# Patient Record
Sex: FEMALE | Race: WHITE | NOT HISPANIC OR LATINO
[De-identification: names, ages, dates, MRNs, and addresses within clinical notes are randomized per-mention and may not be internally consistent; named-entity substitution may affect disease eponyms.]

---

## 2017-01-04 ENCOUNTER — RX RENEWAL (OUTPATIENT)
Age: 62
End: 2017-01-04

## 2017-01-17 ENCOUNTER — OTHER (OUTPATIENT)
Age: 62
End: 2017-01-17

## 2017-01-17 ENCOUNTER — APPOINTMENT (OUTPATIENT)
Dept: GASTROENTEROLOGY | Facility: CLINIC | Age: 62
End: 2017-01-17

## 2017-01-31 ENCOUNTER — APPOINTMENT (OUTPATIENT)
Dept: GASTROENTEROLOGY | Facility: CLINIC | Age: 62
End: 2017-01-31

## 2017-02-08 ENCOUNTER — APPOINTMENT (OUTPATIENT)
Dept: GASTROENTEROLOGY | Facility: CLINIC | Age: 62
End: 2017-02-08

## 2017-02-08 VITALS
SYSTOLIC BLOOD PRESSURE: 118 MMHG | TEMPERATURE: 97.7 F | WEIGHT: 163 LBS | OXYGEN SATURATION: 99 % | DIASTOLIC BLOOD PRESSURE: 85 MMHG | BODY MASS INDEX: 30.78 KG/M2 | RESPIRATION RATE: 12 BRPM | HEIGHT: 61 IN | HEART RATE: 87 BPM

## 2017-02-08 DIAGNOSIS — K21.9 GASTRO-ESOPHAGEAL REFLUX DISEASE W/OUT ESOPHAGITIS: ICD-10-CM

## 2017-02-08 DIAGNOSIS — B35.9 DERMATOPHYTOSIS, UNSPECIFIED: ICD-10-CM

## 2017-02-08 DIAGNOSIS — K72.90 HEPATIC FAILURE, UNSPECIFIED W/OUT COMA: ICD-10-CM

## 2017-02-08 RX ORDER — CARVEDILOL 3.12 MG/1
3.12 TABLET, FILM COATED ORAL TWICE DAILY
Refills: 0 | Status: ACTIVE | COMMUNITY
Start: 2017-02-08

## 2017-02-08 RX ORDER — SENNOSIDES 8.6 MG/1
8.6 CAPSULE, GELATIN COATED ORAL
Refills: 0 | Status: ACTIVE | COMMUNITY
Start: 2017-02-08

## 2017-02-08 RX ORDER — DOCUSATE SODIUM 100 MG/1
100 CAPSULE ORAL 3 TIMES DAILY
Qty: 90 | Refills: 3 | Status: ACTIVE | COMMUNITY
Start: 2017-02-08

## 2017-02-08 RX ORDER — DULOXETINE HYDROCHLORIDE 30 MG/1
30 CAPSULE, DELAYED RELEASE PELLETS ORAL TWICE DAILY
Qty: 60 | Refills: 0 | Status: ACTIVE | COMMUNITY
Start: 2017-02-08

## 2017-02-08 RX ORDER — CLOPIDOGREL 75 MG/1
75 TABLET, FILM COATED ORAL DAILY
Refills: 0 | Status: ACTIVE | COMMUNITY
Start: 2017-02-08

## 2017-02-08 RX ORDER — LISINOPRIL 20 MG/1
20 TABLET ORAL DAILY
Qty: 30 | Refills: 2 | Status: ACTIVE | COMMUNITY
Start: 2017-02-08

## 2017-02-08 RX ORDER — ESOMEPRAZOLE MAGNESIUM 40 MG/1
40 CAPSULE, DELAYED RELEASE ORAL
Qty: 90 | Refills: 1 | Status: ACTIVE | COMMUNITY
Start: 2017-02-08 | End: 1900-01-01

## 2017-02-08 RX ORDER — PANTOPRAZOLE 40 MG/1
40 TABLET, DELAYED RELEASE ORAL DAILY
Qty: 30 | Refills: 0 | Status: DISCONTINUED | COMMUNITY
Start: 2017-02-08 | End: 2017-02-08

## 2017-02-08 RX ORDER — CLOTRIMAZOLE AND BETAMETHASONE DIPROPIONATE 10; .5 MG/G; MG/G
1-0.05 CREAM TOPICAL TWICE DAILY
Qty: 1 | Refills: 0 | Status: ACTIVE | COMMUNITY
Start: 2017-02-08 | End: 1900-01-01

## 2017-02-14 LAB
25(OH)D3 SERPL-MCNC: 34.6 NG/ML
AFPL3 RESULTS RECEIVED: NORMAL
ALBUMIN SERPL ELPH-MCNC: 4.3 G/DL
ALP BLD-CCNC: 141 U/L
ALPHA-1-FETOPROTEIN-L3: NORMAL %
ALPHA-1-FETOPROTEIN: 3 NG/ML
ALT SERPL-CCNC: 28 U/L
ANION GAP SERPL CALC-SCNC: 13 MMOL/L
AST SERPL-CCNC: 29 U/L
BASOPHILS # BLD AUTO: 0.04 K/UL
BASOPHILS NFR BLD AUTO: 0.8 %
BILIRUB SERPL-MCNC: 0.3 MG/DL
BUN SERPL-MCNC: 18 MG/DL
CALCIUM SERPL-MCNC: 9.9 MG/DL
CHLORIDE SERPL-SCNC: 101 MMOL/L
CO2 SERPL-SCNC: 23 MMOL/L
CREAT SERPL-MCNC: 0.81 MG/DL
CRP SERPL-MCNC: 0.2 MG/DL
EOSINOPHIL # BLD AUTO: 0.14 K/UL
EOSINOPHIL NFR BLD AUTO: 2.9 %
GLUCOSE SERPL-MCNC: 163 MG/DL
HCT VFR BLD CALC: 38.2 %
HGB BLD-MCNC: 12.2 G/DL
IMM GRANULOCYTES NFR BLD AUTO: 0.4 %
INR PPP: 1.14 RATIO
LYMPHOCYTES # BLD AUTO: 1.17 K/UL
LYMPHOCYTES NFR BLD AUTO: 24.1 %
MAN DIFF?: NORMAL
MCHC RBC-ENTMCNC: 28.4 PG
MCHC RBC-ENTMCNC: 31.9 GM/DL
MCV RBC AUTO: 89 FL
MONOCYTES # BLD AUTO: 0.38 K/UL
MONOCYTES NFR BLD AUTO: 7.8 %
NEUTROPHILS # BLD AUTO: 3.1 K/UL
NEUTROPHILS NFR BLD AUTO: 64 %
PLATELET # BLD AUTO: 116 K/UL
POTASSIUM SERPL-SCNC: 4.6 MMOL/L
PROT SERPL-MCNC: 8.3 G/DL
PT BLD: 12.9 SEC
RBC # BLD: 4.29 M/UL
RBC # FLD: 13.5 %
SODIUM SERPL-SCNC: 137 MMOL/L
WBC # FLD AUTO: 4.85 K/UL

## 2017-02-21 ENCOUNTER — OTHER (OUTPATIENT)
Age: 62
End: 2017-02-21

## 2017-02-21 RX ORDER — ESOMEPRAZOLE MAGNESIUM 40 MG/1
40 CAPSULE, DELAYED RELEASE ORAL DAILY
Qty: 90 | Refills: 1 | Status: ACTIVE | COMMUNITY
Start: 2017-02-21 | End: 1900-01-01

## 2017-02-21 RX ORDER — CLOTRIMAZOLE 10 MG/G
1 CREAM TOPICAL TWICE DAILY
Qty: 1 | Refills: 1 | Status: ACTIVE | COMMUNITY
Start: 2017-02-21 | End: 1900-01-01

## 2017-03-06 ENCOUNTER — APPOINTMENT (OUTPATIENT)
Dept: ORTHOPEDIC SURGERY | Facility: CLINIC | Age: 62
End: 2017-03-06

## 2017-03-06 VITALS
WEIGHT: 163 LBS | SYSTOLIC BLOOD PRESSURE: 143 MMHG | BODY MASS INDEX: 30.78 KG/M2 | HEIGHT: 61 IN | HEART RATE: 106 BPM | DIASTOLIC BLOOD PRESSURE: 88 MMHG

## 2017-03-06 DIAGNOSIS — Z86.39 PERSONAL HISTORY OF OTHER ENDOCRINE, NUTRITIONAL AND METABOLIC DISEASE: ICD-10-CM

## 2017-03-06 DIAGNOSIS — G56.21 LESION OF ULNAR NERVE, RIGHT UPPER LIMB: ICD-10-CM

## 2017-03-15 ENCOUNTER — OTHER (OUTPATIENT)
Age: 62
End: 2017-03-15

## 2017-03-15 RX ORDER — SPIRONOLACTONE 100 MG/1
100 TABLET ORAL
Qty: 30 | Refills: 1 | Status: ACTIVE | COMMUNITY
Start: 2017-03-15 | End: 1900-01-01

## 2017-03-21 ENCOUNTER — OUTPATIENT (OUTPATIENT)
Dept: OUTPATIENT SERVICES | Facility: HOSPITAL | Age: 62
LOS: 1 days | End: 2017-03-21
Payer: MEDICARE

## 2017-03-21 VITALS
TEMPERATURE: 98 F | SYSTOLIC BLOOD PRESSURE: 107 MMHG | RESPIRATION RATE: 18 BRPM | HEIGHT: 61 IN | WEIGHT: 175.93 LBS | HEART RATE: 102 BPM | DIASTOLIC BLOOD PRESSURE: 71 MMHG | OXYGEN SATURATION: 96 %

## 2017-03-21 DIAGNOSIS — K74.60 UNSPECIFIED CIRRHOSIS OF LIVER: ICD-10-CM

## 2017-03-21 DIAGNOSIS — Z01.818 ENCOUNTER FOR OTHER PREPROCEDURAL EXAMINATION: ICD-10-CM

## 2017-03-21 DIAGNOSIS — E11.9 TYPE 2 DIABETES MELLITUS WITHOUT COMPLICATIONS: ICD-10-CM

## 2017-03-21 DIAGNOSIS — I10 ESSENTIAL (PRIMARY) HYPERTENSION: ICD-10-CM

## 2017-03-21 LAB
ANION GAP SERPL CALC-SCNC: 18 MMOL/L — HIGH (ref 5–17)
BUN SERPL-MCNC: 26 MG/DL — HIGH (ref 7–23)
CALCIUM SERPL-MCNC: 10.5 MG/DL — SIGNIFICANT CHANGE UP (ref 8.4–10.5)
CHLORIDE SERPL-SCNC: 93 MMOL/L — LOW (ref 96–108)
CO2 SERPL-SCNC: 24 MMOL/L — SIGNIFICANT CHANGE UP (ref 22–31)
CREAT SERPL-MCNC: 1.26 MG/DL — SIGNIFICANT CHANGE UP (ref 0.5–1.3)
GLUCOSE SERPL-MCNC: 224 MG/DL — HIGH (ref 70–99)
HBA1C BLD-MCNC: 7.9 % — HIGH (ref 4–5.6)
HCT VFR BLD CALC: 38.1 % — SIGNIFICANT CHANGE UP (ref 34.5–45)
HGB BLD-MCNC: 12.5 G/DL — SIGNIFICANT CHANGE UP (ref 11.5–15.5)
MCHC RBC-ENTMCNC: 28.2 PG — SIGNIFICANT CHANGE UP (ref 27–34)
MCHC RBC-ENTMCNC: 32.8 GM/DL — SIGNIFICANT CHANGE UP (ref 32–36)
MCV RBC AUTO: 85.8 FL — SIGNIFICANT CHANGE UP (ref 80–100)
PLATELET # BLD AUTO: 148 K/UL — LOW (ref 150–400)
POTASSIUM SERPL-MCNC: 5.5 MMOL/L — HIGH (ref 3.5–5.3)
POTASSIUM SERPL-SCNC: 5.5 MMOL/L — HIGH (ref 3.5–5.3)
RBC # BLD: 4.44 M/UL — SIGNIFICANT CHANGE UP (ref 3.8–5.2)
RBC # FLD: 14.8 % — HIGH (ref 10.3–14.5)
SODIUM SERPL-SCNC: 134 MMOL/L — LOW (ref 135–145)
WBC # BLD: 6.44 K/UL — SIGNIFICANT CHANGE UP (ref 3.8–10.5)
WBC # FLD AUTO: 6.44 K/UL — SIGNIFICANT CHANGE UP (ref 3.8–10.5)

## 2017-03-21 PROCEDURE — 83036 HEMOGLOBIN GLYCOSYLATED A1C: CPT

## 2017-03-21 PROCEDURE — 80048 BASIC METABOLIC PNL TOTAL CA: CPT

## 2017-03-21 PROCEDURE — 85027 COMPLETE CBC AUTOMATED: CPT

## 2017-03-21 PROCEDURE — G0463: CPT

## 2017-03-21 NOTE — H&P PST ADULT - HISTORY OF PRESENT ILLNESS
58 year old female with h/o chronic constipation- c/o blood in the stool x one month- GI consult- for colonoscopy.      **** awaiting laminectomy 61 year old poor historian year old female with PMH of  HTN, COPD ( no recent exacerbations), depression, anxiety, Syncope 10/2016 with [facility] hospitalization x 1 month, liver cirrhosis planned for EGD under anesthesia ( screening for esophageal varices).       *** called to obtain latest Echo and last note from Dr. Baron's office: According to Dr. Gray Baron 's office, Spoke to Eliana patient is not cleared for procedure from cardiac standpoint due to Pending Stress test.  Unable to reach Dr. Gray Betancur. Called Dr. Perez, awaiting call back.  **** According to Dr. Perez note patient on Asprin and Plavix; However, patient denies taking Asprin and Plavix.      ****Patient with severe back pain on Oxycodone : awaiting laminectomy with Dr. Valenzuela in the future. 61 year old poor historian year old female with PMH of  HTN, COPD ( no recent exacerbations), depression, anxiety, Syncope 10/2016 with [facility] hospitalization, liver cirrhosis planned for EGD under anesthesia ( screening for esophageal varices).       *** called to obtain latest Echo and last note from Dr. Baron's office: According to Dr. Gray Baron 's office, Spoke to Eliana patient is not cleared for procedure from cardiac standpoint due to Pending Stress test.  Unable to reach Dr. Gray Betancur. Called Dr. Perez, awaiting call back.  **** According to Dr. Perez note patient on Asprin and Plavix; However, patient denies taking Asprin and Plavix.      ****Patient with severe back pain on Oxycodone : awaiting laminectomy with Dr. Valenzuela in the future. 61 year old poor historian year old female with PMH of  HTN, COPD ( no recent exacerbations), depression, anxiety, Syncope 10/2016 with [facility] hospitalization, liver cirrhosis planned for EGD under anesthesia ( screening for esophageal varices).       *** called to obtain latest Echo and last note from Dr. Baron's office: According to Dr. Gray Baron 's office, Spoke to Eliana patient is not cleared for procedure from cardiac standpoint due to Pending Stress test.  Unable to reach Dr. Gray Betancur. Called Dr. Perez, awaiting call back.  **** According to Dr. Perez note patient on Asprin and Plavix; However, patient denies taking Asprin and Plavix.  ****Patient with severe back pain on Oxycodone : awaiting laminectomy with Dr. Valenzuela in the future.   ******3/21/17 6pm received call back from Dr. Perez, Patient on ASA and Plavix for possible CVA 10/2016, EGD scheduled for tomorrow, Called Patient, Now she is saying she does take aspirin and Plavix for possible mini stroke.

## 2017-03-21 NOTE — H&P PST ADULT - PROBLEM SELECTOR PLAN 2
continue cardiac meds  recent echo, cardio note on file continue cardiac meds  recent ekg, echo, cardio note on file  will obtain cath report from [facility] 10/2016: called

## 2017-03-21 NOTE — H&P PST ADULT - PMH
Anemia    Anxiety    Asthma    Chronic sinusitis    Cirrhosis    Constipation, chronic    COPD (chronic obstructive pulmonary disease)  no recent exacerb  Diabetes mellitus  type 2  Fall at home    Fatty liver    GERD (gastroesophageal reflux disease)    Hyperlipidemia    Hypertension    Lung nodules Anemia    Anxiety    Anxiety and depression    Asthma    Chronic sinusitis    Cirrhosis    Constipation, chronic    COPD (chronic obstructive pulmonary disease)  no recent exacerb  Diabetes mellitus  type 2  Fall at home    Fatty liver    GERD (gastroesophageal reflux disease)    Hyperlipidemia    Hypertension    Lung nodules    Syncope and collapse  10/2016 with long hospital stay at [facility], with negative cardiac work up as per patient, s/p angiogram too with no blockages  as per patient. + right arm nerve damages.  Thrombocytopenia Anemia    Anxiety    Anxiety and depression    Asthma    Chronic sinusitis    Cirrhosis    Constipation, chronic    COPD (chronic obstructive pulmonary disease)  no recent exacerb  Diabetes mellitus  type 2  Fall at home    Fatty liver    GERD (gastroesophageal reflux disease)    Hyperlipidemia    Hypertension    Lung nodules    Syncope and collapse  10/2016 with long hospital stay at [facility], with negative cardiac work up as per patient, s/p angiogram too with no blockages  as per patient. + right arm nerve damages.  Thrombocytopenia    TIA (transient ischemic attack)  10/2016 on ASA and Plavix as per Dr. Perez

## 2017-03-21 NOTE — H&P PST ADULT - NSANTHOSAYNRD_GEN_A_CORE
No. IRENE screening performed.  STOP BANG Legend: 0-2 = LOW Risk; 3-4 = INTERMEDIATE Risk; 5-8 = HIGH Risk

## 2017-03-21 NOTE — H&P PST ADULT - LAST CARDIAC ANGIOGRAM/IMAGING
2012 ? 10/2016 at [facility] normal per pt 10/2016 at [facility] normal per pt 10/2016 at [facility]: non Obstructive CAD : report on file

## 2017-03-22 ENCOUNTER — INPATIENT (INPATIENT)
Facility: HOSPITAL | Age: 62
LOS: 1 days | Discharge: HOME | DRG: 392 | End: 2017-03-24
Attending: HOSPITALIST | Admitting: HOSPITALIST
Payer: MEDICARE

## 2017-03-22 ENCOUNTER — OTHER (OUTPATIENT)
Age: 62
End: 2017-03-22

## 2017-03-22 VITALS
TEMPERATURE: 98 F | OXYGEN SATURATION: 94 % | RESPIRATION RATE: 18 BRPM | SYSTOLIC BLOOD PRESSURE: 100 MMHG | HEART RATE: 96 BPM | DIASTOLIC BLOOD PRESSURE: 68 MMHG

## 2017-03-22 DIAGNOSIS — N17.9 ACUTE KIDNEY FAILURE, UNSPECIFIED: ICD-10-CM

## 2017-03-22 DIAGNOSIS — K75.81 NONALCOHOLIC STEATOHEPATITIS (NASH): ICD-10-CM

## 2017-03-22 DIAGNOSIS — E03.9 HYPOTHYROIDISM, UNSPECIFIED: ICD-10-CM

## 2017-03-22 DIAGNOSIS — I25.10 ATHEROSCLEROTIC HEART DISEASE OF NATIVE CORONARY ARTERY WITHOUT ANGINA PECTORIS: ICD-10-CM

## 2017-03-22 DIAGNOSIS — R06.02 SHORTNESS OF BREATH: ICD-10-CM

## 2017-03-22 DIAGNOSIS — K59.03 DRUG INDUCED CONSTIPATION: ICD-10-CM

## 2017-03-22 DIAGNOSIS — E11.9 TYPE 2 DIABETES MELLITUS WITHOUT COMPLICATIONS: ICD-10-CM

## 2017-03-22 DIAGNOSIS — R63.8 OTHER SYMPTOMS AND SIGNS CONCERNING FOOD AND FLUID INTAKE: ICD-10-CM

## 2017-03-22 DIAGNOSIS — Z41.8 ENCOUNTER FOR OTHER PROCEDURES FOR PURPOSES OTHER THAN REMEDYING HEALTH STATE: ICD-10-CM

## 2017-03-22 DIAGNOSIS — J44.9 CHRONIC OBSTRUCTIVE PULMONARY DISEASE, UNSPECIFIED: ICD-10-CM

## 2017-03-22 PROCEDURE — 99223 1ST HOSP IP/OBS HIGH 75: CPT | Mod: GC

## 2017-03-22 PROCEDURE — 93010 ELECTROCARDIOGRAM REPORT: CPT

## 2017-03-22 PROCEDURE — 71020: CPT | Mod: 26

## 2017-03-22 PROCEDURE — 74177 CT ABD & PELVIS W/CONTRAST: CPT | Mod: 26

## 2017-03-22 PROCEDURE — 99285 EMERGENCY DEPT VISIT HI MDM: CPT | Mod: 25

## 2017-03-22 RX ORDER — MECLIZINE HCL 12.5 MG
25 TABLET ORAL ONCE
Qty: 0 | Refills: 0 | Status: COMPLETED | OUTPATIENT
Start: 2017-03-22 | End: 2017-03-22

## 2017-03-22 RX ORDER — OXYCODONE HYDROCHLORIDE 5 MG/1
15 TABLET ORAL EVERY 6 HOURS
Qty: 0 | Refills: 0 | Status: DISCONTINUED | OUTPATIENT
Start: 2017-03-22 | End: 2017-03-22

## 2017-03-22 RX ORDER — ONDANSETRON 8 MG/1
4 TABLET, FILM COATED ORAL ONCE
Qty: 0 | Refills: 0 | Status: COMPLETED | OUTPATIENT
Start: 2017-03-22 | End: 2017-03-22

## 2017-03-22 RX ORDER — DEXTROSE 50 % IN WATER 50 %
1 SYRINGE (ML) INTRAVENOUS ONCE
Qty: 0 | Refills: 0 | Status: DISCONTINUED | OUTPATIENT
Start: 2017-03-22 | End: 2017-03-24

## 2017-03-22 RX ORDER — INSULIN LISPRO 100/ML
VIAL (ML) SUBCUTANEOUS AT BEDTIME
Qty: 0 | Refills: 0 | Status: DISCONTINUED | OUTPATIENT
Start: 2017-03-22 | End: 2017-03-24

## 2017-03-22 RX ORDER — IPRATROPIUM/ALBUTEROL SULFATE 18-103MCG
3 AEROSOL WITH ADAPTER (GRAM) INHALATION ONCE
Qty: 0 | Refills: 0 | Status: COMPLETED | OUTPATIENT
Start: 2017-03-22 | End: 2017-03-22

## 2017-03-22 RX ORDER — DOCUSATE SODIUM 100 MG
100 CAPSULE ORAL THREE TIMES A DAY
Qty: 0 | Refills: 0 | Status: DISCONTINUED | OUTPATIENT
Start: 2017-03-22 | End: 2017-03-24

## 2017-03-22 RX ORDER — CYCLOBENZAPRINE HYDROCHLORIDE 10 MG/1
10 TABLET, FILM COATED ORAL THREE TIMES A DAY
Qty: 0 | Refills: 0 | Status: DISCONTINUED | OUTPATIENT
Start: 2017-03-22 | End: 2017-03-24

## 2017-03-22 RX ORDER — TRAZODONE HCL 50 MG
300 TABLET ORAL AT BEDTIME
Qty: 0 | Refills: 0 | Status: DISCONTINUED | OUTPATIENT
Start: 2017-03-22 | End: 2017-03-24

## 2017-03-22 RX ORDER — LISINOPRIL 2.5 MG/1
20 TABLET ORAL DAILY
Qty: 0 | Refills: 0 | Status: DISCONTINUED | OUTPATIENT
Start: 2017-03-22 | End: 2017-03-24

## 2017-03-22 RX ORDER — DEXTROSE 50 % IN WATER 50 %
25 SYRINGE (ML) INTRAVENOUS ONCE
Qty: 0 | Refills: 0 | Status: DISCONTINUED | OUTPATIENT
Start: 2017-03-22 | End: 2017-03-24

## 2017-03-22 RX ORDER — METHYLNALTREXONE BROMIDE 12 MG/.6ML
12 INJECTION, SOLUTION SUBCUTANEOUS ONCE
Qty: 0 | Refills: 0 | Status: DISCONTINUED | OUTPATIENT
Start: 2017-03-22 | End: 2017-03-22

## 2017-03-22 RX ORDER — SODIUM CHLORIDE 9 MG/ML
3 INJECTION INTRAMUSCULAR; INTRAVENOUS; SUBCUTANEOUS ONCE
Qty: 0 | Refills: 0 | Status: COMPLETED | OUTPATIENT
Start: 2017-03-22 | End: 2017-03-22

## 2017-03-22 RX ORDER — GLUCAGON INJECTION, SOLUTION 0.5 MG/.1ML
1 INJECTION, SOLUTION SUBCUTANEOUS ONCE
Qty: 0 | Refills: 0 | Status: DISCONTINUED | OUTPATIENT
Start: 2017-03-22 | End: 2017-03-24

## 2017-03-22 RX ORDER — HEPARIN SODIUM 5000 [USP'U]/ML
5000 INJECTION INTRAVENOUS; SUBCUTANEOUS EVERY 8 HOURS
Qty: 0 | Refills: 0 | Status: DISCONTINUED | OUTPATIENT
Start: 2017-03-22 | End: 2017-03-24

## 2017-03-22 RX ORDER — GABAPENTIN 400 MG/1
400 CAPSULE ORAL ONCE
Qty: 0 | Refills: 0 | Status: COMPLETED | OUTPATIENT
Start: 2017-03-22 | End: 2017-03-22

## 2017-03-22 RX ORDER — CLONAZEPAM 1 MG
0.5 TABLET ORAL AT BEDTIME
Qty: 0 | Refills: 0 | Status: DISCONTINUED | OUTPATIENT
Start: 2017-03-22 | End: 2017-03-24

## 2017-03-22 RX ORDER — OXYCODONE HYDROCHLORIDE 5 MG/1
15 TABLET ORAL EVERY 6 HOURS
Qty: 0 | Refills: 0 | Status: DISCONTINUED | OUTPATIENT
Start: 2017-03-22 | End: 2017-03-24

## 2017-03-22 RX ORDER — INSULIN LISPRO 100/ML
VIAL (ML) SUBCUTANEOUS
Qty: 0 | Refills: 0 | Status: DISCONTINUED | OUTPATIENT
Start: 2017-03-22 | End: 2017-03-24

## 2017-03-22 RX ORDER — MORPHINE SULFATE 50 MG/1
6 CAPSULE, EXTENDED RELEASE ORAL ONCE
Qty: 0 | Refills: 0 | Status: DISCONTINUED | OUTPATIENT
Start: 2017-03-22 | End: 2017-03-22

## 2017-03-22 RX ORDER — DEXTROSE 50 % IN WATER 50 %
12.5 SYRINGE (ML) INTRAVENOUS ONCE
Qty: 0 | Refills: 0 | Status: DISCONTINUED | OUTPATIENT
Start: 2017-03-22 | End: 2017-03-24

## 2017-03-22 RX ORDER — LEVOTHYROXINE SODIUM 125 MCG
25 TABLET ORAL DAILY
Qty: 0 | Refills: 0 | Status: DISCONTINUED | OUTPATIENT
Start: 2017-03-22 | End: 2017-03-24

## 2017-03-22 RX ORDER — SENNA PLUS 8.6 MG/1
2 TABLET ORAL AT BEDTIME
Qty: 0 | Refills: 0 | Status: DISCONTINUED | OUTPATIENT
Start: 2017-03-22 | End: 2017-03-23

## 2017-03-22 RX ORDER — POLYETHYLENE GLYCOL 3350 17 G/17G
17 POWDER, FOR SOLUTION ORAL
Qty: 0 | Refills: 0 | Status: DISCONTINUED | OUTPATIENT
Start: 2017-03-22 | End: 2017-03-24

## 2017-03-22 RX ORDER — SUCRALFATE 1 G
1 TABLET ORAL
Qty: 0 | Refills: 0 | Status: DISCONTINUED | OUTPATIENT
Start: 2017-03-22 | End: 2017-03-24

## 2017-03-22 RX ORDER — SODIUM CHLORIDE 9 MG/ML
1000 INJECTION, SOLUTION INTRAVENOUS
Qty: 0 | Refills: 0 | Status: DISCONTINUED | OUTPATIENT
Start: 2017-03-22 | End: 2017-03-24

## 2017-03-22 RX ORDER — SODIUM CHLORIDE 9 MG/ML
500 INJECTION INTRAMUSCULAR; INTRAVENOUS; SUBCUTANEOUS ONCE
Qty: 0 | Refills: 0 | Status: COMPLETED | OUTPATIENT
Start: 2017-03-22 | End: 2017-03-22

## 2017-03-22 RX ORDER — CARVEDILOL PHOSPHATE 80 MG/1
3.12 CAPSULE, EXTENDED RELEASE ORAL EVERY 12 HOURS
Qty: 0 | Refills: 0 | Status: DISCONTINUED | OUTPATIENT
Start: 2017-03-22 | End: 2017-03-24

## 2017-03-22 RX ORDER — DULOXETINE HYDROCHLORIDE 30 MG/1
60 CAPSULE, DELAYED RELEASE ORAL DAILY
Qty: 0 | Refills: 0 | Status: DISCONTINUED | OUTPATIENT
Start: 2017-03-22 | End: 2017-03-24

## 2017-03-22 RX ORDER — GABAPENTIN 400 MG/1
300 CAPSULE ORAL THREE TIMES A DAY
Qty: 0 | Refills: 0 | Status: DISCONTINUED | OUTPATIENT
Start: 2017-03-22 | End: 2017-03-24

## 2017-03-22 RX ADMIN — HEPARIN SODIUM 5000 UNIT(S): 5000 INJECTION INTRAVENOUS; SUBCUTANEOUS at 23:43

## 2017-03-22 RX ADMIN — Medication 1 GRAM(S): at 23:52

## 2017-03-22 RX ADMIN — MORPHINE SULFATE 6 MILLIGRAM(S): 50 CAPSULE, EXTENDED RELEASE ORAL at 20:36

## 2017-03-22 RX ADMIN — GABAPENTIN 300 MILLIGRAM(S): 400 CAPSULE ORAL at 23:52

## 2017-03-22 RX ADMIN — SODIUM CHLORIDE 500 MILLILITER(S): 9 INJECTION INTRAMUSCULAR; INTRAVENOUS; SUBCUTANEOUS at 17:31

## 2017-03-22 RX ADMIN — Medication 3 MILLILITER(S): at 17:31

## 2017-03-22 RX ADMIN — ONDANSETRON 4 MILLIGRAM(S): 8 TABLET, FILM COATED ORAL at 17:30

## 2017-03-22 RX ADMIN — Medication 25 MILLIGRAM(S): at 17:30

## 2017-03-22 RX ADMIN — Medication 300 MILLIGRAM(S): at 23:42

## 2017-03-22 RX ADMIN — Medication 100 MILLIGRAM(S): at 23:43

## 2017-03-22 RX ADMIN — SODIUM CHLORIDE 3 MILLILITER(S): 9 INJECTION INTRAMUSCULAR; INTRAVENOUS; SUBCUTANEOUS at 17:31

## 2017-03-22 RX ADMIN — Medication 0.5 MILLIGRAM(S): at 23:43

## 2017-03-22 RX ADMIN — GABAPENTIN 400 MILLIGRAM(S): 400 CAPSULE ORAL at 17:29

## 2017-03-22 RX ADMIN — SENNA PLUS 2 TABLET(S): 8.6 TABLET ORAL at 23:42

## 2017-03-22 RX ADMIN — Medication: at 23:44

## 2017-03-22 RX ADMIN — Medication 60 MILLIGRAM(S): at 17:31

## 2017-03-22 RX ADMIN — OXYCODONE HYDROCHLORIDE 15 MILLIGRAM(S): 5 TABLET ORAL at 04:45

## 2017-03-22 NOTE — H&P ADULT. - PROBLEM SELECTOR PLAN 6
- Continue Coreg, lisinopril - Continue Coreg, lisinopril  - pt states she had a cath/angiogram + echo in Feb '17, will need to call Dr. eli Baron for outpt records and for cardiac clearance.

## 2017-03-22 NOTE — ED PROVIDER NOTE - ATTENDING CONTRIBUTION TO CARE
Attending MD Carranza: I personally have seen and examined this patient.  Resident note reviewed and agree on plan of care and except where noted.  See below for details.     61F with PMH DM HTN COPD chronic back pain, TIA, liver cirrhosis sent from endoscopy suite because she was unable to be cleared for routine endoscopy.  Patient reports 20-30 lb weight gain in the last month.  Reports did not obtain cardiac clearance, stress, Echo.  Reports chronically short of breath.  Denies any new physical complaints, baseline back pain, and only came in because she was going to have an endoscopy.  Denies chest pain, shortness of breath, palpitations. Denies abdominal pain, nausea, vomiting, diarrhea, blood in stools. Denies dysuria, hematuria, change in urinary habits including frequency, urgency. On exam, head NCAT, PERRL, FROM at neck, no tenderness to palpation or stepoffs along length of spine, +tenderness to palpation paraspinal bilateral lower back, lungs +scatterd end expiratory wheezing with good inspiratory effort, +S1S2, no m/r/g, abdomen soft with +BS, NT, +distended, no CVAT, moving all extremities; Plan: CXR, EKG, nebs, steroid, labs, CT A/P for reported marked increase in weight and girth, admit

## 2017-03-22 NOTE — H&P ADULT. - PROBLEM SELECTOR PLAN 1
- Pt with 20lb weight gain over past 2 months with evidence on CT of large stool burden likely 2/2 chronic opioid use  - Will give 1x dose of methylnaltrexone  - Will give miralax BID  - Will give enema  - Monitor stools count - Pt with 20lb weight gain over past 2 months with evidence on CT of large stool burden likely 2/2 chronic opioid use  - Will give miralax BID/stool softeners  - Will give suppository  - Monitor stools count

## 2017-03-22 NOTE — H&P ADULT. - PROBLEM SELECTOR PLAN 2
- Pt has history of COPD/Asthma with some sputum production today and wheezing  - Will continue Duoneb Q6h PRN  - Monitor respiratory status - Cr 1.12 from 0.8 in February outpt notes  - Likely prerenal  - Urine lytes  - Monitor BMP

## 2017-03-22 NOTE — H&P ADULT. - PROBLEM SELECTOR PLAN 4
- Cr 1.12 from 0.8 in February outpt notes  - Likely prerenal  - Urine lytes  - Monitor BMP - Pt has history of COPD/Asthma with some sputum production today and wheezing  - Will continue Duoneb Q6h PRN  - Monitor respiratory status

## 2017-03-22 NOTE — ED PROVIDER NOTE - OBJECTIVE STATEMENT
61yoF PMH of  HTN, COPD, depression, anxiety, disk herniation (oxycodone) Syncope 10/2016, TIA (non compliant on asa, plavix) liver cirrhosis (2/2 fatty liver - per pt) sent from endoscopy suite prior to routine endoscopy (screen for esophageal varices) after unable to be "cleared by anesthesia" for procedure. Pt reports increased abdominal distention and 20lb weight gani over past 1 month.  Pt also never received scheduled cardiac echo/stress (Dr. Baron's) and has not had cardiac cleared.   In Ed pt well appearing, NAd, no cp/sob/palp, no f/c, no n/v/d, +abd distention, no abd pain, no dysuria, aox3.    GI -  Dr. Perez

## 2017-03-22 NOTE — H&P ADULT. - LAB RESULTS AND INTERPRETATION
hyperkalemia, NIVIA, thrombocytopenia Personally reviewed labs. hyperkalemia, NIVIA, thrombocytopenia

## 2017-03-22 NOTE — H&P ADULT. - ASSESSMENT
61yoF PMH of  HTN, COPD, depression, anxiety, disk herniation, TIAs, liver cirrhosis 2/2 AMARO sent from endoscopy suite for concerns of recent weight gain found to have constipation 61yoF PMH of  HTN, COPD, depression, anxiety, disk herniation, TIAs, liver cirrhosis 2/2 AMARO sent from endoscopy suite for concerns of recent weight gain and SOB, found to have constipation 61yoF PMH of  HTN, COPD, depression, anxiety, disk herniation, TIAs, liver cirrhosis 2/2 AMARO sent from endoscopy suite for concerns of recent weight gain and SOB, found to have NIVIA and constipation

## 2017-03-22 NOTE — H&P ADULT. - PMH
Anemia    Anxiety    Anxiety and depression    Asthma    Chronic sinusitis    Cirrhosis    Constipation, chronic    COPD (chronic obstructive pulmonary disease)  no recent exacerb  Diabetes mellitus  type 2  Fall at home    Fatty liver    GERD (gastroesophageal reflux disease)    Hyperlipidemia    Hypertension    Lung nodules    Syncope and collapse  10/2016 with long hospital stay at [facility], with negative cardiac work up as per patient, s/p angiogram too with no blockages  as per patient. + right arm nerve damages.  Thrombocytopenia    TIA (transient ischemic attack)  10/2016 on ASA and Plavix as per Dr. Perez

## 2017-03-22 NOTE — H&P ADULT. - RADIOLOGY RESULTS AND INTERPRETATION
CT abdomen/pelvis no bowel obstruction, constipation, distended gallbladder, cirrhosis with portal hypertension and splenomegaly. Personally reviewed imaging. CT abdomen/pelvis no bowel obstruction, constipation, distended gallbladder, cirrhosis with portal hypertension and splenomegaly. No ascites

## 2017-03-22 NOTE — ED PROVIDER NOTE - PMH
Anemia    Anxiety    Anxiety and depression    Asthma    Chronic sinusitis    Cirrhosis    Constipation, chronic    COPD (chronic obstructive pulmonary disease)  no recent exacerb  Diabetes mellitus  type 2  Fall at home    Fatty liver    GERD (gastroesophageal reflux disease)    Hyperlipidemia    Hypertension    Lung nodules    Syncope and collapse  10/2016 with long hospital stay at [facility], with negative cardiac work up as per patient, s/p angiogram too with no blockages  as per patient. + right arm nerve damages.  Thrombocytopenia    TIA (transient ischemic attack)  10/2016 on ASA and Plavix as per Dr. Perez

## 2017-03-22 NOTE — H&P ADULT. - PROBLEM SELECTOR PLAN 3
- Pt presents after being scheduled for screening endoscopy which was canceled  - Will continue Lasix, hold spironolactone given elevated potassium  - No evidence of worsening ascites on CT a/p  - Per report GI would like TTE to evaluate heart before next Endoscopy  - Monitor for now, per pt will get endoscopy next week - Pt presents after being scheduled for screening endoscopy which was canceled  - Will continue Lasix, hold spironolactone given elevated potassium  - No evidence of worsening ascites on CT a/p  - Per report GI would like TTE to evaluate heart before next Endoscopy  - Monitor for now, per pt will get endoscopy next week  - pt states she had a cath/angiogram in Feb '17, will need to call Dr. eli Baron for outpt records and for cardiac clearance.

## 2017-03-22 NOTE — ED ADULT NURSE NOTE - OBJECTIVE STATEMENT
1471 61 yr old WF brought to ER via stretcher for further eval and tx of abd distention, 28 pound weight gain, and SOB x 1 month. Was scheduled to have endoscopy today. states not done due to c/o SOB. A&Ox3. color pink. skin W&D. Bibasilar crackles. denies chest pain, palp, dizziness, fever or chills. abd distended

## 2017-03-22 NOTE — H&P ADULT. - EKG AND INTERPRETATION
NSR 92, no ST changes, No peaked T waves Personally reviewed EKG. NSR 92, no ST changes, No peaked T waves

## 2017-03-22 NOTE — H&P ADULT. - HISTORY OF PRESENT ILLNESS
61yoF PMH of  HTN, COPD, depression, anxiety, disk herniation, TIAs, liver cirrhosis 2/2 AMARO sent from endoscopy suite for concerns of recent weight gain. Per the patient she has gained about 20lbs in the past month. She says that she has become very distended and that her clothes don't fit well anymore. She says that today she went to get a screening endoscopy for varices and was told by anesthesiology that she could not be cleared for anesthesia. Per the patient she was told that they got results from her cardiologist which showed heart disease and was concerned that she would do poorly during the procedure. Pt states that she has a history of disc herniation and is on chronic opioids. Pt states that she does have daily BMs but feels like they are not sufficient. Pt states that she is on Senna/colace daily. Pt states that she experienced some SOB today with wheezing. She says she took her albuterol inhaler with some improvement. Pt denies any worsening cough but did notice some yellow mucus this AM. Pt denies any further SOB at this time.     In the ED VS were stable. Labs showed some thrombocytopenia, hyperkalemia, 61yoF PMH of  HTN, COPD, depression, hypothyroid, anxiety, disk herniation, TIAs, liver cirrhosis 2/2 AMARO sent from endoscopy suite for concerns of recent weight gain. Per the patient she has gained about 20lbs in the past month. She says that she has become very distended and that her clothes don't fit well anymore. She says that today she went to get a screening endoscopy for varices and was told by anesthesiology that she could not be cleared for anesthesia. Per the patient she was told that they got results from her cardiologist which showed heart disease and was concerned that she would do poorly during the procedure. Pt states that she has a history of disc herniation and is on chronic opioids. Pt states that she does have daily BMs but feels like they are not sufficient. Pt states that she is on Senna/colace daily. Pt states that she experienced some SOB today with wheezing. She says she took her albuterol inhaler with some improvement. Pt denies any worsening cough but did notice some yellow mucus this AM. Pt denies any further SOB at this time.     In the ED VS were stable. Labs showed some thrombocytopenia, hyperkalemia. CT abdomen/pelvis no bowel obstruction, constipation, distended gallbladder, cirrhosis with portal hypertension and splenomegaly. Pt admitted to medicine for further management. 61yoF PMH of  HTN, COPD, depression, hypothyroid, anxiety, disk herniation, TIAs, liver cirrhosis 2/2 AMARO sent from endoscopy suite for concerns of recent weight gain. Per the patient she has gained about 20lbs in the past month. She says that she has become very distended and that her clothes don't fit well anymore. She says that today she went to get a screening endoscopy for varices and was told by anesthesiology that she could not be cleared for anesthesia. Per the patient she was told that they got results from her cardiologist which showed heart disease and was concerned that she would do poorly during the procedure. Pt states that she has a history of disc herniation and is on chronic opioids. Pt states that she does have daily BMs but feels like they are not sufficient. Pt states that she is on Senna/colace daily. Pt states that she experienced some SOB today with wheezing. She says she took her albuterol inhaler with some improvement. Pt denies any worsening cough but did notice some yellow mucus this AM. Pt denies any further SOB at this time. Reports she had cath and echo as outpt in Feb '17 with Dr. Baron. Last EGD in '14 showing moderate esophageal portal gastropathy.    In the ED VS were stable. Labs showed some thrombocytopenia, hyperkalemia. CT abdomen/pelvis no bowel obstruction, constipation, distended gallbladder, cirrhosis with portal hypertension and splenomegaly. Pt admitted to medicine for further management.

## 2017-03-23 ENCOUNTER — APPOINTMENT (OUTPATIENT)
Dept: NEUROLOGY | Facility: CLINIC | Age: 62
End: 2017-03-23

## 2017-03-23 PROCEDURE — 99233 SBSQ HOSP IP/OBS HIGH 50: CPT | Mod: GC

## 2017-03-23 PROCEDURE — 99223 1ST HOSP IP/OBS HIGH 75: CPT | Mod: GC

## 2017-03-23 RX ORDER — CLONAZEPAM 1 MG
1 TABLET ORAL DAILY
Qty: 0 | Refills: 0 | Status: DISCONTINUED | OUTPATIENT
Start: 2017-03-23 | End: 2017-03-24

## 2017-03-23 RX ORDER — IPRATROPIUM/ALBUTEROL SULFATE 18-103MCG
3 AEROSOL WITH ADAPTER (GRAM) INHALATION EVERY 6 HOURS
Qty: 0 | Refills: 0 | Status: DISCONTINUED | OUTPATIENT
Start: 2017-03-23 | End: 2017-03-24

## 2017-03-23 RX ORDER — CLONAZEPAM 1 MG
2 TABLET ORAL ONCE
Qty: 0 | Refills: 0 | Status: DISCONTINUED | OUTPATIENT
Start: 2017-03-23 | End: 2017-03-23

## 2017-03-23 RX ORDER — MORPHINE SULFATE 50 MG/1
2 CAPSULE, EXTENDED RELEASE ORAL ONCE
Qty: 0 | Refills: 0 | Status: DISCONTINUED | OUTPATIENT
Start: 2017-03-23 | End: 2017-03-23

## 2017-03-23 RX ORDER — MIRTAZAPINE 45 MG/1
45 TABLET, ORALLY DISINTEGRATING ORAL AT BEDTIME
Qty: 0 | Refills: 0 | Status: DISCONTINUED | OUTPATIENT
Start: 2017-03-23 | End: 2017-03-24

## 2017-03-23 RX ORDER — LACTULOSE 10 G/15ML
20 SOLUTION ORAL ONCE
Qty: 0 | Refills: 0 | Status: COMPLETED | OUTPATIENT
Start: 2017-03-23 | End: 2017-03-23

## 2017-03-23 RX ORDER — LACTULOSE 10 G/15ML
20 SOLUTION ORAL ONCE
Qty: 0 | Refills: 0 | Status: COMPLETED | OUTPATIENT
Start: 2017-03-24 | End: 2017-03-24

## 2017-03-23 RX ORDER — SENNA PLUS 8.6 MG/1
2 TABLET ORAL AT BEDTIME
Qty: 0 | Refills: 0 | Status: DISCONTINUED | OUTPATIENT
Start: 2017-03-23 | End: 2017-03-24

## 2017-03-23 RX ORDER — LACTULOSE 10 G/15ML
20 SOLUTION ORAL ONCE
Qty: 0 | Refills: 0 | Status: DISCONTINUED | OUTPATIENT
Start: 2017-03-23 | End: 2017-03-23

## 2017-03-23 RX ADMIN — OXYCODONE HYDROCHLORIDE 15 MILLIGRAM(S): 5 TABLET ORAL at 04:30

## 2017-03-23 RX ADMIN — POLYETHYLENE GLYCOL 3350 17 GRAM(S): 17 POWDER, FOR SOLUTION ORAL at 08:40

## 2017-03-23 RX ADMIN — MIRTAZAPINE 45 MILLIGRAM(S): 45 TABLET, ORALLY DISINTEGRATING ORAL at 23:01

## 2017-03-23 RX ADMIN — CARVEDILOL PHOSPHATE 3.12 MILLIGRAM(S): 80 CAPSULE, EXTENDED RELEASE ORAL at 04:39

## 2017-03-23 RX ADMIN — DULOXETINE HYDROCHLORIDE 60 MILLIGRAM(S): 30 CAPSULE, DELAYED RELEASE ORAL at 12:44

## 2017-03-23 RX ADMIN — MORPHINE SULFATE 2 MILLIGRAM(S): 50 CAPSULE, EXTENDED RELEASE ORAL at 00:50

## 2017-03-23 RX ADMIN — LACTULOSE 20 GRAM(S): 10 SOLUTION ORAL at 18:03

## 2017-03-23 RX ADMIN — Medication 1: at 18:03

## 2017-03-23 RX ADMIN — MORPHINE SULFATE 2 MILLIGRAM(S): 50 CAPSULE, EXTENDED RELEASE ORAL at 00:34

## 2017-03-23 RX ADMIN — Medication 100 MILLIGRAM(S): at 04:39

## 2017-03-23 RX ADMIN — GABAPENTIN 300 MILLIGRAM(S): 400 CAPSULE ORAL at 12:44

## 2017-03-23 RX ADMIN — Medication 0.5 MILLIGRAM(S): at 22:43

## 2017-03-23 RX ADMIN — Medication 3 MILLILITER(S): at 14:44

## 2017-03-23 RX ADMIN — Medication 100 MILLIGRAM(S): at 12:44

## 2017-03-23 RX ADMIN — Medication 25 MICROGRAM(S): at 04:40

## 2017-03-23 RX ADMIN — MIRTAZAPINE 45 MILLIGRAM(S): 45 TABLET, ORALLY DISINTEGRATING ORAL at 04:30

## 2017-03-23 RX ADMIN — Medication 3 MILLILITER(S): at 23:07

## 2017-03-23 RX ADMIN — Medication 5: at 08:35

## 2017-03-23 RX ADMIN — HEPARIN SODIUM 5000 UNIT(S): 5000 INJECTION INTRAVENOUS; SUBCUTANEOUS at 05:15

## 2017-03-23 RX ADMIN — Medication 1 ENEMA: at 22:49

## 2017-03-23 RX ADMIN — CARVEDILOL PHOSPHATE 3.12 MILLIGRAM(S): 80 CAPSULE, EXTENDED RELEASE ORAL at 18:03

## 2017-03-23 RX ADMIN — LACTULOSE 20 GRAM(S): 10 SOLUTION ORAL at 22:30

## 2017-03-23 RX ADMIN — OXYCODONE HYDROCHLORIDE 15 MILLIGRAM(S): 5 TABLET ORAL at 19:21

## 2017-03-23 RX ADMIN — HEPARIN SODIUM 5000 UNIT(S): 5000 INJECTION INTRAVENOUS; SUBCUTANEOUS at 22:35

## 2017-03-23 RX ADMIN — Medication 1 GRAM(S): at 08:35

## 2017-03-23 RX ADMIN — Medication 1 GRAM(S): at 18:03

## 2017-03-23 RX ADMIN — LISINOPRIL 20 MILLIGRAM(S): 2.5 TABLET ORAL at 04:40

## 2017-03-23 RX ADMIN — Medication 100 MILLIGRAM(S): at 22:33

## 2017-03-23 RX ADMIN — Medication 1 GRAM(S): at 12:44

## 2017-03-23 RX ADMIN — GABAPENTIN 300 MILLIGRAM(S): 400 CAPSULE ORAL at 04:39

## 2017-03-23 RX ADMIN — Medication 10 MILLIGRAM(S): at 12:44

## 2017-03-23 RX ADMIN — GABAPENTIN 300 MILLIGRAM(S): 400 CAPSULE ORAL at 22:33

## 2017-03-23 RX ADMIN — Medication 2: at 12:45

## 2017-03-23 RX ADMIN — OXYCODONE HYDROCHLORIDE 15 MILLIGRAM(S): 5 TABLET ORAL at 19:01

## 2017-03-23 RX ADMIN — MORPHINE SULFATE 6 MILLIGRAM(S): 50 CAPSULE, EXTENDED RELEASE ORAL at 22:49

## 2017-03-23 RX ADMIN — Medication 300 MILLIGRAM(S): at 22:31

## 2017-03-23 RX ADMIN — POLYETHYLENE GLYCOL 3350 17 GRAM(S): 17 POWDER, FOR SOLUTION ORAL at 18:04

## 2017-03-23 RX ADMIN — Medication 1 MILLIGRAM(S): at 23:15

## 2017-03-23 RX ADMIN — SENNA PLUS 2 TABLET(S): 8.6 TABLET ORAL at 22:32

## 2017-03-23 RX ADMIN — Medication 1 GRAM(S): at 22:34

## 2017-03-23 RX ADMIN — HEPARIN SODIUM 5000 UNIT(S): 5000 INJECTION INTRAVENOUS; SUBCUTANEOUS at 12:47

## 2017-03-23 NOTE — DIETITIAN INITIAL EVALUATION ADULT. - PROBLEM SELECTOR PLAN 3
- Pt presents after being scheduled for screening endoscopy which was canceled  - Will continue Lasix, hold spironolactone given elevated potassium  - No evidence of worsening ascites on CT a/p  - Per report GI would like TTE to evaluate heart before next Endoscopy  - Monitor for now, per pt will get endoscopy next week  - pt states she had a cath/angiogram in Feb '17, will need to call Dr. eli Baron for outpt records and for cardiac clearance.

## 2017-03-23 NOTE — DIETITIAN INITIAL EVALUATION ADULT. - NS AS NUTRI INTERV ED CONTENT3
Recommended modifications/Reinforced healthy fibers, drinking adequate fluids with fiber and limiting salt intake.  Also discussed benefits of cooking fresh foods from home and less processed.

## 2017-03-23 NOTE — DIETITIAN INITIAL EVALUATION ADULT. - SIGNS/SYMPTOMS
A1c 7.9, inconsistent meal planning, excessive CHO intake. Constipation with 20 pound weight gain, cirrhosis

## 2017-03-23 NOTE — DIETITIAN INITIAL EVALUATION ADULT. - NS AS NUTRI INTERV MEALS SNACK3
High fiber, low sodium, increased fluid intake./[place] - modified diet/Fluid - modified diet/Fiber - modified diet

## 2017-03-23 NOTE — DIETITIAN INITIAL EVALUATION ADULT. - NS AS NUTRI DX KNOWLEDGE BELIEFS
Limited adherence to nutrition - related recommendations/Food - and nutrition - related knowledge deficit/Self - monitoring deficit

## 2017-03-23 NOTE — DIETITIAN INITIAL EVALUATION ADULT. - PROBLEM SELECTOR PLAN 1
- Pt with 20lb weight gain over past 2 months with evidence on CT of large stool burden likely 2/2 chronic opioid use  - Will give miralax BID/stool softeners  - Will give suppository  - Monitor stools count

## 2017-03-23 NOTE — DIETITIAN INITIAL EVALUATION ADULT. - NS AS NUTRI INTERV ED CONTENT
Reviewed diabetic meal planning, balanced eating, need for protein at all meals, portion control and self monitoring./Purpose of the nutrition education/Recommended modifications

## 2017-03-23 NOTE — DIETITIAN INITIAL EVALUATION ADULT. - ENERGY NEEDS
HT 61 inches,  pounds,  pounds,  ponds, BMI with constipation issues is 33.7  Dxd with Cirrhosis 2/2 AMARO, Constipation with 20 pound weight gain  Skin intact

## 2017-03-23 NOTE — DIETITIAN INITIAL EVALUATION ADULT. - PROBLEM SELECTOR PLAN 6
- Continue Coreg, lisinopril  - pt states she had a cath/angiogram + echo in Feb '17, will need to call Dr. eli Baron for outpt records and for cardiac clearance.

## 2017-03-24 ENCOUNTER — TRANSCRIPTION ENCOUNTER (OUTPATIENT)
Age: 62
End: 2017-03-24

## 2017-03-24 VITALS — SYSTOLIC BLOOD PRESSURE: 103 MMHG | DIASTOLIC BLOOD PRESSURE: 69 MMHG | HEART RATE: 96 BPM

## 2017-03-24 LAB
ANION GAP SERPL CALC-SCNC: 13 MMOL/L — SIGNIFICANT CHANGE UP (ref 5–17)
BUN SERPL-MCNC: 21 MG/DL — SIGNIFICANT CHANGE UP (ref 7–23)
CALCIUM SERPL-MCNC: 9.1 MG/DL — SIGNIFICANT CHANGE UP (ref 8.4–10.5)
CHLORIDE SERPL-SCNC: 98 MMOL/L — SIGNIFICANT CHANGE UP (ref 96–108)
CO2 SERPL-SCNC: 22 MMOL/L — SIGNIFICANT CHANGE UP (ref 22–31)
CREAT SERPL-MCNC: 0.87 MG/DL — SIGNIFICANT CHANGE UP (ref 0.5–1.3)
GLUCOSE SERPL-MCNC: 216 MG/DL — HIGH (ref 70–99)
MAGNESIUM SERPL-MCNC: 1.8 MG/DL — SIGNIFICANT CHANGE UP (ref 1.6–2.6)
PHOSPHATE SERPL-MCNC: 2.8 MG/DL — SIGNIFICANT CHANGE UP (ref 2.5–4.5)
POTASSIUM SERPL-MCNC: 5 MMOL/L — SIGNIFICANT CHANGE UP (ref 3.5–5.3)
POTASSIUM SERPL-SCNC: 5 MMOL/L — SIGNIFICANT CHANGE UP (ref 3.5–5.3)
SODIUM SERPL-SCNC: 133 MMOL/L — LOW (ref 135–145)

## 2017-03-24 PROCEDURE — 94640 AIRWAY INHALATION TREATMENT: CPT

## 2017-03-24 PROCEDURE — 82553 CREATINE MB FRACTION: CPT

## 2017-03-24 PROCEDURE — 82330 ASSAY OF CALCIUM: CPT

## 2017-03-24 PROCEDURE — 82947 ASSAY GLUCOSE BLOOD QUANT: CPT

## 2017-03-24 PROCEDURE — 93010 ELECTROCARDIOGRAM REPORT: CPT

## 2017-03-24 PROCEDURE — 84300 ASSAY OF URINE SODIUM: CPT

## 2017-03-24 PROCEDURE — 71046 X-RAY EXAM CHEST 2 VIEWS: CPT

## 2017-03-24 PROCEDURE — G0378: CPT

## 2017-03-24 PROCEDURE — 96374 THER/PROPH/DIAG INJ IV PUSH: CPT | Mod: XU

## 2017-03-24 PROCEDURE — 82435 ASSAY OF BLOOD CHLORIDE: CPT

## 2017-03-24 PROCEDURE — 84484 ASSAY OF TROPONIN QUANT: CPT

## 2017-03-24 PROCEDURE — 84295 ASSAY OF SERUM SODIUM: CPT

## 2017-03-24 PROCEDURE — 83605 ASSAY OF LACTIC ACID: CPT

## 2017-03-24 PROCEDURE — 85610 PROTHROMBIN TIME: CPT

## 2017-03-24 PROCEDURE — 84100 ASSAY OF PHOSPHORUS: CPT

## 2017-03-24 PROCEDURE — 74177 CT ABD & PELVIS W/CONTRAST: CPT

## 2017-03-24 PROCEDURE — 83935 ASSAY OF URINE OSMOLALITY: CPT

## 2017-03-24 PROCEDURE — 80053 COMPREHEN METABOLIC PANEL: CPT

## 2017-03-24 PROCEDURE — 83880 ASSAY OF NATRIURETIC PEPTIDE: CPT

## 2017-03-24 PROCEDURE — 82550 ASSAY OF CK (CPK): CPT

## 2017-03-24 PROCEDURE — 85014 HEMATOCRIT: CPT

## 2017-03-24 PROCEDURE — 85027 COMPLETE CBC AUTOMATED: CPT

## 2017-03-24 PROCEDURE — 85730 THROMBOPLASTIN TIME PARTIAL: CPT

## 2017-03-24 PROCEDURE — 99285 EMERGENCY DEPT VISIT HI MDM: CPT | Mod: 25

## 2017-03-24 PROCEDURE — 84132 ASSAY OF SERUM POTASSIUM: CPT

## 2017-03-24 PROCEDURE — 83735 ASSAY OF MAGNESIUM: CPT

## 2017-03-24 PROCEDURE — 99239 HOSP IP/OBS DSCHRG MGMT >30: CPT

## 2017-03-24 PROCEDURE — 82803 BLOOD GASES ANY COMBINATION: CPT

## 2017-03-24 PROCEDURE — 93005 ELECTROCARDIOGRAM TRACING: CPT

## 2017-03-24 PROCEDURE — 83690 ASSAY OF LIPASE: CPT

## 2017-03-24 PROCEDURE — 80048 BASIC METABOLIC PNL TOTAL CA: CPT

## 2017-03-24 RX ORDER — CYCLOBENZAPRINE HYDROCHLORIDE 10 MG/1
1 TABLET, FILM COATED ORAL
Qty: 0 | Refills: 0 | COMMUNITY

## 2017-03-24 RX ORDER — METHYLNALTREXONE BROMIDE 12 MG/.6ML
12 INJECTION, SOLUTION SUBCUTANEOUS ONCE
Qty: 0 | Refills: 0 | Status: COMPLETED | OUTPATIENT
Start: 2017-03-24 | End: 2017-03-24

## 2017-03-24 RX ORDER — LEVALBUTEROL 1.25 MG/.5ML
2 SOLUTION, CONCENTRATE RESPIRATORY (INHALATION)
Qty: 1 | Refills: 0 | OUTPATIENT
Start: 2017-03-24

## 2017-03-24 RX ORDER — OXYCODONE HYDROCHLORIDE 5 MG/1
5 TABLET ORAL ONCE
Qty: 0 | Refills: 0 | Status: DISCONTINUED | OUTPATIENT
Start: 2017-03-24 | End: 2017-03-24

## 2017-03-24 RX ORDER — DOCUSATE SODIUM 100 MG
3 CAPSULE ORAL
Qty: 0 | Refills: 0 | COMMUNITY

## 2017-03-24 RX ORDER — FLUCONAZOLE 150 MG/1
200 TABLET ORAL ONCE
Qty: 0 | Refills: 0 | Status: DISCONTINUED | OUTPATIENT
Start: 2017-03-24 | End: 2017-03-24

## 2017-03-24 RX ORDER — CYCLOBENZAPRINE HYDROCHLORIDE 10 MG/1
1 TABLET, FILM COATED ORAL
Qty: 42 | Refills: 0 | OUTPATIENT
Start: 2017-03-24 | End: 2017-04-07

## 2017-03-24 RX ORDER — LACTULOSE 10 G/15ML
10 SOLUTION ORAL
Qty: 450 | Refills: 0 | OUTPATIENT
Start: 2017-03-24 | End: 2017-04-08

## 2017-03-24 RX ORDER — MIRTAZAPINE 45 MG/1
1 TABLET, ORALLY DISINTEGRATING ORAL
Qty: 0 | Refills: 0 | COMMUNITY
Start: 2017-03-24

## 2017-03-24 RX ADMIN — METHYLNALTREXONE BROMIDE 12 MILLIGRAM(S): 12 INJECTION, SOLUTION SUBCUTANEOUS at 16:22

## 2017-03-24 RX ADMIN — POLYETHYLENE GLYCOL 3350 17 GRAM(S): 17 POWDER, FOR SOLUTION ORAL at 18:37

## 2017-03-24 RX ADMIN — Medication 3 MILLILITER(S): at 06:14

## 2017-03-24 RX ADMIN — CARVEDILOL PHOSPHATE 3.12 MILLIGRAM(S): 80 CAPSULE, EXTENDED RELEASE ORAL at 18:38

## 2017-03-24 RX ADMIN — OXYCODONE HYDROCHLORIDE 5 MILLIGRAM(S): 5 TABLET ORAL at 17:00

## 2017-03-24 RX ADMIN — LISINOPRIL 20 MILLIGRAM(S): 2.5 TABLET ORAL at 06:43

## 2017-03-24 RX ADMIN — POLYETHYLENE GLYCOL 3350 17 GRAM(S): 17 POWDER, FOR SOLUTION ORAL at 06:45

## 2017-03-24 RX ADMIN — CARVEDILOL PHOSPHATE 3.12 MILLIGRAM(S): 80 CAPSULE, EXTENDED RELEASE ORAL at 06:41

## 2017-03-24 RX ADMIN — CYCLOBENZAPRINE HYDROCHLORIDE 10 MILLIGRAM(S): 10 TABLET, FILM COATED ORAL at 15:48

## 2017-03-24 RX ADMIN — Medication 4: at 18:37

## 2017-03-24 RX ADMIN — HEPARIN SODIUM 5000 UNIT(S): 5000 INJECTION INTRAVENOUS; SUBCUTANEOUS at 06:48

## 2017-03-24 RX ADMIN — GABAPENTIN 300 MILLIGRAM(S): 400 CAPSULE ORAL at 06:41

## 2017-03-24 RX ADMIN — Medication 1 GRAM(S): at 13:05

## 2017-03-24 RX ADMIN — Medication: at 13:07

## 2017-03-24 RX ADMIN — DULOXETINE HYDROCHLORIDE 60 MILLIGRAM(S): 30 CAPSULE, DELAYED RELEASE ORAL at 13:05

## 2017-03-24 RX ADMIN — Medication 100 MILLIGRAM(S): at 13:05

## 2017-03-24 RX ADMIN — Medication 3 MILLILITER(S): at 18:37

## 2017-03-24 RX ADMIN — OXYCODONE HYDROCHLORIDE 15 MILLIGRAM(S): 5 TABLET ORAL at 13:19

## 2017-03-24 RX ADMIN — OXYCODONE HYDROCHLORIDE 15 MILLIGRAM(S): 5 TABLET ORAL at 14:00

## 2017-03-24 RX ADMIN — Medication 3 MILLILITER(S): at 13:04

## 2017-03-24 RX ADMIN — Medication 100 MILLIGRAM(S): at 06:42

## 2017-03-24 RX ADMIN — Medication 25 MICROGRAM(S): at 06:41

## 2017-03-24 RX ADMIN — HEPARIN SODIUM 5000 UNIT(S): 5000 INJECTION INTRAVENOUS; SUBCUTANEOUS at 13:05

## 2017-03-24 RX ADMIN — Medication 1 GRAM(S): at 15:49

## 2017-03-24 RX ADMIN — LACTULOSE 20 GRAM(S): 10 SOLUTION ORAL at 06:47

## 2017-03-24 RX ADMIN — Medication 1 GRAM(S): at 09:45

## 2017-03-24 RX ADMIN — OXYCODONE HYDROCHLORIDE 5 MILLIGRAM(S): 5 TABLET ORAL at 16:22

## 2017-03-24 RX ADMIN — CYCLOBENZAPRINE HYDROCHLORIDE 10 MILLIGRAM(S): 10 TABLET, FILM COATED ORAL at 09:45

## 2017-03-24 RX ADMIN — GABAPENTIN 300 MILLIGRAM(S): 400 CAPSULE ORAL at 13:05

## 2017-03-24 RX ADMIN — Medication 2: at 09:45

## 2017-03-24 RX ADMIN — Medication 10 MILLIGRAM(S): at 13:05

## 2017-03-24 NOTE — DISCHARGE NOTE ADULT - HOSPITAL COURSE
61yoF PMH of  HTN, COPD, depression, hypothyroid, anxiety, disk herniation, TIAs, liver cirrhosis 2/2 AMARO sent from endoscopy suite for concerns of recent weight gain. Per the patient she has gained about 20lbs in the past month. She says that she has become very distended and that her clothes don't fit well anymore. She says that today she went to get a screening endoscopy for varices and was told by anesthesiology that she could not be cleared for anesthesia. Per the patient she was told that they got results from her cardiologist which showed heart disease and was concerned that she would do poorly during the procedure. Pt states that she has a history of disc herniation and is on chronic opioids. Pt states that she does have daily BMs but feels like they are not sufficient. Pt states that she is on Senna/colace daily. Pt states that she experienced some SOB today with wheezing. She says she took her albuterol inhaler with some improvement. Pt denies any worsening cough but did notice some yellow mucus this AM. Pt denies any further SOB at this time. Reports she had cath and echo as outpt in Feb '17 with Dr. Baron. Last EGD in '14 showing moderate esophageal portal gastropathy.    In the ED VS were stable. Labs showed some thrombocytopenia, hyperkalemia. CT abdomen/pelvis no bowel obstruction, constipation, distended gallbladder, cirrhosis with portal hypertension and splenomegaly. Pt admitted to medicine for further management of her constipation.  Patient was given multiple rounds of stool softeners, laxatives, and enemas with mild improvement in her bowel movements.  Patient eventually given a dose of methylnaltrexone and _______________.  Patient will be discharged home with plan for continued use of her bowel regimen therapy with planned follow up as an outpatient.     Of note, on F/U with patient's cardiologist, TTE showed no wall motion abnormalities but mild valvular dysfunction  Previous cardiac cath shows no flow limiting lesions. 61yoF PMH of  HTN, COPD, depression, hypothyroid, anxiety, disk herniation, TIAs, liver cirrhosis 2/2 AMARO sent from endoscopy suite for concerns of recent weight gain. Per the patient she has gained about 20lbs in the past month. She says that she has become very distended and that her clothes don't fit well anymore. She says that today she went to get a screening endoscopy for varices and was told by anesthesiology that she could not be cleared for anesthesia. Per the patient she was told that they got results from her cardiologist which showed heart disease and was concerned that she would do poorly during the procedure. Pt states that she has a history of disc herniation and is on chronic opioids. Pt states that she does have daily BMs but feels like they are not sufficient. Pt states that she is on Senna/colace daily. Pt states that she experienced some SOB today with wheezing. She says she took her albuterol inhaler with some improvement. Pt denies any worsening cough but did notice some yellow mucus this AM. Pt denies any further SOB at this time. Reports she had cath and echo as outpt in Feb '17 with Dr. Baron. Last EGD in '14 showing moderate esophageal portal gastropathy.    In the ED VS were stable. Labs showed some thrombocytopenia, hyperkalemia. CT abdomen/pelvis no bowel obstruction, constipation, distended gallbladder, cirrhosis with portal hypertension and splenomegaly. Pt admitted to medicine for further management of her constipation.  Patient was given multiple rounds of stool softeners, laxatives, and enemas with mild improvement in her bowel movements.  Patient eventually given a dose of methylnaltrexone and was discharged with a prescription for lactulose.  Patient will be discharged home with plan for continued use of her bowel regimen therapy with planned follow up as an outpatient.     Of note, on F/U with patient's cardiologist, TTE showed no wall motion abnormalities but mild valvular dysfunction  Previous cardiac cath shows no flow limiting lesions.

## 2017-03-24 NOTE — DISCHARGE NOTE ADULT - SECONDARY DIAGNOSIS.
Neuropathy Chronic obstructive pulmonary disease, unspecified COPD type Liver cirrhosis secondary to AMARO

## 2017-03-24 NOTE — DISCHARGE NOTE ADULT - MEDICATION SUMMARY - MEDICATIONS TO TAKE
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    oxyCODONE 15 mg oral tablet  -- 1 tab(s) by mouth every 6 hours, As Needed  -- Indication: For Chronic Pain    lisinopril 20 mg oral tablet  -- 1 tab(s) by mouth once a day  -- Indication: For HTN    KlonoPIN 0.5 mg oral tablet  -- 3 tab(s) by mouth once a day (at bedtime)  -- Indication: For Anxiety    Neurontin 300 mg oral capsule  -- 1 cap(s) by mouth 3 times a day  -- Indication: For Neuropathic Pain    Cymbalta 60 mg oral delayed release capsule  --  by mouth 2 times a day  -- Indication: For Neuropathic pain    trazodone 300 mg oral tablet  -- 1 tab(s) by mouth once a day (at bedtime)  -- Indication: For Depression    mirtazapine 45 mg oral tablet  -- 1 tab(s) by mouth once a day (at bedtime)  -- Indication: For Depression    metformin 1000 mg oral tablet  -- 1 tab(s) by mouth 2 times a day  -- Indication: For Diabetes    Coreg 3.125 mg oral tablet  -- 1 tab(s) by mouth 2 times a day  -- Indication: For Hypertension    Spiriva  --  inhaled once a day, As Needed  -- Indication: For Asthma    Xopenex HFA CFC free 45 mcg/inh inhalation aerosol  -- 2 puff(s) inhaled every 4 hours PRN  -- Indication: For Asthma    Senna  --  by mouth once a day (at bedtime)  -- Indication: For Need for prophylactic measure    lactulose 10 g/15 mL oral syrup  -- 10 milliliter(s) by mouth 3 times a day until stools are soft  -- Indication: For Constipation    Xifaxan 550 mg oral tablet  -- 1 tab(s) by mouth 2 times a day  -- Indication: For Cirrhosis    sucralfate 1 g oral tablet  -- 1 tab(s) by mouth 4 times a day (before meals and at bedtime)  -- Indication: For Need for prophylactic measure    cyclobenzaprine 10 mg oral tablet  -- 1 tab(s) by mouth 3 times a day, As Needed  -- Indication: For Neuropathic Pain    Synthroid 25 mcg (0.025 mg) oral tablet  -- 1 tab(s) by mouth once a day  -- Indication: For Hypothyroidism, unspecified type

## 2017-03-24 NOTE — DISCHARGE NOTE ADULT - CARE PROVIDER_API CALL
Marissa Perez), Gastroenterology; Internal Medicine  [address] [place] [address]  Phone: [phone]  Fax: [phone]    Gray Baron (MD), Cardiovascular Disease  [address] [place] [address] [address]  [place] [address]  Phone: [phone]  Fax: [phone]    Johnathon Mason), Neurology  [address] [facility] [address]  [place] [address]  Phone: [phone]  Fax: [phone]

## 2017-03-24 NOTE — DISCHARGE NOTE ADULT - PLAN OF CARE
Complete course of medication You were sent to the hospital for abdominal distension and weight gain. You were found to have severe constipation and received medications as well as enemas for this. Your constipation is likely due to your chronic oxycodone use. Please continue to take the prescribed lactulose until your stools are soft. You can also  magnesium citrate over the counter, which will also help with constipation. You reported right hand numbness. Below is contact information for [facility]. Please make an appointment with their service for your hand nerve symptoms. You were sent to the hospital for abdominal distension and weight gain. You were found to have severe constipation and received medications as well as enemas for this. Your constipation is likely due to your chronic oxycodone use. Please continue to take the prescribed lactulose until your stools are soft.

## 2017-03-24 NOTE — DISCHARGE NOTE ADULT - CARE PLAN
Principal Discharge DX:	Constipation, chronic  Goal:	Complete course of medication  Instructions for follow-up, activity and diet:	You were sent to the hospital for abdominal distension and weight gain. You were found to have severe constipation and received medications as well as enemas for this. Your constipation is likely due to your chronic oxycodone use. Please continue to take the prescribed lactulose until your stools are soft. You can also  magnesium citrate over the counter, which will also help with constipation.  Secondary Diagnosis:	Neuropathy  Instructions for follow-up, activity and diet:	You reported right hand numbness. Below is contact information for [facility]. Please make an appointment with their service for your hand nerve symptoms. Principal Discharge DX:	Constipation, chronic  Goal:	Complete course of medication  Instructions for follow-up, activity and diet:	You were sent to the hospital for abdominal distension and weight gain. You were found to have severe constipation and received medications as well as enemas for this. Your constipation is likely due to your chronic oxycodone use. Please continue to take the prescribed lactulose until your stools are soft. You can also  magnesium citrate over the counter, which will also help with constipation.  Secondary Diagnosis:	Neuropathy  Instructions for follow-up, activity and diet:	You reported right hand numbness. Below is contact information for [facility]. Please make an appointment with their service for your hand nerve symptoms. Principal Discharge DX:	Constipation, chronic  Goal:	Complete course of medication  Instructions for follow-up, activity and diet:	You were sent to the hospital for abdominal distension and weight gain. You were found to have severe constipation and received medications as well as enemas for this. Your constipation is likely due to your chronic oxycodone use. Please continue to take the prescribed lactulose until your stools are soft.  Secondary Diagnosis:	Neuropathy  Instructions for follow-up, activity and diet:	You reported right hand numbness. Below is contact information for [facility]. Please make an appointment with their service for your hand nerve symptoms.  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

## 2017-03-24 NOTE — DISCHARGE NOTE ADULT - CARE PROVIDERS DIRECT ADDRESSES
[email or web address],DirectAddress_Unknown,DirectAddress_Unknown,DirectAddress_Unknown

## 2017-03-24 NOTE — DISCHARGE NOTE ADULT - ADDITIONAL INSTRUCTIONS
Please make an appointment with your primary doctor and follow up as outpatient  Please  your lactulose prescription and continue to take until your bowel movements are soft

## 2017-03-27 ENCOUNTER — INPATIENT (INPATIENT)
Facility: HOSPITAL | Age: 62
LOS: 2 days | Discharge: HOME | DRG: 690 | End: 2017-03-30
Attending: HOSPITALIST | Admitting: HOSPITALIST
Payer: MEDICARE

## 2017-03-27 ENCOUNTER — APPOINTMENT (OUTPATIENT)
Dept: MRI IMAGING | Facility: CLINIC | Age: 62
End: 2017-03-27

## 2017-03-27 VITALS
HEART RATE: 81 BPM | OXYGEN SATURATION: 99 % | DIASTOLIC BLOOD PRESSURE: 61 MMHG | RESPIRATION RATE: 18 BRPM | TEMPERATURE: 99 F | SYSTOLIC BLOOD PRESSURE: 94 MMHG

## 2017-03-27 DIAGNOSIS — N39.0 URINARY TRACT INFECTION, SITE NOT SPECIFIED: ICD-10-CM

## 2017-03-27 LAB
ALBUMIN SERPL ELPH-MCNC: 4.1 G/DL — SIGNIFICANT CHANGE UP (ref 3.3–5)
ALP SERPL-CCNC: 138 U/L — HIGH (ref 40–120)
ALT FLD-CCNC: 38 U/L RC — SIGNIFICANT CHANGE UP (ref 10–45)
ANION GAP SERPL CALC-SCNC: 15 MMOL/L — SIGNIFICANT CHANGE UP (ref 5–17)
APPEARANCE UR: CLEAR — SIGNIFICANT CHANGE UP
APTT BLD: 29.3 SEC — SIGNIFICANT CHANGE UP (ref 27.5–37.4)
AST SERPL-CCNC: 35 U/L — SIGNIFICANT CHANGE UP (ref 10–40)
BACTERIA # UR AUTO: ABNORMAL /HPF
BASOPHILS # BLD AUTO: 0.1 K/UL — SIGNIFICANT CHANGE UP (ref 0–0.2)
BASOPHILS NFR BLD AUTO: 0.9 % — SIGNIFICANT CHANGE UP (ref 0–2)
BILIRUB SERPL-MCNC: 0.3 MG/DL — SIGNIFICANT CHANGE UP (ref 0.2–1.2)
BILIRUB UR-MCNC: NEGATIVE — SIGNIFICANT CHANGE UP
BUN SERPL-MCNC: 16 MG/DL — SIGNIFICANT CHANGE UP (ref 7–23)
CALCIUM SERPL-MCNC: 9.1 MG/DL — SIGNIFICANT CHANGE UP (ref 8.4–10.5)
CHLORIDE SERPL-SCNC: 102 MMOL/L — SIGNIFICANT CHANGE UP (ref 96–108)
CO2 SERPL-SCNC: 21 MMOL/L — LOW (ref 22–31)
COLOR SPEC: YELLOW — SIGNIFICANT CHANGE UP
COMMENT - URINE: SIGNIFICANT CHANGE UP
CREAT SERPL-MCNC: 0.98 MG/DL — SIGNIFICANT CHANGE UP (ref 0.5–1.3)
DIFF PNL FLD: NEGATIVE — SIGNIFICANT CHANGE UP
EOSINOPHIL # BLD AUTO: 0.2 K/UL — SIGNIFICANT CHANGE UP (ref 0–0.5)
EOSINOPHIL NFR BLD AUTO: 3.5 % — SIGNIFICANT CHANGE UP (ref 0–6)
EPI CELLS # UR: SIGNIFICANT CHANGE UP /HPF
GAS PNL BLDV: SIGNIFICANT CHANGE UP
GLUCOSE SERPL-MCNC: 98 MG/DL — SIGNIFICANT CHANGE UP (ref 70–99)
GLUCOSE UR QL: NEGATIVE — SIGNIFICANT CHANGE UP
HCT VFR BLD CALC: 35.3 % — SIGNIFICANT CHANGE UP (ref 34.5–45)
HGB BLD-MCNC: 11.7 G/DL — SIGNIFICANT CHANGE UP (ref 11.5–15.5)
HYALINE CASTS # UR AUTO: ABNORMAL
INR BLD: 1.09 RATIO — SIGNIFICANT CHANGE UP (ref 0.88–1.16)
KETONES UR-MCNC: NEGATIVE — SIGNIFICANT CHANGE UP
LEUKOCYTE ESTERASE UR-ACNC: ABNORMAL
LYMPHOCYTES # BLD AUTO: 1.6 K/UL — SIGNIFICANT CHANGE UP (ref 1–3.3)
LYMPHOCYTES # BLD AUTO: 25.1 % — SIGNIFICANT CHANGE UP (ref 13–44)
MCHC RBC-ENTMCNC: 28.3 PG — SIGNIFICANT CHANGE UP (ref 27–34)
MCHC RBC-ENTMCNC: 33.1 GM/DL — SIGNIFICANT CHANGE UP (ref 32–36)
MCV RBC AUTO: 85.5 FL — SIGNIFICANT CHANGE UP (ref 80–100)
MONOCYTES # BLD AUTO: 0.7 K/UL — SIGNIFICANT CHANGE UP (ref 0–0.9)
MONOCYTES NFR BLD AUTO: 10.4 % — SIGNIFICANT CHANGE UP (ref 2–14)
NEUTROPHILS # BLD AUTO: 3.8 K/UL — SIGNIFICANT CHANGE UP (ref 1.8–7.4)
NEUTROPHILS NFR BLD AUTO: 60.1 % — SIGNIFICANT CHANGE UP (ref 43–77)
NITRITE UR-MCNC: NEGATIVE — SIGNIFICANT CHANGE UP
PH UR: 5.5 — SIGNIFICANT CHANGE UP (ref 4.8–8)
PLATELET # BLD AUTO: 93 K/UL — LOW (ref 150–400)
POTASSIUM SERPL-MCNC: 4.6 MMOL/L — SIGNIFICANT CHANGE UP (ref 3.5–5.3)
POTASSIUM SERPL-SCNC: 4.6 MMOL/L — SIGNIFICANT CHANGE UP (ref 3.5–5.3)
PROT SERPL-MCNC: 7.6 G/DL — SIGNIFICANT CHANGE UP (ref 6–8.3)
PROT UR-MCNC: NEGATIVE — SIGNIFICANT CHANGE UP
PROTHROM AB SERPL-ACNC: 11.8 SEC — SIGNIFICANT CHANGE UP (ref 9.8–12.7)
RBC # BLD: 4.13 M/UL — SIGNIFICANT CHANGE UP (ref 3.8–5.2)
RBC # FLD: 14.9 % — HIGH (ref 10.3–14.5)
RBC CASTS # UR COMP ASSIST: SIGNIFICANT CHANGE UP /HPF (ref 0–2)
SODIUM SERPL-SCNC: 138 MMOL/L — SIGNIFICANT CHANGE UP (ref 135–145)
SP GR SPEC: 1.01 — LOW (ref 1.01–1.02)
UROBILINOGEN FLD QL: NEGATIVE — SIGNIFICANT CHANGE UP
WBC # BLD: 6.4 K/UL — SIGNIFICANT CHANGE UP (ref 3.8–10.5)
WBC # FLD AUTO: 6.4 K/UL — SIGNIFICANT CHANGE UP (ref 3.8–10.5)
WBC UR QL: SIGNIFICANT CHANGE UP /HPF (ref 0–5)

## 2017-03-27 PROCEDURE — 99285 EMERGENCY DEPT VISIT HI MDM: CPT | Mod: 25

## 2017-03-27 PROCEDURE — 93010 ELECTROCARDIOGRAM REPORT: CPT

## 2017-03-27 PROCEDURE — 74020: CPT | Mod: 26

## 2017-03-27 RX ORDER — MORPHINE SULFATE 50 MG/1
6 CAPSULE, EXTENDED RELEASE ORAL ONCE
Qty: 0 | Refills: 0 | Status: DISCONTINUED | OUTPATIENT
Start: 2017-03-27 | End: 2017-03-27

## 2017-03-27 RX ORDER — SODIUM CHLORIDE 9 MG/ML
500 INJECTION INTRAMUSCULAR; INTRAVENOUS; SUBCUTANEOUS ONCE
Qty: 0 | Refills: 0 | Status: COMPLETED | OUTPATIENT
Start: 2017-03-27 | End: 2017-03-27

## 2017-03-27 RX ORDER — CEFTRIAXONE 500 MG/1
1 INJECTION, POWDER, FOR SOLUTION INTRAMUSCULAR; INTRAVENOUS ONCE
Qty: 0 | Refills: 0 | Status: COMPLETED | OUTPATIENT
Start: 2017-03-27 | End: 2017-03-27

## 2017-03-27 RX ADMIN — CEFTRIAXONE 100 GRAM(S): 500 INJECTION, POWDER, FOR SOLUTION INTRAMUSCULAR; INTRAVENOUS at 23:01

## 2017-03-27 RX ADMIN — SODIUM CHLORIDE 500 MILLILITER(S): 9 INJECTION INTRAMUSCULAR; INTRAVENOUS; SUBCUTANEOUS at 20:20

## 2017-03-27 RX ADMIN — MORPHINE SULFATE 6 MILLIGRAM(S): 50 CAPSULE, EXTENDED RELEASE ORAL at 23:00

## 2017-03-27 RX ADMIN — MORPHINE SULFATE 6 MILLIGRAM(S): 50 CAPSULE, EXTENDED RELEASE ORAL at 21:17

## 2017-03-27 NOTE — ED PROVIDER NOTE - ATTENDING CONTRIBUTION TO CARE
61yoF hx cirrhosis 2/2 AMARO, DM, HTN, recently discharged from same hospital for abdominal pain, severe constipation, returns for weakness, abd distension with diarrhea from increased laxative use started on lactulose on d.c, hypotensive in the field, ivf given with improvement vss on arrival. abd soft, mildly distended, ct a few days ago with constipation on chronic opiates for back pain. labs wnl in ed slightl dehydrated to reassess after fluid bolus, check kub for constipation, ua.

## 2017-03-27 NOTE — ED PROVIDER NOTE - PHYSICAL EXAMINATION
Judy Corrales M.D.: patient awake alert seen lying on stretcher, tired appearing but NAD . LUNGS CTAB no wheeze no crackle. CARD RRR no m/r/g.  Abdomen tense, distended, mildly tender throughout abdomen no rebound no guarding no CVA tenderness. EXT WWP + pitting edema b/l LE no calf tenderness CV 2+DP/PT bilaterally. neuro A&Ox3.  skin warm and dry no rash

## 2017-03-27 NOTE — ED PROVIDER NOTE - OBJECTIVE STATEMENT
Judy Corrales M.D: 61yoF hx cirrhosis 2/2 AMARO, DM, HTN, recently discharged from same hospital for abdominal pain, severe constipation, BIBEMS for weakness and abdominal pain. pt notes she has been taking her lactulose for constipation and has been having freuqent large BMs, the last being today. pt has been drinking lots of fluids, but has been feeling weak and dehydrated. she started to have worsening abdominal distension today and abdominal pain and called the ambulance. +low grade fevers (100) denies CP, SOB, urinary symptoms. denies N/V +chronic back and R. arm pain

## 2017-03-27 NOTE — ED ADULT NURSE NOTE - PMH
Anemia    Anxiety    Anxiety and depression    Asthma    Chronic sinusitis    Cirrhosis    Constipation, chronic    COPD (chronic obstructive pulmonary disease)  no recent exacerb  Diabetes mellitus  type 2  Fall at home    Fatty liver    GERD (gastroesophageal reflux disease)    Hyperlipidemia    Hypertension    Lung nodules    Syncope and collapse  10/2016 with long hospital stay at [facility], with negative cardiac work up as per patient, s/p angiogram too with no blockages  as per patient. + right arm nerve damages.  Thrombocytopenia    TIA (transient ischemic attack)  10/2016 on ASA and Plavix as per Dr. Perez

## 2017-03-27 NOTE — ED PROVIDER NOTE - MEDICAL DECISION MAKING DETAILS
Judy Corrales M.D: 61yoF hx AMARO cirrhosis p/w weakness and dehydration. pt not encephalopathic. low concern for ascites given clear scan 5 days ago. will check electrolytes, gentle fluid resuscitation, urine, abdominal xray, reassess.

## 2017-03-27 NOTE — ED PROVIDER NOTE - PMH
Anemia    Anxiety    Anxiety and depression    Asthma    Chronic sinusitis    Cirrhosis    Constipation, chronic    COPD (chronic obstructive pulmonary disease)  no recent exacerb  Diabetes mellitus  type 2  Fall at home    Fatty liver    GERD (gastroesophageal reflux disease)    Hyperlipidemia    Hypertension    Lung nodules    Syncope and collapse  10/2016 with long hospital stay at [facility], with negative cardiac work up as per patient, s/p angiogram too with no blockages  as per patient. + right arm nerve damages.  Thrombocytopenia    TIA (transient ischemic attack)  10/2016 on ASA and Plavix as per Dr. Perez

## 2017-03-27 NOTE — ED ADULT NURSE NOTE - OBJECTIVE STATEMENT
61 y.o female aaox3 ambulatory with c/o generalized weakness and few episodes of soft bowel movement accomapnied by low back pain, h/o GERD, anxiety,masha constipation, afebrile, no nausea, vomiting or abdominal pain. IV access inserted by EMS, was given 500ml IVF NS.

## 2017-03-27 NOTE — ED ADULT NURSE REASSESSMENT NOTE - NS ED NURSE REASSESS COMMENT FT1
Patient admitted to medicine for UTI pending bed availability, no pending stat orders noted, will continue to monitor.

## 2017-03-28 ENCOUNTER — OTHER (OUTPATIENT)
Age: 62
End: 2017-03-28

## 2017-03-28 DIAGNOSIS — N39.0 URINARY TRACT INFECTION, SITE NOT SPECIFIED: ICD-10-CM

## 2017-03-28 DIAGNOSIS — R53.1 WEAKNESS: ICD-10-CM

## 2017-03-28 LAB
ANION GAP SERPL CALC-SCNC: 11 MMOL/L — SIGNIFICANT CHANGE UP (ref 5–17)
BASOPHILS # BLD AUTO: 0.02 K/UL — SIGNIFICANT CHANGE UP (ref 0–0.2)
BASOPHILS NFR BLD AUTO: 0.6 % — SIGNIFICANT CHANGE UP (ref 0–2)
BUN SERPL-MCNC: 12 MG/DL — SIGNIFICANT CHANGE UP (ref 7–23)
CALCIUM SERPL-MCNC: 8.4 MG/DL — SIGNIFICANT CHANGE UP (ref 8.4–10.5)
CHLORIDE SERPL-SCNC: 110 MMOL/L — HIGH (ref 96–108)
CO2 SERPL-SCNC: 19 MMOL/L — LOW (ref 22–31)
CREAT SERPL-MCNC: 0.78 MG/DL — SIGNIFICANT CHANGE UP (ref 0.5–1.3)
EOSINOPHIL # BLD AUTO: 0.14 K/UL — SIGNIFICANT CHANGE UP (ref 0–0.5)
EOSINOPHIL NFR BLD AUTO: 3.9 % — SIGNIFICANT CHANGE UP (ref 0–6)
GLUCOSE SERPL-MCNC: 189 MG/DL — HIGH (ref 70–99)
HCT VFR BLD CALC: 33 % — LOW (ref 34.5–45)
HGB BLD-MCNC: 10.5 G/DL — LOW (ref 11.5–15.5)
IMM GRANULOCYTES NFR BLD AUTO: 0.8 % — SIGNIFICANT CHANGE UP (ref 0–1.5)
LYMPHOCYTES # BLD AUTO: 1.21 K/UL — SIGNIFICANT CHANGE UP (ref 1–3.3)
LYMPHOCYTES # BLD AUTO: 33.9 % — SIGNIFICANT CHANGE UP (ref 13–44)
MAGNESIUM SERPL-MCNC: 1.9 MG/DL — SIGNIFICANT CHANGE UP (ref 1.6–2.6)
MCHC RBC-ENTMCNC: 27.3 PG — SIGNIFICANT CHANGE UP (ref 27–34)
MCHC RBC-ENTMCNC: 31.8 GM/DL — LOW (ref 32–36)
MCV RBC AUTO: 85.7 FL — SIGNIFICANT CHANGE UP (ref 80–100)
MONOCYTES # BLD AUTO: 0.44 K/UL — SIGNIFICANT CHANGE UP (ref 0–0.9)
MONOCYTES NFR BLD AUTO: 12.3 % — SIGNIFICANT CHANGE UP (ref 2–14)
NEUTROPHILS # BLD AUTO: 1.73 K/UL — LOW (ref 1.8–7.4)
NEUTROPHILS NFR BLD AUTO: 48.5 % — SIGNIFICANT CHANGE UP (ref 43–77)
PHOSPHATE SERPL-MCNC: 3.3 MG/DL — SIGNIFICANT CHANGE UP (ref 2.5–4.5)
PLATELET # BLD AUTO: 100 K/UL — LOW (ref 150–400)
POTASSIUM SERPL-MCNC: 4.9 MMOL/L — SIGNIFICANT CHANGE UP (ref 3.5–5.3)
POTASSIUM SERPL-SCNC: 4.9 MMOL/L — SIGNIFICANT CHANGE UP (ref 3.5–5.3)
RAPID RVP RESULT: SIGNIFICANT CHANGE UP
RBC # BLD: 3.85 M/UL — SIGNIFICANT CHANGE UP (ref 3.8–5.2)
RBC # FLD: 15.9 % — HIGH (ref 10.3–14.5)
SODIUM SERPL-SCNC: 140 MMOL/L — SIGNIFICANT CHANGE UP (ref 135–145)
WBC # BLD: 3.57 K/UL — LOW (ref 3.8–10.5)
WBC # FLD AUTO: 3.57 K/UL — LOW (ref 3.8–10.5)

## 2017-03-28 PROCEDURE — 99233 SBSQ HOSP IP/OBS HIGH 50: CPT | Mod: GC

## 2017-03-28 PROCEDURE — 99223 1ST HOSP IP/OBS HIGH 75: CPT | Mod: GC

## 2017-03-28 PROCEDURE — 71010: CPT | Mod: 26

## 2017-03-28 RX ORDER — CYCLOBENZAPRINE HYDROCHLORIDE 10 MG/1
10 TABLET, FILM COATED ORAL THREE TIMES A DAY
Qty: 0 | Refills: 0 | Status: DISCONTINUED | OUTPATIENT
Start: 2017-03-28 | End: 2017-03-30

## 2017-03-28 RX ORDER — INSULIN LISPRO 100/ML
VIAL (ML) SUBCUTANEOUS AT BEDTIME
Qty: 0 | Refills: 0 | Status: DISCONTINUED | OUTPATIENT
Start: 2017-03-28 | End: 2017-03-30

## 2017-03-28 RX ORDER — INFLUENZA VIRUS VACCINE 15; 15; 15; 15 UG/.5ML; UG/.5ML; UG/.5ML; UG/.5ML
0.5 SUSPENSION INTRAMUSCULAR ONCE
Qty: 0 | Refills: 0 | Status: DISCONTINUED | OUTPATIENT
Start: 2017-03-28 | End: 2017-03-30

## 2017-03-28 RX ORDER — DEXTROSE 50 % IN WATER 50 %
1 SYRINGE (ML) INTRAVENOUS ONCE
Qty: 0 | Refills: 0 | Status: DISCONTINUED | OUTPATIENT
Start: 2017-03-28 | End: 2017-03-30

## 2017-03-28 RX ORDER — SENNA PLUS 8.6 MG/1
2 TABLET ORAL AT BEDTIME
Qty: 0 | Refills: 0 | Status: DISCONTINUED | OUTPATIENT
Start: 2017-03-28 | End: 2017-03-30

## 2017-03-28 RX ORDER — MORPHINE SULFATE 50 MG/1
6 CAPSULE, EXTENDED RELEASE ORAL ONCE
Qty: 0 | Refills: 0 | Status: DISCONTINUED | OUTPATIENT
Start: 2017-03-28 | End: 2017-03-28

## 2017-03-28 RX ORDER — GLUCAGON INJECTION, SOLUTION 0.5 MG/.1ML
1 INJECTION, SOLUTION SUBCUTANEOUS ONCE
Qty: 0 | Refills: 0 | Status: DISCONTINUED | OUTPATIENT
Start: 2017-03-28 | End: 2017-03-30

## 2017-03-28 RX ORDER — INSULIN LISPRO 100/ML
VIAL (ML) SUBCUTANEOUS
Qty: 0 | Refills: 0 | Status: DISCONTINUED | OUTPATIENT
Start: 2017-03-28 | End: 2017-03-30

## 2017-03-28 RX ORDER — GABAPENTIN 400 MG/1
300 CAPSULE ORAL THREE TIMES A DAY
Qty: 0 | Refills: 0 | Status: DISCONTINUED | OUTPATIENT
Start: 2017-03-28 | End: 2017-03-30

## 2017-03-28 RX ORDER — SPIRONOLACTONE 25 MG/1
50 TABLET, FILM COATED ORAL DAILY
Qty: 0 | Refills: 0 | Status: DISCONTINUED | OUTPATIENT
Start: 2017-03-28 | End: 2017-03-29

## 2017-03-28 RX ORDER — DEXTROSE 50 % IN WATER 50 %
25 SYRINGE (ML) INTRAVENOUS ONCE
Qty: 0 | Refills: 0 | Status: DISCONTINUED | OUTPATIENT
Start: 2017-03-28 | End: 2017-03-30

## 2017-03-28 RX ORDER — OXYCODONE HYDROCHLORIDE 5 MG/1
5 TABLET ORAL EVERY 6 HOURS
Qty: 0 | Refills: 0 | Status: DISCONTINUED | OUTPATIENT
Start: 2017-03-28 | End: 2017-03-28

## 2017-03-28 RX ORDER — SODIUM CHLORIDE 9 MG/ML
1000 INJECTION, SOLUTION INTRAVENOUS ONCE
Qty: 0 | Refills: 0 | Status: COMPLETED | OUTPATIENT
Start: 2017-03-28 | End: 2017-03-28

## 2017-03-28 RX ORDER — TIOTROPIUM BROMIDE 18 UG/1
1 CAPSULE ORAL; RESPIRATORY (INHALATION) DAILY
Qty: 0 | Refills: 0 | Status: DISCONTINUED | OUTPATIENT
Start: 2017-03-28 | End: 2017-03-30

## 2017-03-28 RX ORDER — IPRATROPIUM/ALBUTEROL SULFATE 18-103MCG
3 AEROSOL WITH ADAPTER (GRAM) INHALATION EVERY 4 HOURS
Qty: 0 | Refills: 0 | Status: DISCONTINUED | OUTPATIENT
Start: 2017-03-28 | End: 2017-03-29

## 2017-03-28 RX ORDER — DOCUSATE SODIUM 100 MG
100 CAPSULE ORAL THREE TIMES A DAY
Qty: 0 | Refills: 0 | Status: DISCONTINUED | OUTPATIENT
Start: 2017-03-28 | End: 2017-03-30

## 2017-03-28 RX ORDER — SODIUM CHLORIDE 9 MG/ML
1000 INJECTION, SOLUTION INTRAVENOUS
Qty: 0 | Refills: 0 | Status: DISCONTINUED | OUTPATIENT
Start: 2017-03-28 | End: 2017-03-30

## 2017-03-28 RX ORDER — DEXTROSE 50 % IN WATER 50 %
12.5 SYRINGE (ML) INTRAVENOUS ONCE
Qty: 0 | Refills: 0 | Status: DISCONTINUED | OUTPATIENT
Start: 2017-03-28 | End: 2017-03-30

## 2017-03-28 RX ORDER — DULOXETINE HYDROCHLORIDE 30 MG/1
60 CAPSULE, DELAYED RELEASE ORAL DAILY
Qty: 0 | Refills: 0 | Status: DISCONTINUED | OUTPATIENT
Start: 2017-03-28 | End: 2017-03-30

## 2017-03-28 RX ORDER — OXYCODONE HYDROCHLORIDE 5 MG/1
15 TABLET ORAL EVERY 12 HOURS
Qty: 0 | Refills: 0 | Status: DISCONTINUED | OUTPATIENT
Start: 2017-03-28 | End: 2017-03-29

## 2017-03-28 RX ORDER — MIRTAZAPINE 45 MG/1
45 TABLET, ORALLY DISINTEGRATING ORAL AT BEDTIME
Qty: 0 | Refills: 0 | Status: DISCONTINUED | OUTPATIENT
Start: 2017-03-28 | End: 2017-03-30

## 2017-03-28 RX ORDER — SUCRALFATE 1 G
1 TABLET ORAL
Qty: 0 | Refills: 0 | Status: DISCONTINUED | OUTPATIENT
Start: 2017-03-28 | End: 2017-03-30

## 2017-03-28 RX ORDER — HEPARIN SODIUM 5000 [USP'U]/ML
5000 INJECTION INTRAVENOUS; SUBCUTANEOUS EVERY 8 HOURS
Qty: 0 | Refills: 0 | Status: DISCONTINUED | OUTPATIENT
Start: 2017-03-28 | End: 2017-03-29

## 2017-03-28 RX ORDER — CEFTRIAXONE 500 MG/1
1 INJECTION, POWDER, FOR SOLUTION INTRAMUSCULAR; INTRAVENOUS EVERY 24 HOURS
Qty: 0 | Refills: 0 | Status: DISCONTINUED | OUTPATIENT
Start: 2017-03-28 | End: 2017-03-30

## 2017-03-28 RX ORDER — TRAZODONE HCL 50 MG
300 TABLET ORAL AT BEDTIME
Qty: 0 | Refills: 0 | Status: DISCONTINUED | OUTPATIENT
Start: 2017-03-28 | End: 2017-03-30

## 2017-03-28 RX ORDER — LEVOTHYROXINE SODIUM 125 MCG
25 TABLET ORAL DAILY
Qty: 0 | Refills: 0 | Status: DISCONTINUED | OUTPATIENT
Start: 2017-03-28 | End: 2017-03-30

## 2017-03-28 RX ORDER — MORPHINE SULFATE 50 MG/1
2 CAPSULE, EXTENDED RELEASE ORAL EVERY 6 HOURS
Qty: 0 | Refills: 0 | Status: DISCONTINUED | OUTPATIENT
Start: 2017-03-28 | End: 2017-03-29

## 2017-03-28 RX ORDER — LISINOPRIL 2.5 MG/1
20 TABLET ORAL DAILY
Qty: 0 | Refills: 0 | Status: DISCONTINUED | OUTPATIENT
Start: 2017-03-28 | End: 2017-03-30

## 2017-03-28 RX ORDER — CLONAZEPAM 1 MG
1.5 TABLET ORAL AT BEDTIME
Qty: 0 | Refills: 0 | Status: DISCONTINUED | OUTPATIENT
Start: 2017-03-28 | End: 2017-03-30

## 2017-03-28 RX ADMIN — Medication 3: at 14:22

## 2017-03-28 RX ADMIN — SODIUM CHLORIDE 2000 MILLILITER(S): 9 INJECTION, SOLUTION INTRAVENOUS at 12:47

## 2017-03-28 RX ADMIN — TIOTROPIUM BROMIDE 1 CAPSULE(S): 18 CAPSULE ORAL; RESPIRATORY (INHALATION) at 12:19

## 2017-03-28 RX ADMIN — SPIRONOLACTONE 50 MILLIGRAM(S): 25 TABLET, FILM COATED ORAL at 12:19

## 2017-03-28 RX ADMIN — Medication 1: at 09:34

## 2017-03-28 RX ADMIN — MORPHINE SULFATE 6 MILLIGRAM(S): 50 CAPSULE, EXTENDED RELEASE ORAL at 05:11

## 2017-03-28 RX ADMIN — DULOXETINE HYDROCHLORIDE 60 MILLIGRAM(S): 30 CAPSULE, DELAYED RELEASE ORAL at 12:19

## 2017-03-28 RX ADMIN — MORPHINE SULFATE 2 MILLIGRAM(S): 50 CAPSULE, EXTENDED RELEASE ORAL at 14:09

## 2017-03-28 RX ADMIN — HEPARIN SODIUM 5000 UNIT(S): 5000 INJECTION INTRAVENOUS; SUBCUTANEOUS at 15:05

## 2017-03-28 RX ADMIN — GABAPENTIN 300 MILLIGRAM(S): 400 CAPSULE ORAL at 14:26

## 2017-03-28 RX ADMIN — GABAPENTIN 300 MILLIGRAM(S): 400 CAPSULE ORAL at 21:18

## 2017-03-28 RX ADMIN — Medication 100 MILLIGRAM(S): at 14:26

## 2017-03-28 RX ADMIN — MORPHINE SULFATE 2 MILLIGRAM(S): 50 CAPSULE, EXTENDED RELEASE ORAL at 14:40

## 2017-03-28 RX ADMIN — Medication 1 GRAM(S): at 18:21

## 2017-03-28 RX ADMIN — Medication 2: at 18:42

## 2017-03-28 RX ADMIN — GABAPENTIN 300 MILLIGRAM(S): 400 CAPSULE ORAL at 06:34

## 2017-03-28 RX ADMIN — SENNA PLUS 2 TABLET(S): 8.6 TABLET ORAL at 21:18

## 2017-03-28 RX ADMIN — HEPARIN SODIUM 5000 UNIT(S): 5000 INJECTION INTRAVENOUS; SUBCUTANEOUS at 21:18

## 2017-03-28 RX ADMIN — Medication 100 MILLIGRAM(S): at 21:18

## 2017-03-28 RX ADMIN — MORPHINE SULFATE 6 MILLIGRAM(S): 50 CAPSULE, EXTENDED RELEASE ORAL at 00:24

## 2017-03-28 RX ADMIN — Medication 25 MICROGRAM(S): at 06:34

## 2017-03-28 RX ADMIN — OXYCODONE HYDROCHLORIDE 15 MILLIGRAM(S): 5 TABLET ORAL at 18:50

## 2017-03-28 RX ADMIN — Medication 1 GRAM(S): at 12:19

## 2017-03-28 RX ADMIN — Medication 3 MILLILITER(S): at 21:18

## 2017-03-28 RX ADMIN — OXYCODONE HYDROCHLORIDE 15 MILLIGRAM(S): 5 TABLET ORAL at 18:20

## 2017-03-28 RX ADMIN — Medication 1 GRAM(S): at 21:18

## 2017-03-28 RX ADMIN — Medication 1 GRAM(S): at 06:34

## 2017-03-28 RX ADMIN — OXYCODONE HYDROCHLORIDE 15 MILLIGRAM(S): 5 TABLET ORAL at 06:34

## 2017-03-28 RX ADMIN — LISINOPRIL 20 MILLIGRAM(S): 2.5 TABLET ORAL at 12:18

## 2017-03-28 NOTE — H&P ADULT. - PMH
Anemia    Anxiety    Anxiety and depression    Asthma    Chronic sinusitis    Cirrhosis    Constipation, chronic    COPD (chronic obstructive pulmonary disease)  no recent exacerb  Diabetes mellitus  type 2  Fall at home    Fatty liver    GERD (gastroesophageal reflux disease)    Hyperlipidemia    Hypertension    Lung nodules    Syncope and collapse  10/2016 with long hospital stay at [facility], with negative cardiac work up as per patient, s/p angiogram too with no blockages  as per patient. + right arm nerve damages.  Thrombocytopenia    TIA (transient ischemic attack)  10/2016 on ASA and Plavix as per Dr. Perez

## 2017-03-28 NOTE — H&P ADULT. - OTHER
Old records reviewed:  EKG May 2014: NSR   colonoscopy Jun 2014: 3mm polyp in rectum  CXR March 22, 2017: clear  CT abdomen/pelv March 22, 2017: no bowel obstruction, distended gallbladder, cirrhosis of liver.

## 2017-03-28 NOTE — H&P ADULT. - PROBLEM SELECTOR PLAN 4
- Pt has history of COPD/Asthma with some sputum production today and wheezing  - Will continue Duoneb Q6h PRN  - Monitor respiratory status - Will continue Lasix, hold spironolactone given elevated potassium  - Pending screening EGD this week, likely postpone till next week - Will continue Lasix and spironolactone  - Pending screening EGD this week, likely postpone till next week - Patient not taking Lasix and spironolactone as per her GI  - Pending screening EGD this week, likely postpone till next week - Continue spironolactone, hold lasix will restart as need  - Screening EGD planned for this week, likely postpone given infection

## 2017-03-28 NOTE — H&P ADULT. - ASSESSMENT
61yoF PMH of  HTN, COPD, depression, hypothyroid, anxiety, disk herniation, TIAs, liver cirrhosis 2/2 AMARO brought in with generalized weakenss

## 2017-03-28 NOTE — H&P ADULT. - PROBLEM SELECTOR PLAN 6
- Continue Coreg, lisinopril  - pt states she had a cath/angiogram + echo in Feb '17, will need to call Dr. eli Baron for outpt records and for cardiac clearance. - On oral antiglycemics at home  - ISS, FS qmeals/qhs  - Monitor blood sugar

## 2017-03-28 NOTE — H&P ADULT. - PROBLEM SELECTOR PLAN 5
- On oral antiglycemics at home  - ISS, FS qmeals/qhs  - Monitor blood sugar - Pt has history of COPD/Asthma with some sputum production and cough  - Will continue Duoneb Q6h PRN  - Monitor respiratory status

## 2017-03-28 NOTE — H&P ADULT. - HISTORY OF PRESENT ILLNESS
61yoF PMH of  HTN, COPD, depression, hypothyroid, anxiety, disk herniation, TIAs, liver cirrhosis 2/2 AMARO brought in with generalized weakenss. Pt states that she has not felt right since discharge on Friday. She states that at that time has had progressively worsening generalized weakness which she says makes it difficult to ambulate. She says that on Saturday she took the lactulose she was discharged with for constipation and had multiple large BMs throughout the day. Pt says she was worried she was getting dehydrated so she drank lots of fluids, but has been feeling weak and dehydrated. she started to have worsening abdominal distension today and abdominal pain and called the ambulance. +low grade fevers (100) denies CP, SOB, urinary symptoms. denies N/V +chronic back and R. arm pain 61yoF PMH of  HTN, COPD, depression, hypothyroid, anxiety, disk herniation, TIAs, liver cirrhosis 2/2 AMARO brought in with generalized weakenss. Pt states that she has not felt right since discharge on Friday. She states that at that time has had progressively worsening generalized weakness which she says makes it difficult to ambulate. She says that on Saturday she took the lactulose she was discharged with for constipation and had multiple large BMs throughout the day. Pt says she was worried she was getting dehydrated so she drank lots of fluids, but has been feeling weak and dehydrated. Pt states she was worried about the progressively worsening weakness so called EMS and was brought in. Pt endorses low grade fevers (about 100 F) and states she had some dysuria with pain when she was previously admitted. She also notes some SOB and upper respiratory symptoms which she says has worsened since last visit. Pt says she has had increased cough with sputum production which is clear with green specs.    In the ED VS showed some hypotension but otherwise stable. Labs grossly normal accept abnormal UA. Pt admitted to medicine for further management.

## 2017-03-28 NOTE — H&P ADULT. - PROBLEM SELECTOR PLAN 3
- Will continue Lasix, hold spironolactone given elevated potassium  - Pending screening EGD this week, likely postpone till next week - Constipation resolved, resume home senna colace

## 2017-03-28 NOTE — H&P ADULT. - PROBLEM SELECTOR PLAN 2
- May be 2/2 UTI +/- URI given symptoms  - CXR  - RVP  - Follow up blood/urine cultures  - PT  - Monitor symptoms

## 2017-03-28 NOTE — H&P ADULT. - PROBLEM SELECTOR PLAN 7
- Continue synthroid - Continue Coreg, lisinopril  - pt states she had a cath/angiogram + echo in Feb '17, will need to call Dr. eli Baron for outpt records and for cardiac clearance. - Continue Coreg, lisinopril

## 2017-03-29 ENCOUNTER — TRANSCRIPTION ENCOUNTER (OUTPATIENT)
Age: 62
End: 2017-03-29

## 2017-03-29 ENCOUNTER — APPOINTMENT (OUTPATIENT)
Dept: GASTROENTEROLOGY | Facility: HOSPITAL | Age: 62
End: 2017-03-29

## 2017-03-29 LAB
ANION GAP SERPL CALC-SCNC: 12 MMOL/L — SIGNIFICANT CHANGE UP (ref 5–17)
BASOPHILS # BLD AUTO: 0.03 K/UL — SIGNIFICANT CHANGE UP (ref 0–0.2)
BASOPHILS NFR BLD AUTO: 0.9 % — SIGNIFICANT CHANGE UP (ref 0–2)
BUN SERPL-MCNC: 12 MG/DL — SIGNIFICANT CHANGE UP (ref 7–23)
CALCIUM SERPL-MCNC: 9.3 MG/DL — SIGNIFICANT CHANGE UP (ref 8.4–10.5)
CHLORIDE SERPL-SCNC: 103 MMOL/L — SIGNIFICANT CHANGE UP (ref 96–108)
CO2 SERPL-SCNC: 22 MMOL/L — SIGNIFICANT CHANGE UP (ref 22–31)
CREAT SERPL-MCNC: 0.81 MG/DL — SIGNIFICANT CHANGE UP (ref 0.5–1.3)
CULTURE RESULTS: NO GROWTH — SIGNIFICANT CHANGE UP
EOSINOPHIL # BLD AUTO: 0.03 K/UL — SIGNIFICANT CHANGE UP (ref 0–0.5)
EOSINOPHIL NFR BLD AUTO: 0.9 % — SIGNIFICANT CHANGE UP (ref 0–6)
GLUCOSE SERPL-MCNC: 221 MG/DL — HIGH (ref 70–99)
HCT VFR BLD CALC: 31.7 % — LOW (ref 34.5–45)
HGB BLD-MCNC: 10.3 G/DL — LOW (ref 11.5–15.5)
LYMPHOCYTES # BLD AUTO: 0.84 K/UL — LOW (ref 1–3.3)
LYMPHOCYTES # BLD AUTO: 28.8 % — SIGNIFICANT CHANGE UP (ref 13–44)
MAGNESIUM SERPL-MCNC: 1.9 MG/DL — SIGNIFICANT CHANGE UP (ref 1.6–2.6)
MCHC RBC-ENTMCNC: 27.5 PG — SIGNIFICANT CHANGE UP (ref 27–34)
MCHC RBC-ENTMCNC: 32.5 GM/DL — SIGNIFICANT CHANGE UP (ref 32–36)
MCV RBC AUTO: 84.8 FL — SIGNIFICANT CHANGE UP (ref 80–100)
MONOCYTES # BLD AUTO: 0.31 K/UL — SIGNIFICANT CHANGE UP (ref 0–0.9)
MONOCYTES NFR BLD AUTO: 10.8 % — SIGNIFICANT CHANGE UP (ref 2–14)
NEUTROPHILS # BLD AUTO: 1.67 K/UL — LOW (ref 1.8–7.4)
NEUTROPHILS NFR BLD AUTO: 57.7 % — SIGNIFICANT CHANGE UP (ref 43–77)
PHOSPHATE SERPL-MCNC: 3.6 MG/DL — SIGNIFICANT CHANGE UP (ref 2.5–4.5)
PLATELET # BLD AUTO: 76 K/UL — LOW (ref 150–400)
POTASSIUM SERPL-MCNC: 4.8 MMOL/L — SIGNIFICANT CHANGE UP (ref 3.5–5.3)
POTASSIUM SERPL-SCNC: 4.8 MMOL/L — SIGNIFICANT CHANGE UP (ref 3.5–5.3)
RAPID RVP RESULT: SIGNIFICANT CHANGE UP
RBC # BLD: 3.74 M/UL — LOW (ref 3.8–5.2)
RBC # FLD: 15.5 % — HIGH (ref 10.3–14.5)
SODIUM SERPL-SCNC: 137 MMOL/L — SIGNIFICANT CHANGE UP (ref 135–145)
SPECIMEN SOURCE: SIGNIFICANT CHANGE UP
WBC # BLD: 2.9 K/UL — LOW (ref 3.8–10.5)
WBC # FLD AUTO: 2.9 K/UL — LOW (ref 3.8–10.5)

## 2017-03-29 PROCEDURE — 99233 SBSQ HOSP IP/OBS HIGH 50: CPT | Mod: GC

## 2017-03-29 RX ORDER — SPIRONOLACTONE 25 MG/1
0 TABLET, FILM COATED ORAL
Qty: 0 | Refills: 0 | COMMUNITY

## 2017-03-29 RX ORDER — MORPHINE SULFATE 50 MG/1
4 CAPSULE, EXTENDED RELEASE ORAL EVERY 6 HOURS
Qty: 0 | Refills: 0 | Status: DISCONTINUED | OUTPATIENT
Start: 2017-03-29 | End: 2017-03-30

## 2017-03-29 RX ORDER — IPRATROPIUM/ALBUTEROL SULFATE 18-103MCG
3 AEROSOL WITH ADAPTER (GRAM) INHALATION EVERY 6 HOURS
Qty: 0 | Refills: 0 | Status: DISCONTINUED | OUTPATIENT
Start: 2017-03-29 | End: 2017-03-30

## 2017-03-29 RX ORDER — LACTULOSE 10 G/15ML
10 SOLUTION ORAL EVERY 8 HOURS
Qty: 0 | Refills: 0 | Status: DISCONTINUED | OUTPATIENT
Start: 2017-03-29 | End: 2017-03-30

## 2017-03-29 RX ORDER — SIMETHICONE 80 MG/1
80 TABLET, CHEWABLE ORAL EVERY 6 HOURS
Qty: 0 | Refills: 0 | Status: DISCONTINUED | OUTPATIENT
Start: 2017-03-29 | End: 2017-03-30

## 2017-03-29 RX ORDER — INSULIN LISPRO 100/ML
1 VIAL (ML) SUBCUTANEOUS
Qty: 0 | Refills: 0 | Status: DISCONTINUED | OUTPATIENT
Start: 2017-03-29 | End: 2017-03-30

## 2017-03-29 RX ORDER — MICONAZOLE NITRATE 2 %
1 CREAM (GRAM) TOPICAL
Qty: 0 | Refills: 0 | Status: DISCONTINUED | OUTPATIENT
Start: 2017-03-29 | End: 2017-03-30

## 2017-03-29 RX ORDER — INSULIN GLARGINE 100 [IU]/ML
3 INJECTION, SOLUTION SUBCUTANEOUS AT BEDTIME
Qty: 0 | Refills: 0 | Status: DISCONTINUED | OUTPATIENT
Start: 2017-03-29 | End: 2017-03-30

## 2017-03-29 RX ORDER — OXYCODONE HYDROCHLORIDE 5 MG/1
15 TABLET ORAL EVERY 6 HOURS
Qty: 0 | Refills: 0 | Status: DISCONTINUED | OUTPATIENT
Start: 2017-03-29 | End: 2017-03-30

## 2017-03-29 RX ADMIN — CYCLOBENZAPRINE HYDROCHLORIDE 10 MILLIGRAM(S): 10 TABLET, FILM COATED ORAL at 00:13

## 2017-03-29 RX ADMIN — Medication 1: at 08:50

## 2017-03-29 RX ADMIN — GABAPENTIN 300 MILLIGRAM(S): 400 CAPSULE ORAL at 15:03

## 2017-03-29 RX ADMIN — Medication 100 MILLIGRAM(S): at 22:12

## 2017-03-29 RX ADMIN — Medication 1.5 MILLIGRAM(S): at 00:02

## 2017-03-29 RX ADMIN — MORPHINE SULFATE 2 MILLIGRAM(S): 50 CAPSULE, EXTENDED RELEASE ORAL at 00:13

## 2017-03-29 RX ADMIN — OXYCODONE HYDROCHLORIDE 15 MILLIGRAM(S): 5 TABLET ORAL at 17:45

## 2017-03-29 RX ADMIN — Medication 1 GRAM(S): at 08:18

## 2017-03-29 RX ADMIN — LISINOPRIL 20 MILLIGRAM(S): 2.5 TABLET ORAL at 08:50

## 2017-03-29 RX ADMIN — MIRTAZAPINE 45 MILLIGRAM(S): 45 TABLET, ORALLY DISINTEGRATING ORAL at 00:03

## 2017-03-29 RX ADMIN — MORPHINE SULFATE 2 MILLIGRAM(S): 50 CAPSULE, EXTENDED RELEASE ORAL at 16:50

## 2017-03-29 RX ADMIN — INSULIN GLARGINE 3 UNIT(S): 100 INJECTION, SOLUTION SUBCUTANEOUS at 22:15

## 2017-03-29 RX ADMIN — Medication 1 UNIT(S): at 17:43

## 2017-03-29 RX ADMIN — DULOXETINE HYDROCHLORIDE 60 MILLIGRAM(S): 30 CAPSULE, DELAYED RELEASE ORAL at 12:12

## 2017-03-29 RX ADMIN — MORPHINE SULFATE 2 MILLIGRAM(S): 50 CAPSULE, EXTENDED RELEASE ORAL at 11:18

## 2017-03-29 RX ADMIN — Medication 1 APPLICATION(S): at 22:11

## 2017-03-29 RX ADMIN — Medication 1 GRAM(S): at 22:13

## 2017-03-29 RX ADMIN — MORPHINE SULFATE 4 MILLIGRAM(S): 50 CAPSULE, EXTENDED RELEASE ORAL at 23:43

## 2017-03-29 RX ADMIN — OXYCODONE HYDROCHLORIDE 15 MILLIGRAM(S): 5 TABLET ORAL at 12:12

## 2017-03-29 RX ADMIN — TIOTROPIUM BROMIDE 1 CAPSULE(S): 18 CAPSULE ORAL; RESPIRATORY (INHALATION) at 12:12

## 2017-03-29 RX ADMIN — MORPHINE SULFATE 2 MILLIGRAM(S): 50 CAPSULE, EXTENDED RELEASE ORAL at 10:48

## 2017-03-29 RX ADMIN — OXYCODONE HYDROCHLORIDE 15 MILLIGRAM(S): 5 TABLET ORAL at 06:19

## 2017-03-29 RX ADMIN — Medication 100 MILLIGRAM(S): at 15:03

## 2017-03-29 RX ADMIN — Medication 3 MILLILITER(S): at 12:42

## 2017-03-29 RX ADMIN — OXYCODONE HYDROCHLORIDE 15 MILLIGRAM(S): 5 TABLET ORAL at 18:45

## 2017-03-29 RX ADMIN — SENNA PLUS 2 TABLET(S): 8.6 TABLET ORAL at 22:15

## 2017-03-29 RX ADMIN — Medication 300 MILLIGRAM(S): at 00:03

## 2017-03-29 RX ADMIN — Medication 2: at 17:43

## 2017-03-29 RX ADMIN — OXYCODONE HYDROCHLORIDE 15 MILLIGRAM(S): 5 TABLET ORAL at 13:00

## 2017-03-29 RX ADMIN — Medication 1.5 MILLIGRAM(S): at 22:12

## 2017-03-29 RX ADMIN — LACTULOSE 10 GRAM(S): 10 SOLUTION ORAL at 22:55

## 2017-03-29 RX ADMIN — Medication 300 MILLIGRAM(S): at 22:55

## 2017-03-29 RX ADMIN — Medication 1 GRAM(S): at 17:47

## 2017-03-29 RX ADMIN — MORPHINE SULFATE 4 MILLIGRAM(S): 50 CAPSULE, EXTENDED RELEASE ORAL at 19:27

## 2017-03-29 RX ADMIN — CEFTRIAXONE 100 GRAM(S): 500 INJECTION, POWDER, FOR SOLUTION INTRAMUSCULAR; INTRAVENOUS at 22:15

## 2017-03-29 RX ADMIN — GABAPENTIN 300 MILLIGRAM(S): 400 CAPSULE ORAL at 22:12

## 2017-03-29 RX ADMIN — MORPHINE SULFATE 2 MILLIGRAM(S): 50 CAPSULE, EXTENDED RELEASE ORAL at 17:44

## 2017-03-29 RX ADMIN — CEFTRIAXONE 100 GRAM(S): 500 INJECTION, POWDER, FOR SOLUTION INTRAMUSCULAR; INTRAVENOUS at 01:22

## 2017-03-29 RX ADMIN — HEPARIN SODIUM 5000 UNIT(S): 5000 INJECTION INTRAVENOUS; SUBCUTANEOUS at 06:18

## 2017-03-29 RX ADMIN — MORPHINE SULFATE 2 MILLIGRAM(S): 50 CAPSULE, EXTENDED RELEASE ORAL at 00:43

## 2017-03-29 RX ADMIN — OXYCODONE HYDROCHLORIDE 15 MILLIGRAM(S): 5 TABLET ORAL at 06:27

## 2017-03-29 RX ADMIN — Medication 100 MILLIGRAM(S): at 06:18

## 2017-03-29 RX ADMIN — GABAPENTIN 300 MILLIGRAM(S): 400 CAPSULE ORAL at 06:18

## 2017-03-29 RX ADMIN — MORPHINE SULFATE 4 MILLIGRAM(S): 50 CAPSULE, EXTENDED RELEASE ORAL at 20:20

## 2017-03-29 RX ADMIN — Medication 25 MICROGRAM(S): at 06:18

## 2017-03-29 RX ADMIN — Medication: at 12:14

## 2017-03-29 RX ADMIN — SIMETHICONE 80 MILLIGRAM(S): 80 TABLET, CHEWABLE ORAL at 15:03

## 2017-03-29 RX ADMIN — MIRTAZAPINE 45 MILLIGRAM(S): 45 TABLET, ORALLY DISINTEGRATING ORAL at 22:12

## 2017-03-29 RX ADMIN — Medication 1 GRAM(S): at 12:12

## 2017-03-29 NOTE — PHYSICAL THERAPY INITIAL EVALUATION ADULT - PERTINENT HX OF CURRENT PROBLEM, REHAB EVAL
61yoF, pmhx below, p/w progressively generalized weakness. Pt reporting progressively worsening generalized weakness, dehydration making it difficult to ambulate. Reporting Saturday took the lactulose she was discharged with for constipation and had multiple large BMs throughout the day. Pt also reporting some SOB and upper respiratory symptoms which she says has worsened since last visit. CXR (-). xray abd Nonobstructive bowel gas pattern without pneumoperitoneum.

## 2017-03-29 NOTE — DISCHARGE NOTE ADULT - MEDICATION SUMMARY - MEDICATIONS TO TAKE
I will START or STAY ON the medications listed below when I get home from the hospital:    Sloan-track glaucometer and supplies  -- 1 application subcutaneous once a day  -- Indication: For Diabetes    oxyCODONE 15 mg oral tablet  -- 1 tab(s) by mouth every 6 hours, As Needed  -- Indication: For Disc herniation    lisinopril 20 mg oral tablet  -- 1 tab(s) by mouth once a day  -- Indication: For Hypertension    KlonoPIN 0.5 mg oral tablet  -- 3 tab(s) by mouth once a day (at bedtime)  -- Indication: For Anxiety and depression    Neurontin 300 mg oral capsule  -- 1 cap(s) by mouth 3 times a day  -- Indication: For Neuropathy    Cymbalta 60 mg oral delayed release capsule  --  by mouth 2 times a day  -- Indication: For Neuropathy    trazodone 300 mg oral tablet  -- 1 tab(s) by mouth once a day (at bedtime)  -- Indication: For Anxiety and depression    mirtazapine 45 mg oral tablet  -- 1 tab(s) by mouth once a day (at bedtime)  -- Indication: For Anxiety and depression    metformin 1000 mg oral tablet  -- 1 tab(s) by mouth 2 times a day  -- Indication: For Diabetes    oseltamivir 75 mg oral capsule  -- 1 cap(s) by mouth once a day  -- Check with your doctor before becoming pregnant.  Finish all this medication unless otherwise directed by prescriber.    -- Indication: For Flu PPx    Coreg 3.125 mg oral tablet  -- 1 tab(s) by mouth 2 times a day  -- Indication: For HTN    Spiriva  --  inhaled once a day, As Needed  -- Indication: For CoPD    Xopenex HFA CFC free 45 mcg/inh inhalation aerosol  -- 2 puff(s) inhaled every 4 hours PRN  -- Indication: For CoPD    lactulose 10 g/15 mL oral syrup  -- 15 milliliter(s) by mouth every 8 hours  -- Indication: For Constipation, chronic    Senna  --  by mouth once a day (at bedtime)  -- Indication: For Constipation, chronic    Xifaxan 550 mg oral tablet  -- 1 tab(s) by mouth 2 times a day  -- Indication: 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

## 2017-03-29 NOTE — DIETITIAN INITIAL EVALUATION ADULT. - ENERGY NEEDS
height 61 inches  weight 178.7 (3/29)  BMI 33.8 kg/m2   pounds +/- 10%, %%  No edema, no pressure ulcers

## 2017-03-29 NOTE — PHYSICAL THERAPY INITIAL EVALUATION ADULT - DISCHARGE DISPOSITION, PT EVAL
Home with PT for functional/safety assessment, gait/endurance training, general strengthening and fall risk prevention. pt would require assist with all ADLs and functional activities upon DC./home w/ assist/home w/ home PT

## 2017-03-29 NOTE — DIETITIAN INITIAL EVALUATION ADULT. - OTHER INFO
Nutrition consult received for A1c >7.0%. Patient presents with UTI, back pain and abdominal distention.  Patient is currently on a CSTCHOSN, DASH diet and reports she is consuming >75% of meals.  Patient complains of abdominal distention related to severe constipation- noted to be on colace. She reports that she is distended because of a backup of ~10 pounds of stool. She suspects constipation is related to pain medications for her back and high consumption of dry frosted mini wheats.  First BM was this AM, was large, dry and hard. No other c/o any nausea, diarrhea and/or vomiting.  No difficulty chewing or swallowing. Patient reports family history of food allergies to fish and shellfish.  Patient reports that her glucometer is broken and has been for 2 weeks (cites insurance problems for not getting a new one).  When glucometer was operating, patient reports to check BG levels at home at least 3x daily, before meals and 2 hours after each meal; BG levels were between 125-300mg/dl.   Patient received written and verbal nutrition education regarding consistent-carbohydrate diet.

## 2017-03-29 NOTE — DISCHARGE NOTE ADULT - PATIENT PORTAL LINK FT
“You can access the [facility] Patient Portal, offered by [facility], by registering with the following website: [email or web address]”

## 2017-03-29 NOTE — DISCHARGE NOTE ADULT - HOME CARE AGENCY
[place] [phone] for RN visit to assess for home P/T for start of care the day after discharge

## 2017-03-29 NOTE — PHYSICAL THERAPY INITIAL EVALUATION ADULT - ADDITIONAL COMMENTS
Pt lives alone in a condo, with elevator access, no steps to enter building. PTA, pt independent with all ADLs and functional activities with rollator. Pt owns cane, rollator, shower chair, grab bars, wheelchair, commode. Pt reporting friend usually comes and check on pt.

## 2017-03-29 NOTE — DISCHARGE NOTE ADULT - CARE PLAN
Principal Discharge DX:	Acute cystitis without hematuria  Goal:	Complete antibiotics  Secondary Diagnosis:	Constipation, chronic  Secondary Diagnosis:	Anxiety and depression  Secondary Diagnosis:	Cirrhosis of liver not due to alcohol Principal Discharge DX:	Acute cystitis without hematuria  Goal:	Complete antibiotics  Instructions for follow-up, activity and diet:	Please complete the remaining day of antibiotics. Take the medication as prescribed.  Secondary Diagnosis:	Constipation, chronic  Goal:	Regular bowel movement  Instructions for follow-up, activity and diet:	Take your medications as prescribed by your doctor.  Secondary Diagnosis:	Anxiety and depression  Goal:	Normal functioning  Instructions for follow-up, activity and diet:	Please take your medications as they are prescribed. Follow up with your PMD to help manage your medical problems  Secondary Diagnosis:	Cirrhosis of liver not due to alcohol  Goal:	Prevent decompensation  Instructions for follow-up, activity and diet:	Take your medications as prescribed. Please follow up with your GI doctor, Dr. Perez, to help manage your liver disease. Principal Discharge DX:	Acute cystitis without hematuria  Goal:	Complete antibiotics  Instructions for follow-up, activity and diet:	Please complete the remaining day of antibiotics. Take the medication as prescribed.  Secondary Diagnosis:	Constipation, chronic  Goal:	Regular bowel movement  Instructions for follow-up, activity and diet:	Take your medications as prescribed by your doctor.  Secondary Diagnosis:	Anxiety and depression  Goal:	Normal functioning  Instructions for follow-up, activity and diet:	Please take your medications as they are prescribed. Follow up with your PMD to help manage your medical problems  Secondary Diagnosis:	Cirrhosis of liver not due to alcohol  Goal:	Prevent decompensation  Instructions for follow-up, activity and diet:	Take your medications as prescribed. Please follow up with your GI doctor, Dr. Perez, to help manage your liver disease. His contact information is provided in the paper work

## 2017-03-29 NOTE — DIETITIAN INITIAL EVALUATION ADULT. - ORAL INTAKE PTA
fair/Patient reports she was eating 2 meals a day at home and has been depressed.  She no longer has her laid at home so there may be hours at a time where she does not eat.

## 2017-03-29 NOTE — DISCHARGE NOTE ADULT - CARE PROVIDERS DIRECT ADDRESSES
[email or web address],DirectAddress_Unknown,DirectAddress_Unknown

## 2017-03-29 NOTE — DIETITIAN INITIAL EVALUATION ADULT. - ADHERENCE
fair/Patient reports that she tries to follow a consistent-carbohydrate, low sodium diet at home.  She reports that she used to eat very well, was balancing her meals and was making good food choices.  She reports that she used to eat vegetables, chicken, beans, fruits.  recently, since the passing of her mother a few months ago, patient admits that she has not been following her therapeutic diet.  She has been eating pancakes, pizza and was snacking on frosted mini wheats cereal.  She finds that she is hungry at night and snacks on fruits.  Her A1c is 7.9% (3/21).

## 2017-03-29 NOTE — PROVIDER CONTACT NOTE (OTHER) - ASSESSMENT
Pt A&oX4. BP 99/64 all other VSS. Pt. denies SOB, chest pain, difficulty breathing. pt denies dizzines/lightheadedness.

## 2017-03-29 NOTE — DIETITIAN INITIAL EVALUATION ADULT. - PROBLEM SELECTOR PLAN 4
- Continue spironolactone, hold lasix will restart as need  - Screening EGD planned for this week, likely postpone given infection

## 2017-03-29 NOTE — DISCHARGE NOTE ADULT - HOSPITAL COURSE
61yoF PMH of  HTN, COPD, depression, hypothyroid, anxiety, disk herniation, TIAs, liver cirrhosis 2/2 AMARO brought in with generalized weakenss. Pt states that she has not felt right since discharge on Friday. She states that at that time has had progressively worsening generalized weakness which she says makes it difficult to ambulate. She says that on Saturday she took the lactulose she was discharged with for constipation and had multiple large BMs throughout the day. Pt says she was worried she was getting dehydrated so she drank lots of fluids, but has been feeling weak and dehydrated. Pt states she was worried about the progressively worsening weakness so called EMS and was brought in. Pt endorses low grade fevers (about 100 F) and states she had some dysuria with pain when she was previously admitted. She also notes some SOB and upper respiratory symptoms which she says has worsened since last visit. Pt says she has had increased cough with sputum production which is clear with green specs.    In the ED VS showed some hypotension but otherwise stable. Labs grossly normal accept abnormal UA. Pt admitted to medicine for further management 61yoF PMH of  HTN, COPD, depression, hypothyroid, anxiety, disk herniation, TIAs, liver cirrhosis 2/2 AMARO brought in with generalized weakenss. Pt states that she has not felt right since discharge on Friday. She states that at that time has had progressively worsening generalized weakness which she says makes it difficult to ambulate. She says that on Saturday she took the lactulose she was discharged with for constipation and had multiple large BMs throughout the day. Pt says she was worried she was getting dehydrated so she drank lots of fluids, but has been feeling weak and dehydrated. Pt states she was worried about the progressively worsening weakness so called EMS and was brought in. Pt endorses low grade fevers (about 100 F) and states she had some dysuria with pain when she was previously admitted. She also notes some SOB and upper respiratory symptoms which she says has worsened since last visit. Pt says she has had increased cough with sputum production which is clear with green specs.    In the ED VS showed some hypotension but otherwise stable. Labs grossly normal accept abnormal UA. Pt admitted to medicine for further management    RVP was done, which was negative. A CXR showed clear lungs with no acute lung disease. An abdominal x-ray series obtained showed a non-obstructive bowel gas pattern without pneumoperitoneum. She was treated empirically with IV antibiotics for 3-days for UTI. Urine cultures came back negative and she was switched to po Bactrim to complete 2 more days.    She was restarted on her home pain management medications in the hospital. She remained afebrile and stable. She was discharged home with home PT, and to follow up with her GI doctors for management of her liver disease. 61yoF PMH of  HTN, COPD, depression, hypothyroid, anxiety, disk herniation, TIAs, liver cirrhosis 2/2 AMARO brought in with generalized weakenss. Pt states that she has not felt right since discharge on Friday. She states that at that time has had progressively worsening generalized weakness which she says makes it difficult to ambulate. She says that on Saturday she took the lactulose she was discharged with for constipation and had multiple large BMs throughout the day. Pt says she was worried she was getting dehydrated so she drank lots of fluids, but has been feeling weak and dehydrated. Pt states she was worried about the progressively worsening weakness so called EMS and was brought in. Pt endorses low grade fevers (about 100 F) and states she had some dysuria with pain when she was previously admitted. She also notes some SOB and upper respiratory symptoms which she says has worsened since last visit. Pt says she has had increased cough with sputum production which is clear with green specs.    In the ED VS showed some hypotension but otherwise stable. Labs grossly normal accept abnormal UA. Pt admitted to medicine for further management    RVP was done, which was negative. A CXR showed clear lungs with no acute lung disease. An abdominal x-ray series obtained showed a non-obstructive bowel gas pattern without pneumoperitoneum. She was treated empirically with IV antibiotics for 3-days for UTI. Urine cultures came back negative and antibiotics were stopped after day 3. She shared a room with a patient who turned out to be influenza positive, and complained of  mild URI symptoms. She was discharged on Tamiflu for prophylaxis for five days.    She was restarted on her home pain management medications in the hospital. She remained afebrile and stable. She was discharged home with home PT, and to follow up with her GI doctor for management of her liver disease.

## 2017-03-29 NOTE — DIETITIAN INITIAL EVALUATION ADULT. - NS AS NUTRI INTERV ED CONTENT
Patient was given written and verbal education regarding consistent-carbohydrate diet including 1. counting carbohydrates 2. pairing carbohydrates with protein 3. good sources of carbohydrates 4. importance of self-monitoring/Other (specify)

## 2017-03-29 NOTE — DISCHARGE NOTE ADULT - PLAN OF CARE
Complete antibiotics Please complete the remaining day of antibiotics. Take the medication as prescribed. Regular bowel movement Take your medications as prescribed by your doctor. Normal functioning Please take your medications as they are prescribed. Follow up with your PMD to help manage your medical problems Prevent decompensation Take your medications as prescribed. Please follow up with your GI doctor, Dr. Perez, to help manage your liver disease. Take your medications as prescribed. Please follow up with your GI doctor, Dr. Perez, to help manage your liver disease. His contact information is provided in the paper work

## 2017-03-29 NOTE — DISCHARGE NOTE ADULT - CARE PROVIDER_API CALL
Marissa Perez), Gastroenterology; Internal Medicine  [address] [place] [id number]  Phone: [phone]  Fax: [phone]    Gray Baron), Cardiovascular Disease  [address] [place] [address]  Phone: [phone]  Fax: [phone]

## 2017-03-29 NOTE — DIETITIAN INITIAL EVALUATION ADULT. - SIGNS/SYMPTOMS
A1c of 7.9%, excessive CHO intake, verbalized incomplete knowledge of consistent carbohydrate diet as evidenced by severe constipation, abdominal distention

## 2017-03-30 VITALS
RESPIRATION RATE: 18 BRPM | TEMPERATURE: 99 F | OXYGEN SATURATION: 95 % | HEART RATE: 93 BPM | SYSTOLIC BLOOD PRESSURE: 106 MMHG | DIASTOLIC BLOOD PRESSURE: 64 MMHG

## 2017-03-30 LAB
ANION GAP SERPL CALC-SCNC: 16 MMOL/L — SIGNIFICANT CHANGE UP (ref 5–17)
BASOPHILS # BLD AUTO: 0.02 K/UL — SIGNIFICANT CHANGE UP (ref 0–0.2)
BASOPHILS NFR BLD AUTO: 0.5 % — SIGNIFICANT CHANGE UP (ref 0–2)
BUN SERPL-MCNC: 13 MG/DL — SIGNIFICANT CHANGE UP (ref 7–23)
CALCIUM SERPL-MCNC: 9.5 MG/DL — SIGNIFICANT CHANGE UP (ref 8.4–10.5)
CHLORIDE SERPL-SCNC: 97 MMOL/L — SIGNIFICANT CHANGE UP (ref 96–108)
CO2 SERPL-SCNC: 23 MMOL/L — SIGNIFICANT CHANGE UP (ref 22–31)
CREAT SERPL-MCNC: 0.83 MG/DL — SIGNIFICANT CHANGE UP (ref 0.5–1.3)
EOSINOPHIL # BLD AUTO: 0.14 K/UL — SIGNIFICANT CHANGE UP (ref 0–0.5)
EOSINOPHIL NFR BLD AUTO: 3.5 % — SIGNIFICANT CHANGE UP (ref 0–6)
GLUCOSE SERPL-MCNC: 301 MG/DL — HIGH (ref 70–99)
HCT VFR BLD CALC: 36.6 % — SIGNIFICANT CHANGE UP (ref 34.5–45)
HGB BLD-MCNC: 11.6 G/DL — SIGNIFICANT CHANGE UP (ref 11.5–15.5)
IMM GRANULOCYTES NFR BLD AUTO: 0.3 % — SIGNIFICANT CHANGE UP (ref 0–1.5)
LYMPHOCYTES # BLD AUTO: 1.5 K/UL — SIGNIFICANT CHANGE UP (ref 1–3.3)
LYMPHOCYTES # BLD AUTO: 37.7 % — SIGNIFICANT CHANGE UP (ref 13–44)
MAGNESIUM SERPL-MCNC: 2 MG/DL — SIGNIFICANT CHANGE UP (ref 1.6–2.6)
MCHC RBC-ENTMCNC: 27.6 PG — SIGNIFICANT CHANGE UP (ref 27–34)
MCHC RBC-ENTMCNC: 31.7 GM/DL — LOW (ref 32–36)
MCV RBC AUTO: 86.9 FL — SIGNIFICANT CHANGE UP (ref 80–100)
MONOCYTES # BLD AUTO: 0.53 K/UL — SIGNIFICANT CHANGE UP (ref 0–0.9)
MONOCYTES NFR BLD AUTO: 13.3 % — SIGNIFICANT CHANGE UP (ref 2–14)
NEUTROPHILS # BLD AUTO: 1.78 K/UL — LOW (ref 1.8–7.4)
NEUTROPHILS NFR BLD AUTO: 44.7 % — SIGNIFICANT CHANGE UP (ref 43–77)
PHOSPHATE SERPL-MCNC: 3.9 MG/DL — SIGNIFICANT CHANGE UP (ref 2.5–4.5)
PLATELET # BLD AUTO: 101 K/UL — LOW (ref 150–400)
POTASSIUM SERPL-MCNC: 5 MMOL/L — SIGNIFICANT CHANGE UP (ref 3.5–5.3)
POTASSIUM SERPL-SCNC: 5 MMOL/L — SIGNIFICANT CHANGE UP (ref 3.5–5.3)
RBC # BLD: 4.21 M/UL — SIGNIFICANT CHANGE UP (ref 3.8–5.2)
RBC # FLD: 15.4 % — HIGH (ref 10.3–14.5)
SODIUM SERPL-SCNC: 136 MMOL/L — SIGNIFICANT CHANGE UP (ref 135–145)
WBC # BLD: 3.98 K/UL — SIGNIFICANT CHANGE UP (ref 3.8–10.5)
WBC # FLD AUTO: 3.98 K/UL — SIGNIFICANT CHANGE UP (ref 3.8–10.5)

## 2017-03-30 PROCEDURE — 80053 COMPREHEN METABOLIC PANEL: CPT

## 2017-03-30 PROCEDURE — 82947 ASSAY GLUCOSE BLOOD QUANT: CPT

## 2017-03-30 PROCEDURE — 84295 ASSAY OF SERUM SODIUM: CPT

## 2017-03-30 PROCEDURE — 81001 URINALYSIS AUTO W/SCOPE: CPT

## 2017-03-30 PROCEDURE — 82435 ASSAY OF BLOOD CHLORIDE: CPT

## 2017-03-30 PROCEDURE — 85027 COMPLETE CBC AUTOMATED: CPT

## 2017-03-30 PROCEDURE — 82330 ASSAY OF CALCIUM: CPT

## 2017-03-30 PROCEDURE — 97162 PT EVAL MOD COMPLEX 30 MIN: CPT

## 2017-03-30 PROCEDURE — 82803 BLOOD GASES ANY COMBINATION: CPT

## 2017-03-30 PROCEDURE — 85610 PROTHROMBIN TIME: CPT

## 2017-03-30 PROCEDURE — 83735 ASSAY OF MAGNESIUM: CPT

## 2017-03-30 PROCEDURE — 84132 ASSAY OF SERUM POTASSIUM: CPT

## 2017-03-30 PROCEDURE — 99285 EMERGENCY DEPT VISIT HI MDM: CPT | Mod: 25

## 2017-03-30 PROCEDURE — 87633 RESP VIRUS 12-25 TARGETS: CPT

## 2017-03-30 PROCEDURE — 83605 ASSAY OF LACTIC ACID: CPT

## 2017-03-30 PROCEDURE — 74020: CPT

## 2017-03-30 PROCEDURE — 96375 TX/PRO/DX INJ NEW DRUG ADDON: CPT

## 2017-03-30 PROCEDURE — G0378: CPT

## 2017-03-30 PROCEDURE — 80048 BASIC METABOLIC PNL TOTAL CA: CPT

## 2017-03-30 PROCEDURE — 85730 THROMBOPLASTIN TIME PARTIAL: CPT

## 2017-03-30 PROCEDURE — 93005 ELECTROCARDIOGRAM TRACING: CPT

## 2017-03-30 PROCEDURE — 87086 URINE CULTURE/COLONY COUNT: CPT

## 2017-03-30 PROCEDURE — 84100 ASSAY OF PHOSPHORUS: CPT

## 2017-03-30 PROCEDURE — 71045 X-RAY EXAM CHEST 1 VIEW: CPT

## 2017-03-30 PROCEDURE — 96374 THER/PROPH/DIAG INJ IV PUSH: CPT

## 2017-03-30 PROCEDURE — 85014 HEMATOCRIT: CPT

## 2017-03-30 PROCEDURE — 94640 AIRWAY INHALATION TREATMENT: CPT

## 2017-03-30 PROCEDURE — 99232 SBSQ HOSP IP/OBS MODERATE 35: CPT | Mod: GC

## 2017-03-30 PROCEDURE — 87798 DETECT AGENT NOS DNA AMP: CPT

## 2017-03-30 PROCEDURE — 87486 CHLMYD PNEUM DNA AMP PROBE: CPT

## 2017-03-30 PROCEDURE — 87581 M.PNEUMON DNA AMP PROBE: CPT

## 2017-03-30 RX ORDER — CEFTRIAXONE 500 MG/1
1 INJECTION, POWDER, FOR SOLUTION INTRAMUSCULAR; INTRAVENOUS EVERY 24 HOURS
Qty: 0 | Refills: 0 | Status: DISCONTINUED | OUTPATIENT
Start: 2017-03-30 | End: 2017-03-30

## 2017-03-30 RX ORDER — DIPHENHYDRAMINE HCL 50 MG
25 CAPSULE ORAL ONCE
Qty: 0 | Refills: 0 | Status: COMPLETED | OUTPATIENT
Start: 2017-03-30 | End: 2017-03-30

## 2017-03-30 RX ORDER — INSULIN GLARGINE 100 [IU]/ML
6 INJECTION, SOLUTION SUBCUTANEOUS AT BEDTIME
Qty: 0 | Refills: 0 | Status: DISCONTINUED | OUTPATIENT
Start: 2017-03-30 | End: 2017-03-30

## 2017-03-30 RX ORDER — INSULIN LISPRO 100/ML
2 VIAL (ML) SUBCUTANEOUS
Qty: 0 | Refills: 0 | Status: DISCONTINUED | OUTPATIENT
Start: 2017-03-30 | End: 2017-03-30

## 2017-03-30 RX ORDER — LACTULOSE 10 G/15ML
15 SOLUTION ORAL
Qty: 315 | Refills: 0 | OUTPATIENT
Start: 2017-03-30 | End: 2017-04-06

## 2017-03-30 RX ORDER — SIMETHICONE 80 MG/1
1 TABLET, CHEWABLE ORAL
Qty: 28 | Refills: 0 | OUTPATIENT
Start: 2017-03-30 | End: 2017-04-06

## 2017-03-30 RX ADMIN — MORPHINE SULFATE 4 MILLIGRAM(S): 50 CAPSULE, EXTENDED RELEASE ORAL at 00:13

## 2017-03-30 RX ADMIN — Medication 25 MICROGRAM(S): at 06:24

## 2017-03-30 RX ADMIN — MORPHINE SULFATE 4 MILLIGRAM(S): 50 CAPSULE, EXTENDED RELEASE ORAL at 13:02

## 2017-03-30 RX ADMIN — Medication 1 UNIT(S): at 09:06

## 2017-03-30 RX ADMIN — LISINOPRIL 20 MILLIGRAM(S): 2.5 TABLET ORAL at 06:24

## 2017-03-30 RX ADMIN — GABAPENTIN 300 MILLIGRAM(S): 400 CAPSULE ORAL at 13:00

## 2017-03-30 RX ADMIN — OXYCODONE HYDROCHLORIDE 15 MILLIGRAM(S): 5 TABLET ORAL at 12:58

## 2017-03-30 RX ADMIN — Medication 2 UNIT(S): at 17:54

## 2017-03-30 RX ADMIN — SIMETHICONE 80 MILLIGRAM(S): 80 TABLET, CHEWABLE ORAL at 17:54

## 2017-03-30 RX ADMIN — Medication 100 MILLIGRAM(S): at 06:24

## 2017-03-30 RX ADMIN — DULOXETINE HYDROCHLORIDE 60 MILLIGRAM(S): 30 CAPSULE, DELAYED RELEASE ORAL at 11:50

## 2017-03-30 RX ADMIN — Medication 1 GRAM(S): at 12:58

## 2017-03-30 RX ADMIN — OXYCODONE HYDROCHLORIDE 15 MILLIGRAM(S): 5 TABLET ORAL at 07:25

## 2017-03-30 RX ADMIN — TIOTROPIUM BROMIDE 1 CAPSULE(S): 18 CAPSULE ORAL; RESPIRATORY (INHALATION) at 11:54

## 2017-03-30 RX ADMIN — Medication 1 GRAM(S): at 09:06

## 2017-03-30 RX ADMIN — Medication 1 APPLICATION(S): at 17:55

## 2017-03-30 RX ADMIN — Medication 100 MILLIGRAM(S): at 13:00

## 2017-03-30 RX ADMIN — LACTULOSE 10 GRAM(S): 10 SOLUTION ORAL at 13:00

## 2017-03-30 RX ADMIN — MORPHINE SULFATE 4 MILLIGRAM(S): 50 CAPSULE, EXTENDED RELEASE ORAL at 06:26

## 2017-03-30 RX ADMIN — MORPHINE SULFATE 4 MILLIGRAM(S): 50 CAPSULE, EXTENDED RELEASE ORAL at 11:45

## 2017-03-30 RX ADMIN — GABAPENTIN 300 MILLIGRAM(S): 400 CAPSULE ORAL at 06:24

## 2017-03-30 RX ADMIN — MORPHINE SULFATE 4 MILLIGRAM(S): 50 CAPSULE, EXTENDED RELEASE ORAL at 07:01

## 2017-03-30 RX ADMIN — OXYCODONE HYDROCHLORIDE 15 MILLIGRAM(S): 5 TABLET ORAL at 13:40

## 2017-03-30 RX ADMIN — Medication 2 UNIT(S): at 11:47

## 2017-03-30 RX ADMIN — LACTULOSE 10 GRAM(S): 10 SOLUTION ORAL at 06:24

## 2017-03-30 RX ADMIN — Medication 2: at 09:06

## 2017-03-30 RX ADMIN — Medication 4: at 11:48

## 2017-03-30 RX ADMIN — Medication 3 MILLILITER(S): at 11:51

## 2017-03-30 RX ADMIN — Medication 1 APPLICATION(S): at 06:25

## 2017-03-30 RX ADMIN — Medication 1: at 17:53

## 2017-03-30 RX ADMIN — Medication 25 MILLIGRAM(S): at 14:20

## 2017-03-30 RX ADMIN — OXYCODONE HYDROCHLORIDE 15 MILLIGRAM(S): 5 TABLET ORAL at 17:55

## 2017-03-30 RX ADMIN — CYCLOBENZAPRINE HYDROCHLORIDE 10 MILLIGRAM(S): 10 TABLET, FILM COATED ORAL at 15:54

## 2017-03-30 RX ADMIN — Medication 3 MILLILITER(S): at 06:40

## 2017-03-30 RX ADMIN — OXYCODONE HYDROCHLORIDE 15 MILLIGRAM(S): 5 TABLET ORAL at 07:00

## 2017-03-30 RX ADMIN — Medication 1 GRAM(S): at 17:54

## 2017-03-30 NOTE — PROVIDER CONTACT NOTE (MEDICATION) - ASSESSMENT
pt axox4. VSS. pt states pain controlled with morphine IV 4 mg. pt now sleeping comfortably. pt refused oxycodone 15 mg PO.

## 2017-04-03 ENCOUNTER — INPATIENT (INPATIENT)
Facility: HOSPITAL | Age: 62
LOS: 4 days | Discharge: HOME | DRG: 552 | End: 2017-04-08
Attending: HOSPITALIST | Admitting: INTERNAL MEDICINE
Payer: MEDICARE

## 2017-04-03 VITALS
HEART RATE: 80 BPM | DIASTOLIC BLOOD PRESSURE: 65 MMHG | SYSTOLIC BLOOD PRESSURE: 95 MMHG | RESPIRATION RATE: 20 BRPM | OXYGEN SATURATION: 100 % | TEMPERATURE: 99 F

## 2017-04-03 DIAGNOSIS — R55 SYNCOPE AND COLLAPSE: ICD-10-CM

## 2017-04-03 LAB
ALBUMIN SERPL ELPH-MCNC: 3.8 G/DL — SIGNIFICANT CHANGE UP (ref 3.3–5)
ALP SERPL-CCNC: 135 U/L — HIGH (ref 40–120)
ALT FLD-CCNC: 41 U/L RC — SIGNIFICANT CHANGE UP (ref 10–45)
ANION GAP SERPL CALC-SCNC: 17 MMOL/L — SIGNIFICANT CHANGE UP (ref 5–17)
APPEARANCE UR: CLEAR — SIGNIFICANT CHANGE UP
APTT BLD: 29.3 SEC — SIGNIFICANT CHANGE UP (ref 27.5–37.4)
AST SERPL-CCNC: 62 U/L — HIGH (ref 10–40)
BACTERIA # UR AUTO: ABNORMAL /HPF
BASE EXCESS BLDV CALC-SCNC: -0.8 MMOL/L — SIGNIFICANT CHANGE UP (ref -2–2)
BASOPHILS # BLD AUTO: 0 K/UL — SIGNIFICANT CHANGE UP (ref 0–0.2)
BASOPHILS NFR BLD AUTO: 0.2 % — SIGNIFICANT CHANGE UP (ref 0–2)
BILIRUB SERPL-MCNC: 0.3 MG/DL — SIGNIFICANT CHANGE UP (ref 0.2–1.2)
BILIRUB UR-MCNC: NEGATIVE — SIGNIFICANT CHANGE UP
BUN SERPL-MCNC: 15 MG/DL — SIGNIFICANT CHANGE UP (ref 7–23)
CA-I SERPL-SCNC: 1.22 MMOL/L — SIGNIFICANT CHANGE UP (ref 1.12–1.3)
CALCIUM SERPL-MCNC: 9.1 MG/DL — SIGNIFICANT CHANGE UP (ref 8.4–10.5)
CHLORIDE BLDV-SCNC: 102 MMOL/L — SIGNIFICANT CHANGE UP (ref 96–108)
CHLORIDE SERPL-SCNC: 97 MMOL/L — SIGNIFICANT CHANGE UP (ref 96–108)
CK MB CFR SERPL CALC: 2.3 NG/ML — SIGNIFICANT CHANGE UP (ref 0–3.8)
CO2 BLDV-SCNC: 26 MMOL/L — SIGNIFICANT CHANGE UP (ref 22–30)
CO2 SERPL-SCNC: 19 MMOL/L — LOW (ref 22–31)
COLOR SPEC: COLORLESS — SIGNIFICANT CHANGE UP
CREAT SERPL-MCNC: 1.06 MG/DL — SIGNIFICANT CHANGE UP (ref 0.5–1.3)
DIFF PNL FLD: ABNORMAL
EOSINOPHIL # BLD AUTO: 0.2 K/UL — SIGNIFICANT CHANGE UP (ref 0–0.5)
EOSINOPHIL NFR BLD AUTO: 3.7 % — SIGNIFICANT CHANGE UP (ref 0–6)
EPI CELLS # UR: SIGNIFICANT CHANGE UP /HPF
GAS PNL BLDV: 133 MMOL/L — LOW (ref 136–145)
GAS PNL BLDV: SIGNIFICANT CHANGE UP
GAS PNL BLDV: SIGNIFICANT CHANGE UP
GLUCOSE BLDV-MCNC: 111 MG/DL — HIGH (ref 70–99)
GLUCOSE SERPL-MCNC: 113 MG/DL — HIGH (ref 70–99)
GLUCOSE UR QL: NEGATIVE — SIGNIFICANT CHANGE UP
HCO3 BLDV-SCNC: 25 MMOL/L — SIGNIFICANT CHANGE UP (ref 21–29)
HCT VFR BLD CALC: 33.4 % — LOW (ref 34.5–45)
HCT VFR BLDA CALC: 34 % — LOW (ref 39–50)
HGB BLD CALC-MCNC: 10.9 G/DL — LOW (ref 11.5–15.5)
HGB BLD-MCNC: 11.3 G/DL — LOW (ref 11.5–15.5)
INR BLD: 1.1 RATIO — SIGNIFICANT CHANGE UP (ref 0.88–1.16)
KETONES UR-MCNC: NEGATIVE — SIGNIFICANT CHANGE UP
LACTATE BLDV-MCNC: 2.6 MMOL/L — HIGH (ref 0.7–2)
LEUKOCYTE ESTERASE UR-ACNC: NEGATIVE — SIGNIFICANT CHANGE UP
LIDOCAIN IGE QN: 58 U/L — SIGNIFICANT CHANGE UP (ref 7–60)
LYMPHOCYTES # BLD AUTO: 1.7 K/UL — SIGNIFICANT CHANGE UP (ref 1–3.3)
LYMPHOCYTES # BLD AUTO: 28.4 % — SIGNIFICANT CHANGE UP (ref 13–44)
MCHC RBC-ENTMCNC: 29.4 PG — SIGNIFICANT CHANGE UP (ref 27–34)
MCHC RBC-ENTMCNC: 33.9 GM/DL — SIGNIFICANT CHANGE UP (ref 32–36)
MCV RBC AUTO: 86.6 FL — SIGNIFICANT CHANGE UP (ref 80–100)
MONOCYTES # BLD AUTO: 0.5 K/UL — SIGNIFICANT CHANGE UP (ref 0–0.9)
MONOCYTES NFR BLD AUTO: 9.4 % — SIGNIFICANT CHANGE UP (ref 2–14)
NEUTROPHILS # BLD AUTO: 3.4 K/UL — SIGNIFICANT CHANGE UP (ref 1.8–7.4)
NEUTROPHILS NFR BLD AUTO: 58.3 % — SIGNIFICANT CHANGE UP (ref 43–77)
NITRITE UR-MCNC: NEGATIVE — SIGNIFICANT CHANGE UP
NT-PROBNP SERPL-SCNC: 77 PG/ML — SIGNIFICANT CHANGE UP (ref 0–300)
PCO2 BLDV: 49 MMHG — SIGNIFICANT CHANGE UP (ref 35–50)
PH BLDV: 7.33 — LOW (ref 7.35–7.45)
PH UR: 6 — SIGNIFICANT CHANGE UP (ref 4.8–8)
PLATELET # BLD AUTO: 100 K/UL — LOW (ref 150–400)
PO2 BLDV: 47 MMHG — HIGH (ref 25–45)
POTASSIUM BLDV-SCNC: 4.2 MMOL/L — SIGNIFICANT CHANGE UP (ref 3.5–5)
POTASSIUM SERPL-MCNC: 5.7 MMOL/L — HIGH (ref 3.5–5.3)
POTASSIUM SERPL-SCNC: 5.7 MMOL/L — HIGH (ref 3.5–5.3)
PROT SERPL-MCNC: 7.7 G/DL — SIGNIFICANT CHANGE UP (ref 6–8.3)
PROT UR-MCNC: NEGATIVE — SIGNIFICANT CHANGE UP
PROTHROM AB SERPL-ACNC: 11.9 SEC — SIGNIFICANT CHANGE UP (ref 9.8–12.7)
RAPID RVP RESULT: SIGNIFICANT CHANGE UP
RBC # BLD: 3.86 M/UL — SIGNIFICANT CHANGE UP (ref 3.8–5.2)
RBC # FLD: 15.3 % — HIGH (ref 10.3–14.5)
RBC CASTS # UR COMP ASSIST: ABNORMAL /HPF (ref 0–2)
SAO2 % BLDV: 75 % — SIGNIFICANT CHANGE UP (ref 67–88)
SODIUM SERPL-SCNC: 133 MMOL/L — LOW (ref 135–145)
SP GR SPEC: 1.01 — LOW (ref 1.01–1.02)
TROPONIN T SERPL-MCNC: <0.01 NG/ML — SIGNIFICANT CHANGE UP (ref 0–0.06)
UROBILINOGEN FLD QL: NEGATIVE — SIGNIFICANT CHANGE UP
WBC # BLD: 5.8 K/UL — SIGNIFICANT CHANGE UP (ref 3.8–10.5)
WBC # FLD AUTO: 5.8 K/UL — SIGNIFICANT CHANGE UP (ref 3.8–10.5)
WBC UR QL: SIGNIFICANT CHANGE UP /HPF (ref 0–5)

## 2017-04-03 PROCEDURE — 72170 X-RAY EXAM OF PELVIS: CPT | Mod: 26

## 2017-04-03 PROCEDURE — 70450 CT HEAD/BRAIN W/O DYE: CPT | Mod: 26

## 2017-04-03 PROCEDURE — 71010: CPT | Mod: 26

## 2017-04-03 PROCEDURE — 99285 EMERGENCY DEPT VISIT HI MDM: CPT

## 2017-04-03 PROCEDURE — 99223 1ST HOSP IP/OBS HIGH 75: CPT

## 2017-04-03 RX ORDER — FENTANYL CITRATE 50 UG/ML
25 INJECTION INTRAVENOUS ONCE
Qty: 0 | Refills: 0 | Status: DISCONTINUED | OUTPATIENT
Start: 2017-04-03 | End: 2017-04-03

## 2017-04-03 RX ORDER — DEXTROSE 50 % IN WATER 50 %
1 SYRINGE (ML) INTRAVENOUS ONCE
Qty: 0 | Refills: 0 | Status: DISCONTINUED | OUTPATIENT
Start: 2017-04-03 | End: 2017-04-08

## 2017-04-03 RX ORDER — SODIUM CHLORIDE 9 MG/ML
2000 INJECTION INTRAMUSCULAR; INTRAVENOUS; SUBCUTANEOUS ONCE
Qty: 0 | Refills: 0 | Status: COMPLETED | OUTPATIENT
Start: 2017-04-03 | End: 2017-04-03

## 2017-04-03 RX ORDER — OXYCODONE HYDROCHLORIDE 5 MG/1
15 TABLET ORAL EVERY 6 HOURS
Qty: 0 | Refills: 0 | Status: DISCONTINUED | OUTPATIENT
Start: 2017-04-03 | End: 2017-04-08

## 2017-04-03 RX ORDER — GABAPENTIN 400 MG/1
300 CAPSULE ORAL THREE TIMES A DAY
Qty: 0 | Refills: 0 | Status: DISCONTINUED | OUTPATIENT
Start: 2017-04-03 | End: 2017-04-08

## 2017-04-03 RX ORDER — DULOXETINE HYDROCHLORIDE 30 MG/1
60 CAPSULE, DELAYED RELEASE ORAL
Qty: 0 | Refills: 0 | Status: DISCONTINUED | OUTPATIENT
Start: 2017-04-03 | End: 2017-04-08

## 2017-04-03 RX ORDER — LACTULOSE 10 G/15ML
10 SOLUTION ORAL EVERY 8 HOURS
Qty: 0 | Refills: 0 | Status: DISCONTINUED | OUTPATIENT
Start: 2017-04-03 | End: 2017-04-08

## 2017-04-03 RX ORDER — MIRTAZAPINE 45 MG/1
45 TABLET, ORALLY DISINTEGRATING ORAL AT BEDTIME
Qty: 0 | Refills: 0 | Status: DISCONTINUED | OUTPATIENT
Start: 2017-04-03 | End: 2017-04-08

## 2017-04-03 RX ORDER — DEXTROSE 50 % IN WATER 50 %
25 SYRINGE (ML) INTRAVENOUS ONCE
Qty: 0 | Refills: 0 | Status: DISCONTINUED | OUTPATIENT
Start: 2017-04-03 | End: 2017-04-08

## 2017-04-03 RX ORDER — CLONAZEPAM 1 MG
1.5 TABLET ORAL AT BEDTIME
Qty: 0 | Refills: 0 | Status: DISCONTINUED | OUTPATIENT
Start: 2017-04-03 | End: 2017-04-08

## 2017-04-03 RX ORDER — SODIUM CHLORIDE 9 MG/ML
1000 INJECTION, SOLUTION INTRAVENOUS
Qty: 0 | Refills: 0 | Status: DISCONTINUED | OUTPATIENT
Start: 2017-04-03 | End: 2017-04-08

## 2017-04-03 RX ORDER — INSULIN LISPRO 100/ML
VIAL (ML) SUBCUTANEOUS
Qty: 0 | Refills: 0 | Status: DISCONTINUED | OUTPATIENT
Start: 2017-04-03 | End: 2017-04-08

## 2017-04-03 RX ORDER — TRAZODONE HCL 50 MG
300 TABLET ORAL AT BEDTIME
Qty: 0 | Refills: 0 | Status: DISCONTINUED | OUTPATIENT
Start: 2017-04-03 | End: 2017-04-08

## 2017-04-03 RX ORDER — MORPHINE SULFATE 50 MG/1
4 CAPSULE, EXTENDED RELEASE ORAL EVERY 6 HOURS
Qty: 0 | Refills: 0 | Status: DISCONTINUED | OUTPATIENT
Start: 2017-04-03 | End: 2017-04-07

## 2017-04-03 RX ORDER — SENNA PLUS 8.6 MG/1
2 TABLET ORAL AT BEDTIME
Qty: 0 | Refills: 0 | Status: DISCONTINUED | OUTPATIENT
Start: 2017-04-03 | End: 2017-04-08

## 2017-04-03 RX ORDER — DEXTROSE 50 % IN WATER 50 %
12.5 SYRINGE (ML) INTRAVENOUS ONCE
Qty: 0 | Refills: 0 | Status: DISCONTINUED | OUTPATIENT
Start: 2017-04-03 | End: 2017-04-08

## 2017-04-03 RX ORDER — INSULIN LISPRO 100/ML
VIAL (ML) SUBCUTANEOUS AT BEDTIME
Qty: 0 | Refills: 0 | Status: DISCONTINUED | OUTPATIENT
Start: 2017-04-03 | End: 2017-04-08

## 2017-04-03 RX ORDER — SIMETHICONE 80 MG/1
80 TABLET, CHEWABLE ORAL EVERY 6 HOURS
Qty: 0 | Refills: 0 | Status: DISCONTINUED | OUTPATIENT
Start: 2017-04-03 | End: 2017-04-08

## 2017-04-03 RX ORDER — ACETAMINOPHEN 500 MG
1000 TABLET ORAL ONCE
Qty: 0 | Refills: 0 | Status: COMPLETED | OUTPATIENT
Start: 2017-04-03 | End: 2017-04-03

## 2017-04-03 RX ORDER — TIOTROPIUM BROMIDE 18 UG/1
1 CAPSULE ORAL; RESPIRATORY (INHALATION) DAILY
Qty: 0 | Refills: 0 | Status: DISCONTINUED | OUTPATIENT
Start: 2017-04-03 | End: 2017-04-08

## 2017-04-03 RX ORDER — LEVOTHYROXINE SODIUM 125 MCG
25 TABLET ORAL DAILY
Qty: 0 | Refills: 0 | Status: DISCONTINUED | OUTPATIENT
Start: 2017-04-03 | End: 2017-04-08

## 2017-04-03 RX ORDER — SUCRALFATE 1 G
1 TABLET ORAL
Qty: 0 | Refills: 0 | Status: DISCONTINUED | OUTPATIENT
Start: 2017-04-03 | End: 2017-04-08

## 2017-04-03 RX ORDER — GLUCAGON INJECTION, SOLUTION 0.5 MG/.1ML
1 INJECTION, SOLUTION SUBCUTANEOUS ONCE
Qty: 0 | Refills: 0 | Status: DISCONTINUED | OUTPATIENT
Start: 2017-04-03 | End: 2017-04-08

## 2017-04-03 RX ORDER — CYCLOBENZAPRINE HYDROCHLORIDE 10 MG/1
10 TABLET, FILM COATED ORAL THREE TIMES A DAY
Qty: 0 | Refills: 0 | Status: DISCONTINUED | OUTPATIENT
Start: 2017-04-03 | End: 2017-04-08

## 2017-04-03 RX ORDER — HEPARIN SODIUM 5000 [USP'U]/ML
5000 INJECTION INTRAVENOUS; SUBCUTANEOUS EVERY 8 HOURS
Qty: 0 | Refills: 0 | Status: DISCONTINUED | OUTPATIENT
Start: 2017-04-03 | End: 2017-04-06

## 2017-04-03 RX ADMIN — SODIUM CHLORIDE 1000 MILLILITER(S): 9 INJECTION INTRAMUSCULAR; INTRAVENOUS; SUBCUTANEOUS at 18:45

## 2017-04-03 NOTE — H&P ADULT. - PMH
Anemia    Anxiety    Anxiety and depression    Asthma    Chronic sinusitis    Cirrhosis    Constipation, chronic    COPD (chronic obstructive pulmonary disease)  no recent exacerb  Diabetes mellitus  type 2  Fall at home    Fatty liver    GERD (gastroesophageal reflux disease)    Hyperlipidemia    Hypertension    Lung nodules    Syncope and collapse  10/2016 with long hospital stay at [facility], with negative cardiac work up as per patient, s/p angiogram too with no blockages  as per patient. + right arm nerve damages.  Thrombocytopenia    TIA (transient ischemic attack)  10/2016 on ASA and Plavix as per Dr. Perez

## 2017-04-03 NOTE — ED PROVIDER NOTE - PMH
Anemia    Anxiety    Anxiety and depression    Asthma    Chronic sinusitis    Cirrhosis    Constipation, chronic    COPD (chronic obstructive pulmonary disease)  no recent exacerb  Diabetes mellitus  type 2  Fall at home    Fatty liver    GERD (gastroesophageal reflux disease)    Hyperlipidemia    Hypertension    Lung nodules    Syncope and collapse  10/2016 with long hospital stay at [facility], with negative cardiac work up as per patient, s/p angiogram too with no blockages  as per patient. + right arm nerve damages.  Thrombocytopenia    TIA (transient ischemic attack)  10/2016 on ASA and Plavix as per Dr. Perez

## 2017-04-03 NOTE — H&P ADULT. - ASSESSMENT
60 yo woman with PMH of  HTN, HLD, COPD (not on home O2), liver cirrhosis 2/2 AMARO, hypothyroid, anxiety/depression (documented), Lumbar Laminectomy with disk herniation, TIAs, brought in from home in setting of near syncope.

## 2017-04-03 NOTE — H&P ADULT. - PROBLEM SELECTOR PLAN 1
Concern for cardiac etiology vs vasovagal. Patient currently not demonstrating neurological deficits at this time.  EKG currently without acute changes. CE thus far negative. No gross metabolic derangements   Trend CE x3, Check TSH  Check orthostatics  Consider TTE  Monitor on tele  Fall precautions

## 2017-04-03 NOTE — ED PROVIDER NOTE - NS ED MD SCRIBE ATTENDING SCRIBE SECTIONS
VITAL SIGNS( Pullset)/REVIEW OF SYSTEMS/DISPOSITION/PAST MEDICAL/SURGICAL/SOCIAL HISTORY/HISTORY OF PRESENT ILLNESS/PHYSICAL EXAM/HIV

## 2017-04-03 NOTE — ED PROVIDER NOTE - MEDICAL DECISION MAKING DETAILS
62 y/o female with recent hospitalization for chronic constipation as well as hypotension/generalized weakness/UTI presents s/o syncopal episodes and hypotensive in ED to 80/50. Denies CP, fevers, increased abdominal pain. Pt c/o chronic lower back pain, but no new bowel, bladder incontinence, no focal weakness. On exam neuro intact with midline lumbar tenderness, mid abdominal tenderness. R/o sepsis, Cardiac, metabolic, heme, endocrine, or structural. Plan to obtain sepsis labs, cardiac enzymes, EKG, CT brain, Cxr, pelvis x-ray, IV fluids, and admission.

## 2017-04-03 NOTE — H&P ADULT. - PROBLEM SELECTOR PLAN 3
Acute on chronic back pain. Hx of Laminectomy with worsening disc herniation and stenosis. Per patient, states that she had acute exacerbation in last hospitalization and was given Morphine IV in last admission.  Istop Ref # [id number]  Continue home regimen of Oxycodone 15 q6h PRN, Cyclobenazaprine 10 TID PRN, Neurotonin 300 mg TID  Short course of Morphine 4 mg IV prn for severe pain.  Primary day team to contact Pain management MD with regards to regimen

## 2017-04-03 NOTE — H&P ADULT. - RADIOLOGY RESULTS AND INTERPRETATION
Personally reviewed CXR: no appreciable focal consolidations Personally reviewed CXR: no appreciable focal consolidations  Prelim Pelvis Xray: no acute fractures

## 2017-04-03 NOTE — ED ADULT NURSE NOTE - PMH
Anemia    Anxiety    Anxiety and depression    Asthma    Chronic sinusitis    Cirrhosis    Constipation, chronic    COPD (chronic obstructive pulmonary disease)  no recent exacerb  Diabetes mellitus  type 2  Fall at home    Fatty liver    GERD (gastroesophageal reflux disease)    Hyperlipidemia    Hypertension    Lung nodules    Syncope and collapse  10/2016 with long hospital stay at [facility], with negative cardiac work up as per patient, s/p angiogram too with no blockages  as per patient. + right arm nerve damages.  Thrombocytopenia    TIA (transient ischemic attack)  10/2016 on ASA and Plavix as per Dr. Perez

## 2017-04-03 NOTE — H&P ADULT. - PROBLEM SELECTOR PLAN 2
Per patient does not endorse over use of pain medications nor other meds. No clear history of drug induced.  No clear infectious etiology at this time with objective data:  RVP negative  UA not suggestive of UTI  CXR not suggestive of Pneumonia  F/U Blood cultures and urine cultures  C/W Maintenance IV Fluids  Hold Lisinopril and Coreg Per patient does not endorse over use of pain medications nor other meds. No clear history of drug induced per patient but concern for polypharmacy.  No clear infectious etiology at this time with objective data:  RVP negative  UA not suggestive of UTI  CXR not suggestive of Pneumonia  F/U Blood cultures and urine cultures  C/W Maintenance IV Fluids  Hold Lisinopril and Coreg

## 2017-04-03 NOTE — ED ADULT NURSE NOTE - OBJECTIVE STATEMENT
62 y/o F to ED by EMS accompanied by friend c/o abd distension x3 weeks, worsening x3 days.  A&OX4.  +dizziness.  Pt states witnessed syncope by friend earlier today.  No head trauma.  No SOB.  Lungs clear and equal to auscultation.  Moves all extremities, pt states too weak to stand.  Strength equal bilat extremities.  Pt denies chest pain, numbness or tingling.  Abd soft, distended, tender diffuse.  Pt states she had similar pain recently with constipation, slightly resolved after hospital admission with multiple laxatives.  Pt takes opioids for chronic back pain.  Med lock 18 L AC placed, labs drawn.  Skin warm dry and intact.  NAD. Safety maintained.  Will continue to monitor.

## 2017-04-03 NOTE — H&P ADULT. - HISTORY OF PRESENT ILLNESS
60 yo woman with PMH of  HTN, HLD, COPD (not on home O2), liver cirrhosis 2/2 AMARO, hypothyroid, anxiety/depression (documented), Lumbar Laminectomy with disk herniation, TIAs, brought in from home in setting of near syncope. Patient states that she was feeling worsening chronic lower back pain radiating down to the lower legs and it worsened to the point where she felt weak and fainted onto the ground. Patient denies hitting her head or incurring any other injuries. Patient states she never lost consciousness. Endorses occasional dizziness. Denies CP, palpitations. Patient is unsure if it was a "vasovagal" episode which has happened in the past. Patient check her pressure at home noted to be 80s/40s and decided to come to the ED. Patient does not recall incurring any injury to the back. Patient has been compliant with medications Oxycodone 3-4 days, Neurontin, and Lyrica as prescribed. Patient state that she feels a pain from lower left back extending down to the toes, and lower right back extending to the knee. She has chronic neuropathy. Denies new weakness of the legs. Patient states she was recently treated for UTI and endorses dysuria. Denies fevers, chills, sweats. Cough is present with yellow phelgm. Cough would be extensive in the AM and make her feel she needs to vomit. Denies nausea. 60 yo woman with PMH of  HTN, HLD, COPD (not on home O2), liver cirrhosis 2/2 AMARO, hypothyroid, anxiety/depression (documented), Lumbar Laminectomy with disk herniation, TIAs, brought in from home in setting of near syncope. Patient states that she was feeling worsening chronic lower back pain radiating down to the lower legs and it worsened to the point where she felt weak and fainted onto the ground. Patient was helped up by her friend.  Patient denies hitting her head or incurring any other injuries. Patient states she never lost consciousness. Endorses occasional dizziness. Denies CP, palpitations. Patient is unsure if it was a "vasovagal" episode which has happened in the past. Patient check her pressure at home noted to be 80s/40s and decided to come to the ED. Patient does not recall incurring any injury to the back.  She endorses being able to complete food shopping with a friend earlier in the day: states pain was bothersome. Patient has been compliant with medications Oxycodone 3-4 times a day, Neurontin, and Lyrica as prescribed. Patient state that she feels a pain from lower left back extending down to the toes, and lower right back extending to the knee. She has chronic neuropathy. Denies new weakness of the legs. Patient states she was recently treated for UTI and endorses dysuria. Denies fevers, chills, sweats. Cough is present with yellow phelgm. Cough would be extensive in the AM and make her feel she needs to vomit. Denies nausea.

## 2017-04-03 NOTE — H&P ADULT. - RS GEN PE MLT RESP DETAILS PC
breath sounds equal/no rhonchi/no rales/good air movement/respirations non-labored/wheezes/airway patent

## 2017-04-04 DIAGNOSIS — J44.9 CHRONIC OBSTRUCTIVE PULMONARY DISEASE, UNSPECIFIED: ICD-10-CM

## 2017-04-04 DIAGNOSIS — K74.60 UNSPECIFIED CIRRHOSIS OF LIVER: ICD-10-CM

## 2017-04-04 DIAGNOSIS — M54.40 LUMBAGO WITH SCIATICA, UNSPECIFIED SIDE: ICD-10-CM

## 2017-04-04 DIAGNOSIS — R55 SYNCOPE AND COLLAPSE: ICD-10-CM

## 2017-04-04 DIAGNOSIS — E11.8 TYPE 2 DIABETES MELLITUS WITH UNSPECIFIED COMPLICATIONS: ICD-10-CM

## 2017-04-04 DIAGNOSIS — Z29.9 ENCOUNTER FOR PROPHYLACTIC MEASURES, UNSPECIFIED: ICD-10-CM

## 2017-04-04 DIAGNOSIS — I95.9 HYPOTENSION, UNSPECIFIED: ICD-10-CM

## 2017-04-04 LAB
ANION GAP SERPL CALC-SCNC: 12 MMOL/L — SIGNIFICANT CHANGE UP (ref 5–17)
BASOPHILS # BLD AUTO: 0.02 K/UL — SIGNIFICANT CHANGE UP (ref 0–0.2)
BASOPHILS NFR BLD AUTO: 0.6 % — SIGNIFICANT CHANGE UP (ref 0–2)
BUN SERPL-MCNC: 11 MG/DL — SIGNIFICANT CHANGE UP (ref 7–23)
CALCIUM SERPL-MCNC: 9 MG/DL — SIGNIFICANT CHANGE UP (ref 8.4–10.5)
CHLORIDE SERPL-SCNC: 101 MMOL/L — SIGNIFICANT CHANGE UP (ref 96–108)
CK MB BLD-MCNC: 3 % — SIGNIFICANT CHANGE UP (ref 0–3.5)
CK MB CFR SERPL CALC: 2.3 NG/ML — SIGNIFICANT CHANGE UP (ref 0–3.8)
CK SERPL-CCNC: 76 U/L — SIGNIFICANT CHANGE UP (ref 25–170)
CO2 SERPL-SCNC: 23 MMOL/L — SIGNIFICANT CHANGE UP (ref 22–31)
CREAT SERPL-MCNC: 0.95 MG/DL — SIGNIFICANT CHANGE UP (ref 0.5–1.3)
CULTURE RESULTS: SIGNIFICANT CHANGE UP
EOSINOPHIL # BLD AUTO: 0.16 K/UL — SIGNIFICANT CHANGE UP (ref 0–0.5)
EOSINOPHIL NFR BLD AUTO: 5.2 % — SIGNIFICANT CHANGE UP (ref 0–6)
GLUCOSE SERPL-MCNC: 195 MG/DL — HIGH (ref 70–99)
HCT VFR BLD CALC: 30.5 % — LOW (ref 34.5–45)
HGB BLD-MCNC: 9.7 G/DL — LOW (ref 11.5–15.5)
IMM GRANULOCYTES NFR BLD AUTO: 0.3 % — SIGNIFICANT CHANGE UP (ref 0–1.5)
LYMPHOCYTES # BLD AUTO: 1.02 K/UL — SIGNIFICANT CHANGE UP (ref 1–3.3)
LYMPHOCYTES # BLD AUTO: 33 % — SIGNIFICANT CHANGE UP (ref 13–44)
MAGNESIUM SERPL-MCNC: 1.7 MG/DL — SIGNIFICANT CHANGE UP (ref 1.6–2.6)
MCHC RBC-ENTMCNC: 27.5 PG — SIGNIFICANT CHANGE UP (ref 27–34)
MCHC RBC-ENTMCNC: 31.8 GM/DL — LOW (ref 32–36)
MCV RBC AUTO: 86.4 FL — SIGNIFICANT CHANGE UP (ref 80–100)
MONOCYTES # BLD AUTO: 0.31 K/UL — SIGNIFICANT CHANGE UP (ref 0–0.9)
MONOCYTES NFR BLD AUTO: 10 % — SIGNIFICANT CHANGE UP (ref 2–14)
NEUTROPHILS # BLD AUTO: 1.57 K/UL — LOW (ref 1.8–7.4)
NEUTROPHILS NFR BLD AUTO: 50.9 % — SIGNIFICANT CHANGE UP (ref 43–77)
PHOSPHATE SERPL-MCNC: 3.9 MG/DL — SIGNIFICANT CHANGE UP (ref 2.5–4.5)
PLATELET # BLD AUTO: 100 K/UL — LOW (ref 150–400)
POTASSIUM SERPL-MCNC: 4.1 MMOL/L — SIGNIFICANT CHANGE UP (ref 3.5–5.3)
POTASSIUM SERPL-SCNC: 4.1 MMOL/L — SIGNIFICANT CHANGE UP (ref 3.5–5.3)
RBC # BLD: 3.53 M/UL — LOW (ref 3.8–5.2)
RBC # FLD: 16.2 % — HIGH (ref 10.3–14.5)
SODIUM SERPL-SCNC: 136 MMOL/L — SIGNIFICANT CHANGE UP (ref 135–145)
SPECIMEN SOURCE: SIGNIFICANT CHANGE UP
TROPONIN T SERPL-MCNC: <0.01 NG/ML — SIGNIFICANT CHANGE UP (ref 0–0.06)
TSH SERPL-MCNC: 3.39 UIU/ML — SIGNIFICANT CHANGE UP (ref 0.27–4.2)
WBC # BLD: 3.09 K/UL — LOW (ref 3.8–10.5)
WBC # FLD AUTO: 3.09 K/UL — LOW (ref 3.8–10.5)

## 2017-04-04 RX ORDER — DEXAMETHASONE 0.5 MG/5ML
4 ELIXIR ORAL EVERY 12 HOURS
Qty: 0 | Refills: 0 | Status: DISCONTINUED | OUTPATIENT
Start: 2017-04-05 | End: 2017-04-05

## 2017-04-04 RX ORDER — DEXAMETHASONE 0.5 MG/5ML
10 ELIXIR ORAL ONCE
Qty: 0 | Refills: 0 | Status: COMPLETED | OUTPATIENT
Start: 2017-04-04 | End: 2017-04-04

## 2017-04-04 RX ORDER — DIPHENHYDRAMINE HCL 50 MG
25 CAPSULE ORAL ONCE
Qty: 0 | Refills: 0 | Status: COMPLETED | OUTPATIENT
Start: 2017-04-04 | End: 2017-04-04

## 2017-04-04 RX ORDER — SODIUM CHLORIDE 9 MG/ML
1000 INJECTION INTRAMUSCULAR; INTRAVENOUS; SUBCUTANEOUS
Qty: 0 | Refills: 0 | Status: COMPLETED | OUTPATIENT
Start: 2017-04-04 | End: 2017-04-04

## 2017-04-04 RX ORDER — LEVALBUTEROL 1.25 MG/.5ML
0.63 SOLUTION, CONCENTRATE RESPIRATORY (INHALATION) EVERY 6 HOURS
Qty: 0 | Refills: 0 | Status: DISCONTINUED | OUTPATIENT
Start: 2017-04-04 | End: 2017-04-08

## 2017-04-04 RX ORDER — ALBUTEROL 90 UG/1
2.5 AEROSOL, METERED ORAL ONCE
Qty: 0 | Refills: 0 | Status: COMPLETED | OUTPATIENT
Start: 2017-04-04 | End: 2017-04-04

## 2017-04-04 RX ADMIN — HEPARIN SODIUM 5000 UNIT(S): 5000 INJECTION INTRAVENOUS; SUBCUTANEOUS at 13:02

## 2017-04-04 RX ADMIN — Medication 25 MICROGRAM(S): at 06:28

## 2017-04-04 RX ADMIN — LACTULOSE 10 GRAM(S): 10 SOLUTION ORAL at 06:28

## 2017-04-04 RX ADMIN — LACTULOSE 10 GRAM(S): 10 SOLUTION ORAL at 13:01

## 2017-04-04 RX ADMIN — MORPHINE SULFATE 4 MILLIGRAM(S): 50 CAPSULE, EXTENDED RELEASE ORAL at 01:13

## 2017-04-04 RX ADMIN — OXYCODONE HYDROCHLORIDE 15 MILLIGRAM(S): 5 TABLET ORAL at 07:03

## 2017-04-04 RX ADMIN — Medication 25 MILLIGRAM(S): at 12:09

## 2017-04-04 RX ADMIN — MORPHINE SULFATE 4 MILLIGRAM(S): 50 CAPSULE, EXTENDED RELEASE ORAL at 01:30

## 2017-04-04 RX ADMIN — TIOTROPIUM BROMIDE 1 CAPSULE(S): 18 CAPSULE ORAL; RESPIRATORY (INHALATION) at 11:17

## 2017-04-04 RX ADMIN — Medication 1: at 22:32

## 2017-04-04 RX ADMIN — SODIUM CHLORIDE 75 MILLILITER(S): 9 INJECTION INTRAMUSCULAR; INTRAVENOUS; SUBCUTANEOUS at 04:31

## 2017-04-04 RX ADMIN — Medication 1 GRAM(S): at 22:31

## 2017-04-04 RX ADMIN — GABAPENTIN 300 MILLIGRAM(S): 400 CAPSULE ORAL at 22:31

## 2017-04-04 RX ADMIN — Medication 4: at 01:34

## 2017-04-04 RX ADMIN — OXYCODONE HYDROCHLORIDE 15 MILLIGRAM(S): 5 TABLET ORAL at 15:10

## 2017-04-04 RX ADMIN — CYCLOBENZAPRINE HYDROCHLORIDE 10 MILLIGRAM(S): 10 TABLET, FILM COATED ORAL at 06:28

## 2017-04-04 RX ADMIN — HEPARIN SODIUM 5000 UNIT(S): 5000 INJECTION INTRAVENOUS; SUBCUTANEOUS at 22:32

## 2017-04-04 RX ADMIN — Medication 1 GRAM(S): at 18:20

## 2017-04-04 RX ADMIN — MIRTAZAPINE 45 MILLIGRAM(S): 45 TABLET, ORALLY DISINTEGRATING ORAL at 22:31

## 2017-04-04 RX ADMIN — GABAPENTIN 300 MILLIGRAM(S): 400 CAPSULE ORAL at 13:02

## 2017-04-04 RX ADMIN — MORPHINE SULFATE 4 MILLIGRAM(S): 50 CAPSULE, EXTENDED RELEASE ORAL at 17:55

## 2017-04-04 RX ADMIN — Medication 300 MILLIGRAM(S): at 22:32

## 2017-04-04 RX ADMIN — Medication: at 19:09

## 2017-04-04 RX ADMIN — MORPHINE SULFATE 4 MILLIGRAM(S): 50 CAPSULE, EXTENDED RELEASE ORAL at 09:31

## 2017-04-04 RX ADMIN — SENNA PLUS 2 TABLET(S): 8.6 TABLET ORAL at 22:31

## 2017-04-04 RX ADMIN — Medication 1.5 MILLIGRAM(S): at 22:31

## 2017-04-04 RX ADMIN — MORPHINE SULFATE 4 MILLIGRAM(S): 50 CAPSULE, EXTENDED RELEASE ORAL at 09:11

## 2017-04-04 RX ADMIN — DULOXETINE HYDROCHLORIDE 60 MILLIGRAM(S): 30 CAPSULE, DELAYED RELEASE ORAL at 06:28

## 2017-04-04 RX ADMIN — MORPHINE SULFATE 4 MILLIGRAM(S): 50 CAPSULE, EXTENDED RELEASE ORAL at 23:40

## 2017-04-04 RX ADMIN — Medication 2: at 12:20

## 2017-04-04 RX ADMIN — DULOXETINE HYDROCHLORIDE 60 MILLIGRAM(S): 30 CAPSULE, DELAYED RELEASE ORAL at 18:20

## 2017-04-04 RX ADMIN — OXYCODONE HYDROCHLORIDE 15 MILLIGRAM(S): 5 TABLET ORAL at 14:40

## 2017-04-04 RX ADMIN — GABAPENTIN 300 MILLIGRAM(S): 400 CAPSULE ORAL at 06:31

## 2017-04-04 RX ADMIN — MORPHINE SULFATE 4 MILLIGRAM(S): 50 CAPSULE, EXTENDED RELEASE ORAL at 17:40

## 2017-04-04 RX ADMIN — Medication 10 MILLIGRAM(S): at 18:21

## 2017-04-04 RX ADMIN — Medication 1 GRAM(S): at 11:16

## 2017-04-04 RX ADMIN — SIMETHICONE 80 MILLIGRAM(S): 80 TABLET, CHEWABLE ORAL at 06:28

## 2017-04-04 RX ADMIN — LACTULOSE 10 GRAM(S): 10 SOLUTION ORAL at 22:32

## 2017-04-04 RX ADMIN — OXYCODONE HYDROCHLORIDE 15 MILLIGRAM(S): 5 TABLET ORAL at 06:27

## 2017-04-04 RX ADMIN — Medication 1 GRAM(S): at 06:27

## 2017-04-04 RX ADMIN — ALBUTEROL 2.5 MILLIGRAM(S): 90 AEROSOL, METERED ORAL at 16:00

## 2017-04-04 RX ADMIN — HEPARIN SODIUM 5000 UNIT(S): 5000 INJECTION INTRAVENOUS; SUBCUTANEOUS at 06:29

## 2017-04-05 LAB
ANION GAP SERPL CALC-SCNC: 9 MMOL/L — SIGNIFICANT CHANGE UP (ref 5–17)
BUN SERPL-MCNC: 16 MG/DL — SIGNIFICANT CHANGE UP (ref 7–23)
CALCIUM SERPL-MCNC: 9 MG/DL — SIGNIFICANT CHANGE UP (ref 8.4–10.5)
CHLORIDE SERPL-SCNC: 103 MMOL/L — SIGNIFICANT CHANGE UP (ref 96–108)
CO2 SERPL-SCNC: 23 MMOL/L — SIGNIFICANT CHANGE UP (ref 22–31)
CREAT SERPL-MCNC: 0.83 MG/DL — SIGNIFICANT CHANGE UP (ref 0.5–1.3)
GLUCOSE SERPL-MCNC: 341 MG/DL — HIGH (ref 70–99)
HCT VFR BLD CALC: 30.3 % — LOW (ref 34.5–45)
HGB BLD-MCNC: 10.3 G/DL — LOW (ref 11.5–15.5)
MCHC RBC-ENTMCNC: 29.5 PG — SIGNIFICANT CHANGE UP (ref 27–34)
MCHC RBC-ENTMCNC: 34 GM/DL — SIGNIFICANT CHANGE UP (ref 32–36)
MCV RBC AUTO: 86.6 FL — SIGNIFICANT CHANGE UP (ref 80–100)
PLATELET # BLD AUTO: 76 K/UL — LOW (ref 150–400)
POTASSIUM SERPL-MCNC: 5.1 MMOL/L — SIGNIFICANT CHANGE UP (ref 3.5–5.3)
POTASSIUM SERPL-SCNC: 5.1 MMOL/L — SIGNIFICANT CHANGE UP (ref 3.5–5.3)
RBC # BLD: 3.49 M/UL — LOW (ref 3.8–5.2)
RBC # FLD: 14.6 % — HIGH (ref 10.3–14.5)
SODIUM SERPL-SCNC: 135 MMOL/L — SIGNIFICANT CHANGE UP (ref 135–145)
WBC # BLD: 3.1 K/UL — LOW (ref 3.8–10.5)
WBC # FLD AUTO: 3.1 K/UL — LOW (ref 3.8–10.5)

## 2017-04-05 PROCEDURE — 93306 TTE W/DOPPLER COMPLETE: CPT | Mod: 26

## 2017-04-05 RX ORDER — INSULIN LISPRO 100/ML
2 VIAL (ML) SUBCUTANEOUS
Qty: 0 | Refills: 0 | Status: DISCONTINUED | OUTPATIENT
Start: 2017-04-05 | End: 2017-04-06

## 2017-04-05 RX ORDER — DIPHENHYDRAMINE HCL 50 MG
50 CAPSULE ORAL ONCE
Qty: 0 | Refills: 0 | Status: COMPLETED | OUTPATIENT
Start: 2017-04-05 | End: 2017-04-05

## 2017-04-05 RX ORDER — MORPHINE SULFATE 50 MG/1
4 CAPSULE, EXTENDED RELEASE ORAL ONCE
Qty: 0 | Refills: 0 | Status: DISCONTINUED | OUTPATIENT
Start: 2017-04-05 | End: 2017-04-05

## 2017-04-05 RX ORDER — INSULIN LISPRO 100/ML
6 VIAL (ML) SUBCUTANEOUS ONCE
Qty: 0 | Refills: 0 | Status: COMPLETED | OUTPATIENT
Start: 2017-04-05 | End: 2017-04-05

## 2017-04-05 RX ORDER — INSULIN GLARGINE 100 [IU]/ML
15 INJECTION, SOLUTION SUBCUTANEOUS AT BEDTIME
Qty: 0 | Refills: 0 | Status: DISCONTINUED | OUTPATIENT
Start: 2017-04-05 | End: 2017-04-08

## 2017-04-05 RX ORDER — CLONAZEPAM 1 MG
0.5 TABLET ORAL ONCE
Qty: 0 | Refills: 0 | Status: DISCONTINUED | OUTPATIENT
Start: 2017-04-05 | End: 2017-04-05

## 2017-04-05 RX ADMIN — Medication 0.5 MILLIGRAM(S): at 19:39

## 2017-04-05 RX ADMIN — Medication 4 MILLIGRAM(S): at 07:08

## 2017-04-05 RX ADMIN — LACTULOSE 10 GRAM(S): 10 SOLUTION ORAL at 21:06

## 2017-04-05 RX ADMIN — HEPARIN SODIUM 5000 UNIT(S): 5000 INJECTION INTRAVENOUS; SUBCUTANEOUS at 07:08

## 2017-04-05 RX ADMIN — Medication 5: at 13:31

## 2017-04-05 RX ADMIN — MORPHINE SULFATE 4 MILLIGRAM(S): 50 CAPSULE, EXTENDED RELEASE ORAL at 21:17

## 2017-04-05 RX ADMIN — MORPHINE SULFATE 4 MILLIGRAM(S): 50 CAPSULE, EXTENDED RELEASE ORAL at 10:56

## 2017-04-05 RX ADMIN — Medication 1 GRAM(S): at 17:34

## 2017-04-05 RX ADMIN — OXYCODONE HYDROCHLORIDE 15 MILLIGRAM(S): 5 TABLET ORAL at 01:50

## 2017-04-05 RX ADMIN — MIRTAZAPINE 45 MILLIGRAM(S): 45 TABLET, ORALLY DISINTEGRATING ORAL at 23:02

## 2017-04-05 RX ADMIN — GABAPENTIN 300 MILLIGRAM(S): 400 CAPSULE ORAL at 07:08

## 2017-04-05 RX ADMIN — GABAPENTIN 300 MILLIGRAM(S): 400 CAPSULE ORAL at 13:11

## 2017-04-05 RX ADMIN — OXYCODONE HYDROCHLORIDE 15 MILLIGRAM(S): 5 TABLET ORAL at 11:53

## 2017-04-05 RX ADMIN — LEVALBUTEROL 0.63 MILLIGRAM(S): 1.25 SOLUTION, CONCENTRATE RESPIRATORY (INHALATION) at 10:58

## 2017-04-05 RX ADMIN — MORPHINE SULFATE 4 MILLIGRAM(S): 50 CAPSULE, EXTENDED RELEASE ORAL at 13:45

## 2017-04-05 RX ADMIN — TIOTROPIUM BROMIDE 1 CAPSULE(S): 18 CAPSULE ORAL; RESPIRATORY (INHALATION) at 13:38

## 2017-04-05 RX ADMIN — DULOXETINE HYDROCHLORIDE 60 MILLIGRAM(S): 30 CAPSULE, DELAYED RELEASE ORAL at 07:08

## 2017-04-05 RX ADMIN — CYCLOBENZAPRINE HYDROCHLORIDE 10 MILLIGRAM(S): 10 TABLET, FILM COATED ORAL at 01:50

## 2017-04-05 RX ADMIN — GABAPENTIN 300 MILLIGRAM(S): 400 CAPSULE ORAL at 21:07

## 2017-04-05 RX ADMIN — MORPHINE SULFATE 4 MILLIGRAM(S): 50 CAPSULE, EXTENDED RELEASE ORAL at 21:02

## 2017-04-05 RX ADMIN — Medication 5: at 17:59

## 2017-04-05 RX ADMIN — DULOXETINE HYDROCHLORIDE 60 MILLIGRAM(S): 30 CAPSULE, DELAYED RELEASE ORAL at 17:34

## 2017-04-05 RX ADMIN — HEPARIN SODIUM 5000 UNIT(S): 5000 INJECTION INTRAVENOUS; SUBCUTANEOUS at 21:07

## 2017-04-05 RX ADMIN — Medication 1 GRAM(S): at 13:11

## 2017-04-05 RX ADMIN — Medication 50 MILLIGRAM(S): at 17:58

## 2017-04-05 RX ADMIN — Medication 6 UNIT(S): at 21:06

## 2017-04-05 RX ADMIN — LACTULOSE 10 GRAM(S): 10 SOLUTION ORAL at 14:53

## 2017-04-05 RX ADMIN — SENNA PLUS 2 TABLET(S): 8.6 TABLET ORAL at 21:06

## 2017-04-05 RX ADMIN — INSULIN GLARGINE 15 UNIT(S): 100 INJECTION, SOLUTION SUBCUTANEOUS at 23:42

## 2017-04-05 RX ADMIN — Medication 300 MILLIGRAM(S): at 23:02

## 2017-04-05 RX ADMIN — MORPHINE SULFATE 4 MILLIGRAM(S): 50 CAPSULE, EXTENDED RELEASE ORAL at 14:05

## 2017-04-05 RX ADMIN — Medication 3: at 09:36

## 2017-04-05 RX ADMIN — MORPHINE SULFATE 4 MILLIGRAM(S): 50 CAPSULE, EXTENDED RELEASE ORAL at 11:05

## 2017-04-05 RX ADMIN — Medication 1.5 MILLIGRAM(S): at 23:02

## 2017-04-05 RX ADMIN — OXYCODONE HYDROCHLORIDE 15 MILLIGRAM(S): 5 TABLET ORAL at 12:45

## 2017-04-05 RX ADMIN — HEPARIN SODIUM 5000 UNIT(S): 5000 INJECTION INTRAVENOUS; SUBCUTANEOUS at 14:55

## 2017-04-05 RX ADMIN — LACTULOSE 10 GRAM(S): 10 SOLUTION ORAL at 07:09

## 2017-04-05 RX ADMIN — OXYCODONE HYDROCHLORIDE 15 MILLIGRAM(S): 5 TABLET ORAL at 23:02

## 2017-04-05 RX ADMIN — OXYCODONE HYDROCHLORIDE 15 MILLIGRAM(S): 5 TABLET ORAL at 02:27

## 2017-04-05 RX ADMIN — Medication 1 GRAM(S): at 07:08

## 2017-04-05 RX ADMIN — Medication 1 GRAM(S): at 21:06

## 2017-04-05 RX ADMIN — Medication 25 MICROGRAM(S): at 07:09

## 2017-04-05 RX ADMIN — MORPHINE SULFATE 4 MILLIGRAM(S): 50 CAPSULE, EXTENDED RELEASE ORAL at 00:01

## 2017-04-05 RX ADMIN — Medication 4: at 21:02

## 2017-04-05 NOTE — PROVIDER CONTACT NOTE (OTHER) - BACKGROUND
DX:near syncope  PMH: TIA, syncope and collapse, anxiety and depression, thrombocytopenia, diabetes mellitus, COPD, constipation, anemia, GERD, lung nodules, cirrhosis, fatty liver

## 2017-04-05 NOTE — DIETITIAN INITIAL EVALUATION ADULT. - OTHER INFO
Patient referred for A1c 7.9.  Known to this RD from March admit.  Patient now here for syncope and collapse.  Patient admitted that she was not caring for diet as well as in the past due to passing of her mother.  Recently back on track in last week or so.  Breaklfast was cream of wheat, HB egg and coffee.  Lunch was large salad with Hard cooked egg, fruit.  Dinner included grilled chicken, sweet potato and salad.  Watching sodium intake.  Supplementing with B complex, Vit D3 and occuvite.  Allergic to fish. Highest weight noted at 178 pounds March 23rd. Weight now noted at 183 pounds?  Severe constipation resolved. Taking lactulose, senna at home. Usual low weight 154 pounds.   Currently on decadron with glucose noted at 341 this morning.

## 2017-04-05 NOTE — DIETITIAN INITIAL EVALUATION ADULT. - PROBLEM SELECTOR PLAN 2
Per patient does not endorse over use of pain medications nor other meds. No clear history of drug induced per patient but concern for polypharmacy.  No clear infectious etiology at this time with objective data:  RVP negative  UA not suggestive of UTI  CXR not suggestive of Pneumonia  F/U Blood cultures and urine cultures  C/W Maintenance IV Fluids  Hold Lisinopril and Coreg

## 2017-04-05 NOTE — DIETITIAN INITIAL EVALUATION ADULT. - NS AS NUTRI INTERV ED CONTENT
Diabetic meal planning reinforced. discussed need for protein balanced with CHO, reinforced low salt foods./Recommended modifications

## 2017-04-05 NOTE — DIETITIAN INITIAL EVALUATION ADULT. - ENERGY NEEDS
HT 61 inches,  pounds, low weight 154 pounds,  pounds, BMI 34.5, Unsure if weight gain related to fluid shifts, constipation resolved.  Dxd with syncope, collapse, cirrhosis, AMARO  Skin intact. Appetite and intake very good.

## 2017-04-05 NOTE — PROVIDER CONTACT NOTE (OTHER) - ACTION/TREATMENT ORDERED:
4 units bedtime scale and 6 units additional Humalog, 10 units of Humalog total, repeat FS in 1 hour

## 2017-04-06 LAB
ANION GAP SERPL CALC-SCNC: 11 MMOL/L — SIGNIFICANT CHANGE UP (ref 5–17)
BUN SERPL-MCNC: 14 MG/DL — SIGNIFICANT CHANGE UP (ref 7–23)
CALCIUM SERPL-MCNC: 9 MG/DL — SIGNIFICANT CHANGE UP (ref 8.4–10.5)
CHLORIDE SERPL-SCNC: 103 MMOL/L — SIGNIFICANT CHANGE UP (ref 96–108)
CK MB BLD-MCNC: 4.7 % — HIGH (ref 0–3.5)
CK MB CFR SERPL CALC: 3.6 NG/ML — SIGNIFICANT CHANGE UP (ref 0–3.8)
CK SERPL-CCNC: 76 U/L — SIGNIFICANT CHANGE UP (ref 25–170)
CO2 SERPL-SCNC: 25 MMOL/L — SIGNIFICANT CHANGE UP (ref 22–31)
CREAT SERPL-MCNC: 0.84 MG/DL — SIGNIFICANT CHANGE UP (ref 0.5–1.3)
GLUCOSE SERPL-MCNC: 391 MG/DL — HIGH (ref 70–99)
HCT VFR BLD CALC: 28.4 % — LOW (ref 34.5–45)
HGB BLD-MCNC: 9.5 G/DL — LOW (ref 11.5–15.5)
MCHC RBC-ENTMCNC: 29.2 PG — SIGNIFICANT CHANGE UP (ref 27–34)
MCHC RBC-ENTMCNC: 33.4 GM/DL — SIGNIFICANT CHANGE UP (ref 32–36)
MCV RBC AUTO: 87.4 FL — SIGNIFICANT CHANGE UP (ref 80–100)
PLATELET # BLD AUTO: 65 K/UL — LOW (ref 150–400)
POTASSIUM SERPL-MCNC: 4.4 MMOL/L — SIGNIFICANT CHANGE UP (ref 3.5–5.3)
POTASSIUM SERPL-SCNC: 4.4 MMOL/L — SIGNIFICANT CHANGE UP (ref 3.5–5.3)
RBC # BLD: 3.25 M/UL — LOW (ref 3.8–5.2)
RBC # FLD: 15.1 % — HIGH (ref 10.3–14.5)
SODIUM SERPL-SCNC: 139 MMOL/L — SIGNIFICANT CHANGE UP (ref 135–145)
TROPONIN T SERPL-MCNC: <0.01 NG/ML — SIGNIFICANT CHANGE UP (ref 0–0.06)
WBC # BLD: 3 K/UL — LOW (ref 3.8–10.5)
WBC # FLD AUTO: 3 K/UL — LOW (ref 3.8–10.5)

## 2017-04-06 PROCEDURE — 99232 SBSQ HOSP IP/OBS MODERATE 35: CPT | Mod: GC

## 2017-04-06 RX ORDER — DIPHENHYDRAMINE HCL 50 MG
25 CAPSULE ORAL ONCE
Qty: 0 | Refills: 0 | Status: COMPLETED | OUTPATIENT
Start: 2017-04-06 | End: 2017-04-06

## 2017-04-06 RX ORDER — INSULIN LISPRO 100/ML
6 VIAL (ML) SUBCUTANEOUS
Qty: 0 | Refills: 0 | Status: DISCONTINUED | OUTPATIENT
Start: 2017-04-06 | End: 2017-04-07

## 2017-04-06 RX ADMIN — MIRTAZAPINE 45 MILLIGRAM(S): 45 TABLET, ORALLY DISINTEGRATING ORAL at 21:04

## 2017-04-06 RX ADMIN — Medication 25 MICROGRAM(S): at 05:49

## 2017-04-06 RX ADMIN — Medication 600 MILLIGRAM(S): at 16:25

## 2017-04-06 RX ADMIN — Medication 6 UNIT(S): at 18:18

## 2017-04-06 RX ADMIN — MORPHINE SULFATE 4 MILLIGRAM(S): 50 CAPSULE, EXTENDED RELEASE ORAL at 10:12

## 2017-04-06 RX ADMIN — MORPHINE SULFATE 4 MILLIGRAM(S): 50 CAPSULE, EXTENDED RELEASE ORAL at 21:10

## 2017-04-06 RX ADMIN — OXYCODONE HYDROCHLORIDE 15 MILLIGRAM(S): 5 TABLET ORAL at 18:18

## 2017-04-06 RX ADMIN — Medication 300 MILLIGRAM(S): at 21:05

## 2017-04-06 RX ADMIN — TIOTROPIUM BROMIDE 1 CAPSULE(S): 18 CAPSULE ORAL; RESPIRATORY (INHALATION) at 11:11

## 2017-04-06 RX ADMIN — SENNA PLUS 2 TABLET(S): 8.6 TABLET ORAL at 21:06

## 2017-04-06 RX ADMIN — MORPHINE SULFATE 4 MILLIGRAM(S): 50 CAPSULE, EXTENDED RELEASE ORAL at 21:23

## 2017-04-06 RX ADMIN — Medication 2 UNIT(S): at 09:09

## 2017-04-06 RX ADMIN — OXYCODONE HYDROCHLORIDE 15 MILLIGRAM(S): 5 TABLET ORAL at 11:09

## 2017-04-06 RX ADMIN — MORPHINE SULFATE 4 MILLIGRAM(S): 50 CAPSULE, EXTENDED RELEASE ORAL at 03:31

## 2017-04-06 RX ADMIN — LACTULOSE 10 GRAM(S): 10 SOLUTION ORAL at 13:33

## 2017-04-06 RX ADMIN — GABAPENTIN 300 MILLIGRAM(S): 400 CAPSULE ORAL at 05:49

## 2017-04-06 RX ADMIN — OXYCODONE HYDROCHLORIDE 15 MILLIGRAM(S): 5 TABLET ORAL at 13:26

## 2017-04-06 RX ADMIN — Medication 1.5 MILLIGRAM(S): at 22:25

## 2017-04-06 RX ADMIN — Medication 3: at 21:07

## 2017-04-06 RX ADMIN — Medication 1 GRAM(S): at 21:06

## 2017-04-06 RX ADMIN — INSULIN GLARGINE 15 UNIT(S): 100 INJECTION, SOLUTION SUBCUTANEOUS at 21:22

## 2017-04-06 RX ADMIN — LACTULOSE 10 GRAM(S): 10 SOLUTION ORAL at 21:02

## 2017-04-06 RX ADMIN — Medication 3: at 09:08

## 2017-04-06 RX ADMIN — Medication 5: at 13:28

## 2017-04-06 RX ADMIN — Medication: at 18:18

## 2017-04-06 RX ADMIN — LACTULOSE 10 GRAM(S): 10 SOLUTION ORAL at 05:49

## 2017-04-06 RX ADMIN — GABAPENTIN 300 MILLIGRAM(S): 400 CAPSULE ORAL at 21:03

## 2017-04-06 RX ADMIN — OXYCODONE HYDROCHLORIDE 15 MILLIGRAM(S): 5 TABLET ORAL at 00:02

## 2017-04-06 RX ADMIN — Medication 1 GRAM(S): at 18:13

## 2017-04-06 RX ADMIN — Medication 1 GRAM(S): at 09:09

## 2017-04-06 RX ADMIN — GABAPENTIN 300 MILLIGRAM(S): 400 CAPSULE ORAL at 13:32

## 2017-04-06 RX ADMIN — OXYCODONE HYDROCHLORIDE 15 MILLIGRAM(S): 5 TABLET ORAL at 17:07

## 2017-04-06 RX ADMIN — DULOXETINE HYDROCHLORIDE 60 MILLIGRAM(S): 30 CAPSULE, DELAYED RELEASE ORAL at 18:12

## 2017-04-06 RX ADMIN — DULOXETINE HYDROCHLORIDE 60 MILLIGRAM(S): 30 CAPSULE, DELAYED RELEASE ORAL at 05:49

## 2017-04-06 RX ADMIN — Medication 1 GRAM(S): at 13:33

## 2017-04-06 RX ADMIN — Medication 2 UNIT(S): at 13:29

## 2017-04-06 RX ADMIN — HEPARIN SODIUM 5000 UNIT(S): 5000 INJECTION INTRAVENOUS; SUBCUTANEOUS at 05:49

## 2017-04-06 RX ADMIN — MORPHINE SULFATE 4 MILLIGRAM(S): 50 CAPSULE, EXTENDED RELEASE ORAL at 03:16

## 2017-04-06 RX ADMIN — MORPHINE SULFATE 4 MILLIGRAM(S): 50 CAPSULE, EXTENDED RELEASE ORAL at 09:19

## 2017-04-06 RX ADMIN — Medication 25 MILLIGRAM(S): at 18:17

## 2017-04-07 ENCOUNTER — TRANSCRIPTION ENCOUNTER (OUTPATIENT)
Age: 62
End: 2017-04-07

## 2017-04-07 LAB
HCT VFR BLD CALC: 30.2 % — LOW (ref 34.5–45)
HGB BLD-MCNC: 10.1 G/DL — LOW (ref 11.5–15.5)
MCHC RBC-ENTMCNC: 29.4 PG — SIGNIFICANT CHANGE UP (ref 27–34)
MCHC RBC-ENTMCNC: 33.6 GM/DL — SIGNIFICANT CHANGE UP (ref 32–36)
MCV RBC AUTO: 87.4 FL — SIGNIFICANT CHANGE UP (ref 80–100)
PLATELET # BLD AUTO: 70 K/UL — LOW (ref 150–400)
RBC # BLD: 3.46 M/UL — LOW (ref 3.8–5.2)
RBC # FLD: 14.8 % — HIGH (ref 10.3–14.5)
WBC # BLD: 2.5 K/UL — LOW (ref 3.8–10.5)
WBC # FLD AUTO: 2.5 K/UL — LOW (ref 3.8–10.5)

## 2017-04-07 PROCEDURE — 99232 SBSQ HOSP IP/OBS MODERATE 35: CPT | Mod: GC

## 2017-04-07 RX ORDER — MORPHINE SULFATE 50 MG/1
4 CAPSULE, EXTENDED RELEASE ORAL ONCE
Qty: 0 | Refills: 0 | Status: DISCONTINUED | OUTPATIENT
Start: 2017-04-07 | End: 2017-04-07

## 2017-04-07 RX ORDER — DIPHENHYDRAMINE HCL 50 MG
25 CAPSULE ORAL ONCE
Qty: 0 | Refills: 0 | Status: COMPLETED | OUTPATIENT
Start: 2017-04-07 | End: 2017-04-07

## 2017-04-07 RX ORDER — BUDESONIDE AND FORMOTEROL FUMARATE DIHYDRATE 160; 4.5 UG/1; UG/1
1 AEROSOL RESPIRATORY (INHALATION)
Qty: 10 | Refills: 0 | OUTPATIENT
Start: 2017-04-07 | End: 2017-05-07

## 2017-04-07 RX ORDER — INSULIN LISPRO 100/ML
8 VIAL (ML) SUBCUTANEOUS
Qty: 0 | Refills: 0 | Status: DISCONTINUED | OUTPATIENT
Start: 2017-04-07 | End: 2017-04-08

## 2017-04-07 RX ORDER — LISINOPRIL 2.5 MG/1
1 TABLET ORAL
Qty: 0 | Refills: 0 | COMMUNITY

## 2017-04-07 RX ORDER — CARVEDILOL PHOSPHATE 80 MG/1
1 CAPSULE, EXTENDED RELEASE ORAL
Qty: 0 | Refills: 0 | COMMUNITY

## 2017-04-07 RX ADMIN — MORPHINE SULFATE 4 MILLIGRAM(S): 50 CAPSULE, EXTENDED RELEASE ORAL at 19:48

## 2017-04-07 RX ADMIN — Medication 25 MILLIGRAM(S): at 21:39

## 2017-04-07 RX ADMIN — Medication 6 UNIT(S): at 14:15

## 2017-04-07 RX ADMIN — Medication 1 GRAM(S): at 17:11

## 2017-04-07 RX ADMIN — LEVALBUTEROL 0.63 MILLIGRAM(S): 1.25 SOLUTION, CONCENTRATE RESPIRATORY (INHALATION) at 05:33

## 2017-04-07 RX ADMIN — Medication 1 GRAM(S): at 21:10

## 2017-04-07 RX ADMIN — LEVALBUTEROL 0.63 MILLIGRAM(S): 1.25 SOLUTION, CONCENTRATE RESPIRATORY (INHALATION) at 21:40

## 2017-04-07 RX ADMIN — SIMETHICONE 80 MILLIGRAM(S): 80 TABLET, CHEWABLE ORAL at 12:15

## 2017-04-07 RX ADMIN — MIRTAZAPINE 45 MILLIGRAM(S): 45 TABLET, ORALLY DISINTEGRATING ORAL at 22:01

## 2017-04-07 RX ADMIN — SENNA PLUS 2 TABLET(S): 8.6 TABLET ORAL at 21:09

## 2017-04-07 RX ADMIN — Medication 25 MICROGRAM(S): at 05:31

## 2017-04-07 RX ADMIN — OXYCODONE HYDROCHLORIDE 15 MILLIGRAM(S): 5 TABLET ORAL at 20:49

## 2017-04-07 RX ADMIN — CYCLOBENZAPRINE HYDROCHLORIDE 10 MILLIGRAM(S): 10 TABLET, FILM COATED ORAL at 17:10

## 2017-04-07 RX ADMIN — OXYCODONE HYDROCHLORIDE 15 MILLIGRAM(S): 5 TABLET ORAL at 13:40

## 2017-04-07 RX ADMIN — MORPHINE SULFATE 4 MILLIGRAM(S): 50 CAPSULE, EXTENDED RELEASE ORAL at 12:15

## 2017-04-07 RX ADMIN — MORPHINE SULFATE 4 MILLIGRAM(S): 50 CAPSULE, EXTENDED RELEASE ORAL at 05:50

## 2017-04-07 RX ADMIN — Medication 6 UNIT(S): at 09:21

## 2017-04-07 RX ADMIN — Medication: at 18:22

## 2017-04-07 RX ADMIN — LACTULOSE 10 GRAM(S): 10 SOLUTION ORAL at 13:40

## 2017-04-07 RX ADMIN — OXYCODONE HYDROCHLORIDE 15 MILLIGRAM(S): 5 TABLET ORAL at 14:23

## 2017-04-07 RX ADMIN — MORPHINE SULFATE 4 MILLIGRAM(S): 50 CAPSULE, EXTENDED RELEASE ORAL at 06:56

## 2017-04-07 RX ADMIN — Medication 1: at 09:21

## 2017-04-07 RX ADMIN — Medication 600 MILLIGRAM(S): at 05:49

## 2017-04-07 RX ADMIN — MORPHINE SULFATE 4 MILLIGRAM(S): 50 CAPSULE, EXTENDED RELEASE ORAL at 19:33

## 2017-04-07 RX ADMIN — TIOTROPIUM BROMIDE 1 CAPSULE(S): 18 CAPSULE ORAL; RESPIRATORY (INHALATION) at 09:32

## 2017-04-07 RX ADMIN — Medication 1: at 22:02

## 2017-04-07 RX ADMIN — Medication 1 GRAM(S): at 12:15

## 2017-04-07 RX ADMIN — DULOXETINE HYDROCHLORIDE 60 MILLIGRAM(S): 30 CAPSULE, DELAYED RELEASE ORAL at 17:10

## 2017-04-07 RX ADMIN — DULOXETINE HYDROCHLORIDE 60 MILLIGRAM(S): 30 CAPSULE, DELAYED RELEASE ORAL at 05:31

## 2017-04-07 RX ADMIN — INSULIN GLARGINE 15 UNIT(S): 100 INJECTION, SOLUTION SUBCUTANEOUS at 22:02

## 2017-04-07 RX ADMIN — Medication 1 GRAM(S): at 09:21

## 2017-04-07 RX ADMIN — Medication 1.5 MILLIGRAM(S): at 22:01

## 2017-04-07 RX ADMIN — LACTULOSE 10 GRAM(S): 10 SOLUTION ORAL at 21:09

## 2017-04-07 RX ADMIN — Medication 8 UNIT(S): at 18:22

## 2017-04-07 RX ADMIN — Medication 600 MILLIGRAM(S): at 21:39

## 2017-04-07 RX ADMIN — Medication 300 MILLIGRAM(S): at 22:01

## 2017-04-07 RX ADMIN — MORPHINE SULFATE 4 MILLIGRAM(S): 50 CAPSULE, EXTENDED RELEASE ORAL at 13:23

## 2017-04-07 RX ADMIN — OXYCODONE HYDROCHLORIDE 15 MILLIGRAM(S): 5 TABLET ORAL at 19:49

## 2017-04-07 RX ADMIN — Medication: at 14:15

## 2017-04-07 RX ADMIN — LACTULOSE 10 GRAM(S): 10 SOLUTION ORAL at 05:31

## 2017-04-07 RX ADMIN — GABAPENTIN 300 MILLIGRAM(S): 400 CAPSULE ORAL at 21:09

## 2017-04-07 RX ADMIN — GABAPENTIN 300 MILLIGRAM(S): 400 CAPSULE ORAL at 13:40

## 2017-04-07 RX ADMIN — LEVALBUTEROL 0.63 MILLIGRAM(S): 1.25 SOLUTION, CONCENTRATE RESPIRATORY (INHALATION) at 15:42

## 2017-04-07 RX ADMIN — GABAPENTIN 300 MILLIGRAM(S): 400 CAPSULE ORAL at 05:31

## 2017-04-07 NOTE — DISCHARGE NOTE ADULT - CARE PLAN
Principal Discharge DX:	Syncope  Goal:	Hold antihypertensives  Instructions for follow-up, activity and diet:	Please hold off on taking your antihypertensives for now until you follow up with your PMD. Follow up with Dr. Baron, your PMD.  Secondary Diagnosis:	Type 2 diabetes mellitus with complication, unspecified long term insulin use status  Goal:	Good blood sugar control  Instructions for follow-up, activity and diet:	Please take your medications as prescribed. Your finger sticks sugar level were high in the hospital. You were managed with basal bolus insulin regimen. Follow up with your PMD to help manage your diabetes.  Secondary Diagnosis:	Cirrhosis  Goal:	Follow up with Dr. Perez  Instructions for follow-up, activity and diet:	Take your medications as prescribed. Follow up with your GI doctor.  Secondary Diagnosis:	Chronic bilateral low back pain with sciatica, sciatica laterality unspecified  Goal:	Pain control  Instructions for follow-up, activity and diet:	Please take your pain meds as prescribed. Follow up with Dr. Trent, your pain doctor for continued management of your pain. Principal Discharge DX:	Syncope  Goal:	Hold antihypertensives  Instructions for follow-up, activity and diet:	Please hold off on taking your antihypertensives for now until you follow up with your PMD. Follow up with Dr. Baron, your PMD. Follow up with your PMD within 2 weeks to check your BP and re-start your BP meds as needed given your syncope associated with hypotension.  Secondary Diagnosis:	Type 2 diabetes mellitus with complication, unspecified long term insulin use status  Goal:	Good blood sugar control  Instructions for follow-up, activity and diet:	Please take your medications as prescribed. Your finger sticks sugar level were high in the hospital. You were managed with basal bolus insulin regimen. Follow up with your PMD to help manage your diabetes.  Secondary Diagnosis:	Cirrhosis  Goal:	Follow up with Dr. Perez  Instructions for follow-up, activity and diet:	Take your medications as prescribed. Follow up with your GI doctor.  Secondary Diagnosis:	Chronic bilateral low back pain with sciatica, sciatica laterality unspecified  Goal:	Pain control  Instructions for follow-up, activity and diet:	Please take your pain meds as prescribed. Follow up with Dr. Trent, your pain doctor for continued management of your pain. Principal Discharge DX:	Syncope  Goal:	Hold antihypertensives  Instructions for follow-up, activity and diet:	Please hold off on taking your antihypertensives for now until you follow up with your PMD. Follow up with Dr. Baron, your PMD. Follow up with your PMD within 2 weeks to check your BP and re-start your BP meds as needed given your syncope associated with hypotension.  Secondary Diagnosis:	Type 2 diabetes mellitus with complication, unspecified long term insulin use status  Goal:	Good blood sugar control  Instructions for follow-up, activity and diet:	Please take your medications as prescribed. Your finger sticks sugar level were high in the hospital. You were managed with basal bolus insulin regimen. Follow up with your PMD, Dr. Pelayo to help manage your diabetes.  Secondary Diagnosis:	Cirrhosis  Goal:	Follow up with Dr. Perez  Instructions for follow-up, activity and diet:	Take your medications as prescribed. Follow up with your GI doctor.  Secondary Diagnosis:	Chronic bilateral low back pain with sciatica, sciatica laterality unspecified  Goal:	Pain control  Instructions for follow-up, activity and diet:	Please take your pain meds as prescribed. Follow up with Dr. Trent, your pain doctor for continued management of your pain.

## 2017-04-07 NOTE — DISCHARGE NOTE ADULT - SECONDARY DIAGNOSIS.
Type 2 diabetes mellitus with complication, unspecified long term insulin use status Cirrhosis Chronic bilateral low back pain with sciatica, sciatica laterality unspecified

## 2017-04-07 NOTE — DISCHARGE NOTE ADULT - PROVIDER TOKENS
TOKEN:'[id number]',TOKEN:'[id number]' TOKEN:'[id number]:'[id number]',FREE:[LAST:[Sabas],FIRST:[Federico],PHONE:[phone]],FAX:[(   )    -],ADDRESS:[[address] [place] [address]]]

## 2017-04-07 NOTE — DISCHARGE NOTE ADULT - MEDICATION SUMMARY - MEDICATIONS TO STOP TAKING
I will STOP taking the medications listed below when I get home from the hospital:    lisinopril 20 mg oral tablet  -- 1 tab(s) by mouth once a day    Coreg 3.125 mg oral tablet  -- 1 tab(s) by mouth 2 times a day    oseltamivir 75 mg oral capsule  -- 1 cap(s) by mouth once a day  -- Check with your doctor before becoming pregnant.  Finish all this medication unless otherwise directed by prescriber.

## 2017-04-07 NOTE — DISCHARGE NOTE ADULT - PLAN OF CARE
Hold antihypertensives Please hold off on taking your antihypertensives for now until you follow up with your PMD. Follow up with Dr. Baron, your PMD. Good blood sugar control Please take your medications as prescribed. Your finger sticks sugar level were high in the hospital. You were managed with basal bolus insulin regimen. Follow up with your PMD to help manage your diabetes. Follow up with Dr. Perez Take your medications as prescribed. Follow up with your GI doctor. Pain control Please take your pain meds as prescribed. Follow up with Dr. Trent, your pain doctor for continued management of your pain. Please hold off on taking your antihypertensives for now until you follow up with your PMD. Follow up with Dr. Baron, your PMD. Follow up with your PMD within 2 weeks to check your BP and re-start your BP meds as needed given your syncope associated with hypotension. Please take your medications as prescribed. Your finger sticks sugar level were high in the hospital. You were managed with basal bolus insulin regimen. Follow up with your PMD, Dr. Pelayo to help manage your diabetes.

## 2017-04-07 NOTE — DISCHARGE NOTE ADULT - PATIENT PORTAL LINK FT
“You can access the Follow[facility] Patient Portal, offered by [facility], by registering with the following website: [email or web address]”

## 2017-04-07 NOTE — DISCHARGE NOTE ADULT - CARE PROVIDERS DIRECT ADDRESSES
,DirectAddress_Unknown,[email or web address],DirectAddress_Unknown ,DirectAddress_Unknown,[email or web address],DirectAddress_Unknown,DirectAddress_Unknown

## 2017-04-07 NOTE — DISCHARGE NOTE ADULT - MEDICATION SUMMARY - MEDICATIONS TO TAKE
I will START or STAY ON the medications listed below when I get home from the hospital:    Sloan-track glaucometer and supplies  -- 1 application subcutaneous once a day  -- Indication: For Type 2 diabetes mellitus with complication, unspecified long term insulin use status    oxyCODONE 15 mg oral tablet  -- 1 tab(s) by mouth every 6 hours, As Needed  -- Indication: For Chronic bilateral low back pain with sciatica, sciatica laterality unspecified    KlonoPIN 0.5 mg oral tablet  -- 3 tab(s) by mouth once a day (at bedtime)  -- Indication: For Chronic bilateral low back pain with sciatica, sciatica laterality unspecified    Neurontin 300 mg oral capsule  -- 1 cap(s) by mouth 3 times a day  -- Indication: For Chronic bilateral low back pain with sciatica, sciatica laterality unspecified    Cymbalta 60 mg oral delayed release capsule  --  by mouth 2 times a day  -- Indication: For Chronic bilateral low back pain with sciatica, sciatica laterality unspecified    trazodone 300 mg oral tablet  -- 1 tab(s) by mouth once a day (at bedtime)  -- Indication: For anxiety    mirtazapine 45 mg oral tablet  -- 1 tab(s) by mouth once a day (at bedtime)  -- Indication: For depression    metformin 1000 mg oral tablet  -- 1 tab(s) by mouth 2 times a day  -- Indication: For Type 2 diabetes mellitus with complication, unspecified long term insulin use status    Xopenex HFA CFC free 45 mcg/inh inhalation aerosol  -- 2 puff(s) inhaled every 4 hours PRN  -- Indication: For COPD (chronic obstructive pulmonary disease)    Spiriva  --  inhaled once a day, As Needed  -- Indication: For COPD (chronic obstructive pulmonary disease)    guaiFENesin 600 mg oral tablet, extended release  -- 1 tab(s) by mouth every 12 hours, As needed, congestion  -- Indication: For COugh    lactulose 10 g/15 mL oral syrup  -- 15 milliliter(s) by mouth every 8 hours  -- Indication: For Cirrhosis    Senna  --  by mouth once a day (at bedtime)  -- Indication: For COnstipation    Xifaxan 550 mg oral tablet  -- 1 tab(s) by mouth 2 times a day  -- Indication: For Cirrhosis    sucralfate 1 g oral tablet  -- 1 tab(s) by mouth 4 times a day (before meals and at bedtime)  -- Indication: For COnstipation    simethicone 80 mg oral tablet, chewable  -- 1 tab(s) by mouth every 6 hours, As needed, Gas  -- Indication: For gas pain    cyclobenzaprine 10 mg oral tablet  -- 1 tab(s) by mouth 3 times a day, As Needed  -- Indication: For Chronic bilateral low back pain with sciatica, sciatica laterality unspecified    Synthroid 25 mcg (0.025 mg) oral tablet  -- 1 tab(s) by mouth once a day  -- Indication: For Hyothyroidism I will START or STAY ON the medications listed below when I get home from the hospital:    Sloan-track glaucometer and supplies  -- 1 application subcutaneous once a day  -- Indication: For Type 2 diabetes mellitus with complication, unspecified long term insulin use status    oxyCODONE 15 mg oral tablet  -- 1 tab(s) by mouth every 6 hours, As Needed  -- Indication: For Chronic bilateral low back pain with sciatica, sciatica laterality unspecified    KlonoPIN 0.5 mg oral tablet  -- 3 tab(s) by mouth once a day (at bedtime)  -- Indication: For Chronic bilateral low back pain with sciatica, sciatica laterality unspecified    Neurontin 300 mg oral capsule  -- 1 cap(s) by mouth 3 times a day  -- Indication: For Chronic bilateral low back pain with sciatica, sciatica laterality unspecified    Cymbalta 60 mg oral delayed release capsule  --  by mouth 2 times a day  -- Indication: For Chronic bilateral low back pain with sciatica, sciatica laterality unspecified    trazodone 300 mg oral tablet  -- 1 tab(s) by mouth once a day (at bedtime)  -- Indication: For anxiety    mirtazapine 45 mg oral tablet  -- 1 tab(s) by mouth once a day (at bedtime)  -- Indication: For depression    metformin 1000 mg oral tablet  -- 1 tab(s) by mouth 2 times a day  -- Indication: For Type 2 diabetes mellitus with complication, unspecified long term insulin use status    Xopenex HFA CFC free 45 mcg/inh inhalation aerosol  -- 2 puff(s) inhaled every 4 hours PRN  -- Indication: For COPD (chronic obstructive pulmonary disease)    Spiriva  --  inhaled once a day, As Needed  -- Indication: For COPD (chronic obstructive pulmonary disease)    Symbicort 160 mcg-4.5 mcg/inh inhalation aerosol  -- 1 puff(s) inhaled 2 times a day  -- Check with your doctor before becoming pregnant.  For inhalation only.  Rinse mouth thoroughly after use.    -- Indication: For COPD (chronic obstructive pulmonary disease)    guaiFENesin 600 mg oral tablet, extended release  -- 1 tab(s) by mouth every 12 hours, As needed, congestion  -- Indication: For COugh    lactulose 10 g/15 mL oral syrup  -- 15 milliliter(s) by mouth every 8 hours  -- Indication: For Cirrhosis    Senna  --  by mouth once a day (at bedtime)  -- Indication: For COnstipation    Xifaxan 550 mg oral tablet  -- 1 tab(s) by mouth 2 times a day  -- Indication: For Cirrhosis    sucralfate 1 g oral tablet  -- 1 tab(s) by mouth 4 times a day (before meals and at bedtime)  -- Indication: For COnstipation    simethicone 80 mg oral tablet, chewable  -- 1 tab(s) by mouth every 6 hours, As needed, Gas  -- Indication: For gas pain    cyclobenzaprine 10 mg oral tablet  -- 1 tab(s) by mouth 3 times a day, As Needed  -- Indication: For Chronic bilateral low back pain with sciatica, sciatica laterality unspecified    Synthroid 25 mcg (0.025 mg) oral tablet  -- 1 tab(s) by mouth once a day  -- Indication: For Hyothyroidism

## 2017-04-07 NOTE — DISCHARGE NOTE ADULT - CARE PROVIDER_API CALL
Gray Baron), Cardiovascular Disease  [address] [place] [address] [address]  [place] [address]  Phone: [phone]  Fax: [phone]    Marissa Perez), Gastroenterology; Internal Medicine  [address] [place] [address]  Phone: [phone]  Fax: [phone] Gray Baron), Cardiovascular Disease  [address] [place] [address] [address]  [place] [address]  Phone: [phone]  Fax: [phone]    Marissa Perez), Gastroenterology; Internal Medicine  [address] [place] [address]  Phone: [phone]  Fax: [phone]    Federico Obregon  [address] [place] [address] [address]   [place] [address]  Phone: [phone]  Fax: (       -

## 2017-04-08 VITALS
SYSTOLIC BLOOD PRESSURE: 120 MMHG | RESPIRATION RATE: 18 BRPM | OXYGEN SATURATION: 96 % | HEART RATE: 92 BPM | DIASTOLIC BLOOD PRESSURE: 81 MMHG | TEMPERATURE: 98 F

## 2017-04-08 LAB
CULTURE RESULTS: SIGNIFICANT CHANGE UP
CULTURE RESULTS: SIGNIFICANT CHANGE UP
SPECIMEN SOURCE: SIGNIFICANT CHANGE UP
SPECIMEN SOURCE: SIGNIFICANT CHANGE UP

## 2017-04-08 PROCEDURE — 84132 ASSAY OF SERUM POTASSIUM: CPT

## 2017-04-08 PROCEDURE — 84295 ASSAY OF SERUM SODIUM: CPT

## 2017-04-08 PROCEDURE — 85014 HEMATOCRIT: CPT

## 2017-04-08 PROCEDURE — 82803 BLOOD GASES ANY COMBINATION: CPT

## 2017-04-08 PROCEDURE — 85027 COMPLETE CBC AUTOMATED: CPT

## 2017-04-08 PROCEDURE — 82435 ASSAY OF BLOOD CHLORIDE: CPT

## 2017-04-08 PROCEDURE — 80048 BASIC METABOLIC PNL TOTAL CA: CPT

## 2017-04-08 PROCEDURE — 82330 ASSAY OF CALCIUM: CPT

## 2017-04-08 PROCEDURE — 93306 TTE W/DOPPLER COMPLETE: CPT

## 2017-04-08 PROCEDURE — 83690 ASSAY OF LIPASE: CPT

## 2017-04-08 PROCEDURE — 80053 COMPREHEN METABOLIC PANEL: CPT

## 2017-04-08 PROCEDURE — 85730 THROMBOPLASTIN TIME PARTIAL: CPT

## 2017-04-08 PROCEDURE — 85610 PROTHROMBIN TIME: CPT

## 2017-04-08 PROCEDURE — 87486 CHLMYD PNEUM DNA AMP PROBE: CPT

## 2017-04-08 PROCEDURE — 72170 X-RAY EXAM OF PELVIS: CPT

## 2017-04-08 PROCEDURE — 84484 ASSAY OF TROPONIN QUANT: CPT

## 2017-04-08 PROCEDURE — G0378: CPT

## 2017-04-08 PROCEDURE — 94640 AIRWAY INHALATION TREATMENT: CPT

## 2017-04-08 PROCEDURE — 87086 URINE CULTURE/COLONY COUNT: CPT

## 2017-04-08 PROCEDURE — 99285 EMERGENCY DEPT VISIT HI MDM: CPT | Mod: 25

## 2017-04-08 PROCEDURE — 99239 HOSP IP/OBS DSCHRG MGMT >30: CPT

## 2017-04-08 PROCEDURE — 71045 X-RAY EXAM CHEST 1 VIEW: CPT

## 2017-04-08 PROCEDURE — 82947 ASSAY GLUCOSE BLOOD QUANT: CPT

## 2017-04-08 PROCEDURE — 83735 ASSAY OF MAGNESIUM: CPT

## 2017-04-08 PROCEDURE — 82550 ASSAY OF CK (CPK): CPT

## 2017-04-08 PROCEDURE — 83880 ASSAY OF NATRIURETIC PEPTIDE: CPT

## 2017-04-08 PROCEDURE — 87581 M.PNEUMON DNA AMP PROBE: CPT

## 2017-04-08 PROCEDURE — 70450 CT HEAD/BRAIN W/O DYE: CPT

## 2017-04-08 PROCEDURE — 84100 ASSAY OF PHOSPHORUS: CPT

## 2017-04-08 PROCEDURE — 82553 CREATINE MB FRACTION: CPT

## 2017-04-08 PROCEDURE — 84443 ASSAY THYROID STIM HORMONE: CPT

## 2017-04-08 PROCEDURE — 87798 DETECT AGENT NOS DNA AMP: CPT

## 2017-04-08 PROCEDURE — 87040 BLOOD CULTURE FOR BACTERIA: CPT

## 2017-04-08 PROCEDURE — 87633 RESP VIRUS 12-25 TARGETS: CPT

## 2017-04-08 PROCEDURE — 81001 URINALYSIS AUTO W/SCOPE: CPT

## 2017-04-08 PROCEDURE — 83605 ASSAY OF LACTIC ACID: CPT

## 2017-04-08 RX ADMIN — Medication 1 GRAM(S): at 09:40

## 2017-04-08 RX ADMIN — OXYCODONE HYDROCHLORIDE 15 MILLIGRAM(S): 5 TABLET ORAL at 10:11

## 2017-04-08 RX ADMIN — Medication 25 MICROGRAM(S): at 06:03

## 2017-04-08 RX ADMIN — Medication 8 UNIT(S): at 09:43

## 2017-04-08 RX ADMIN — Medication 1 GRAM(S): at 13:08

## 2017-04-08 RX ADMIN — OXYCODONE HYDROCHLORIDE 15 MILLIGRAM(S): 5 TABLET ORAL at 02:59

## 2017-04-08 RX ADMIN — GABAPENTIN 300 MILLIGRAM(S): 400 CAPSULE ORAL at 06:03

## 2017-04-08 RX ADMIN — CYCLOBENZAPRINE HYDROCHLORIDE 10 MILLIGRAM(S): 10 TABLET, FILM COATED ORAL at 13:17

## 2017-04-08 RX ADMIN — CYCLOBENZAPRINE HYDROCHLORIDE 10 MILLIGRAM(S): 10 TABLET, FILM COATED ORAL at 06:11

## 2017-04-08 RX ADMIN — OXYCODONE HYDROCHLORIDE 15 MILLIGRAM(S): 5 TABLET ORAL at 01:59

## 2017-04-08 RX ADMIN — Medication 8 UNIT(S): at 13:18

## 2017-04-08 RX ADMIN — LACTULOSE 10 GRAM(S): 10 SOLUTION ORAL at 06:03

## 2017-04-08 RX ADMIN — Medication 3: at 09:43

## 2017-04-08 RX ADMIN — GABAPENTIN 300 MILLIGRAM(S): 400 CAPSULE ORAL at 13:16

## 2017-04-08 RX ADMIN — DULOXETINE HYDROCHLORIDE 60 MILLIGRAM(S): 30 CAPSULE, DELAYED RELEASE ORAL at 06:03

## 2017-04-08 RX ADMIN — TIOTROPIUM BROMIDE 1 CAPSULE(S): 18 CAPSULE ORAL; RESPIRATORY (INHALATION) at 13:15

## 2017-04-08 RX ADMIN — Medication 4: at 13:19

## 2017-04-08 RX ADMIN — OXYCODONE HYDROCHLORIDE 15 MILLIGRAM(S): 5 TABLET ORAL at 11:13

## 2017-04-14 ENCOUNTER — INPATIENT (INPATIENT)
Facility: HOSPITAL | Age: 62
LOS: 4 days | Discharge: HOME | DRG: 638 | End: 2017-04-19
Attending: HOSPITALIST | Admitting: HOSPITALIST
Payer: MEDICARE

## 2017-04-14 VITALS
RESPIRATION RATE: 18 BRPM | HEART RATE: 97 BPM | OXYGEN SATURATION: 93 % | DIASTOLIC BLOOD PRESSURE: 85 MMHG | SYSTOLIC BLOOD PRESSURE: 133 MMHG

## 2017-04-14 LAB — GAS PNL BLDV: SIGNIFICANT CHANGE UP

## 2017-04-14 PROCEDURE — 93010 ELECTROCARDIOGRAM REPORT: CPT

## 2017-04-14 PROCEDURE — 99285 EMERGENCY DEPT VISIT HI MDM: CPT | Mod: 25

## 2017-04-14 RX ORDER — IPRATROPIUM/ALBUTEROL SULFATE 18-103MCG
3 AEROSOL WITH ADAPTER (GRAM) INHALATION ONCE
Qty: 0 | Refills: 0 | Status: COMPLETED | OUTPATIENT
Start: 2017-04-14 | End: 2017-04-14

## 2017-04-14 RX ORDER — MORPHINE SULFATE 50 MG/1
4 CAPSULE, EXTENDED RELEASE ORAL ONCE
Qty: 0 | Refills: 0 | Status: DISCONTINUED | OUTPATIENT
Start: 2017-04-14 | End: 2017-04-14

## 2017-04-14 NOTE — ED PROVIDER NOTE - ATTENDING CONTRIBUTION TO CARE
Attending MD Carranza: I personally have seen and examined this patient.  Resident note reviewed and agree on plan of care and except where noted.  See below for details.    61F with extensive PMH including DM2, COPD not on O2, HTN, HLD, cirrhosis, AMARO, hypothyroid, TIA presents to the ED with hyperglycemia for a week. Reports FS have been 400-500s.  REports saw PMD Monday who told her she had oral thrush and vaginal yeast infection, gave meds.  Since then elevated FS.  Reoprts chronic cough, denies fevers, chills.  Denies abdominal pain, nausea, vomiting, diarrhea, blood in stools. Reports chronic back pain.  Took 20 Lantus, 25 Novolog + 15 Novolog.  On exam, head NCAT, PERRL, moist oral mucosa, no thrush noted, FROM at neck, no tenderness to palpation or stepoffs along length of spine, +paraspinal tenderness bilateral lower back, lungs +end expiratory wheezing with good inspiratory effort, +S1S2, no m/r/g, abdomen soft with +BS, NT, ND, no CVAT, moving all extremities with 5/5 strength bilateral upper and lower extremities, good and equal  strength bilaterally; Plan: Nebs, solumedrol, FS q1hr, IVFs, CXR, EKG, Labs, UA, reassess

## 2017-04-14 NOTE — ED PROVIDER NOTE - OBJECTIVE STATEMENT
60 yo woman with PMH of  HTN, HLD, COPD (not on home O2), liver cirrhosis 2/2 AAMRO, hypothyroid, anxiety/depression (documented), Lumbar Laminectomy with disk herniation, TIA bibems with hyperglycemia and lbp.  Home glc was 540, took 20 lantus, 25 novolog and called EMS, was still 476 in ambulance.  Denies confusion, fever, chills, cp, sob, worsening cough, abdominal pain, diarrhea, dysuria.  Back pain is BL LBP c/w chronic back pain.  Takes oxycodone 15mg IR 4x daily for pain.  Pt was recently admitted for syncopal episode at [facility], had normal cardiac, neuro and medical w/u's and was d/c 7d ago.      PMD: Tod  Cardio: Tod

## 2017-04-14 NOTE — ED PROVIDER NOTE - PMH
Anemia    Anxiety    Anxiety and depression    Asthma    Chronic sinusitis    Cirrhosis    Constipation, chronic    COPD (chronic obstructive pulmonary disease)  no recent exacerb  Diabetes mellitus  type 2  Fall at home    Fatty liver    GERD (gastroesophageal reflux disease)    Hyperlipidemia    Hypertension    Lung nodules    Syncope and collapse  10/2016 with long hospital stay at [facility], with negative cardiac work up as per patient, s/p angiogram too with no blockages  as per patient. + right arm nerve damages.  Thrombocytopenia    TIA (transient ischemic attack)  10/2016 on ASA and Plavix as per Dr. Perez

## 2017-04-14 NOTE — ED ADULT NURSE NOTE - OBJECTIVE STATEMENT
60 y/o female BIBEMS c/o hyperglycemia at home. Pt states for past 1 week, FS have been in 500's. FS at home today was over 500 after taking 20 Units Humalog and a5 Units Lantus. Pt called PCP and was told to take an additional 15 Units of Humalog. Pt FS while w/ . Decreased to 324 on arrival to ED. Pt additionally endorses chronic back pain, last took oxycodone at 2000. Pt currently being treated oupt. for Bronchitis. BL wheezing present. Pt denies CP, SOB at rest, no n/v/fever/chills. Pt AOx4, ambulatory w/ standby assist. 62 y/o female BIBEMS c/o hyperglycemia at home. Pt states for past 1 week, FS have been in 500's. FS at home today was over 500 after taking 25 Units Humalog and 20 Units Lantus. Pt called PCP and was told to take an additional 15 Units of Humalog. Pt FS while w/ . Decreased to 324 on arrival to ED. Pt additionally endorses chronic back pain, last took oxycodone at 2000. Pt currently being treated oupt. for Bronchitis. BL wheezing present. Pt denies CP, SOB at rest, no n/v/fever/chills. Pt AOx4, ambulatory w/ standby assist.

## 2017-04-14 NOTE — ED ADULT NURSE NOTE - PMH
Anemia    Anxiety    Anxiety and depression    Asthma    Chronic sinusitis    Cirrhosis    Constipation, chronic    COPD (chronic obstructive pulmonary disease)  no recent exacerb  Diabetes mellitus  type 2  Fall at home    Fatty liver    GERD (gastroesophageal reflux disease)    Hyperlipidemia    Hypertension    Lung nodules    Syncope and collapse  10/2016 with long hospital stay at [facility], with negative cardiac work up as per patient, s/p angiogram too with no blockages  as per patient. + right arm nerve damages.  Thrombocytopenia    TIA (transient ischemic attack)  10/2016 on ASA and Plavix as per Dr. Perez

## 2017-04-15 DIAGNOSIS — R73.9 HYPERGLYCEMIA, UNSPECIFIED: ICD-10-CM

## 2017-04-15 DIAGNOSIS — J44.9 CHRONIC OBSTRUCTIVE PULMONARY DISEASE, UNSPECIFIED: ICD-10-CM

## 2017-04-15 DIAGNOSIS — K74.60 UNSPECIFIED CIRRHOSIS OF LIVER: ICD-10-CM

## 2017-04-15 DIAGNOSIS — K59.00 CONSTIPATION, UNSPECIFIED: ICD-10-CM

## 2017-04-15 DIAGNOSIS — E03.9 HYPOTHYROIDISM, UNSPECIFIED: ICD-10-CM

## 2017-04-15 DIAGNOSIS — B49 UNSPECIFIED MYCOSIS: ICD-10-CM

## 2017-04-15 DIAGNOSIS — E11.9 TYPE 2 DIABETES MELLITUS WITHOUT COMPLICATIONS: ICD-10-CM

## 2017-04-15 DIAGNOSIS — Z41.8 ENCOUNTER FOR OTHER PROCEDURES FOR PURPOSES OTHER THAN REMEDYING HEALTH STATE: ICD-10-CM

## 2017-04-15 DIAGNOSIS — M54.5 LOW BACK PAIN: ICD-10-CM

## 2017-04-15 LAB
ALBUMIN SERPL ELPH-MCNC: 3.8 G/DL — SIGNIFICANT CHANGE UP (ref 3.3–5)
ALP SERPL-CCNC: 165 U/L — HIGH (ref 40–120)
ALT FLD-CCNC: 51 U/L RC — HIGH (ref 10–45)
ANION GAP SERPL CALC-SCNC: 16 MMOL/L — SIGNIFICANT CHANGE UP (ref 5–17)
ANION GAP SERPL CALC-SCNC: 17 MMOL/L — SIGNIFICANT CHANGE UP (ref 5–17)
APPEARANCE UR: CLEAR — SIGNIFICANT CHANGE UP
AST SERPL-CCNC: 50 U/L — HIGH (ref 10–40)
BACTERIA # UR AUTO: ABNORMAL /HPF
BASE EXCESS BLDV CALC-SCNC: -1.2 MMOL/L — SIGNIFICANT CHANGE UP (ref -2–2)
BASOPHILS # BLD AUTO: 0 K/UL — SIGNIFICANT CHANGE UP (ref 0–0.2)
BASOPHILS NFR BLD AUTO: 0.8 % — SIGNIFICANT CHANGE UP (ref 0–2)
BILIRUB SERPL-MCNC: 0.2 MG/DL — SIGNIFICANT CHANGE UP (ref 0.2–1.2)
BILIRUB UR-MCNC: NEGATIVE — SIGNIFICANT CHANGE UP
BUN SERPL-MCNC: 10 MG/DL — SIGNIFICANT CHANGE UP (ref 7–23)
BUN SERPL-MCNC: 11 MG/DL — SIGNIFICANT CHANGE UP (ref 7–23)
CA-I SERPL-SCNC: 1.21 MMOL/L — SIGNIFICANT CHANGE UP (ref 1.12–1.3)
CALCIUM SERPL-MCNC: 8.7 MG/DL — SIGNIFICANT CHANGE UP (ref 8.4–10.5)
CALCIUM SERPL-MCNC: 9.3 MG/DL — SIGNIFICANT CHANGE UP (ref 8.4–10.5)
CHLORIDE BLDV-SCNC: 102 MMOL/L — SIGNIFICANT CHANGE UP (ref 96–108)
CHLORIDE SERPL-SCNC: 98 MMOL/L — SIGNIFICANT CHANGE UP (ref 96–108)
CHLORIDE SERPL-SCNC: 99 MMOL/L — SIGNIFICANT CHANGE UP (ref 96–108)
CO2 BLDV-SCNC: 27 MMOL/L — SIGNIFICANT CHANGE UP (ref 22–30)
CO2 SERPL-SCNC: 21 MMOL/L — LOW (ref 22–31)
CO2 SERPL-SCNC: 22 MMOL/L — SIGNIFICANT CHANGE UP (ref 22–31)
COLOR SPEC: YELLOW — SIGNIFICANT CHANGE UP
CREAT SERPL-MCNC: 0.85 MG/DL — SIGNIFICANT CHANGE UP (ref 0.5–1.3)
CREAT SERPL-MCNC: 0.9 MG/DL — SIGNIFICANT CHANGE UP (ref 0.5–1.3)
DIFF PNL FLD: NEGATIVE — SIGNIFICANT CHANGE UP
EOSINOPHIL # BLD AUTO: 0.1 K/UL — SIGNIFICANT CHANGE UP (ref 0–0.5)
EOSINOPHIL NFR BLD AUTO: 3 % — SIGNIFICANT CHANGE UP (ref 0–6)
EPI CELLS # UR: SIGNIFICANT CHANGE UP /HPF
GAS PNL BLDV: 132 MMOL/L — LOW (ref 136–145)
GAS PNL BLDV: SIGNIFICANT CHANGE UP
GAS PNL BLDV: SIGNIFICANT CHANGE UP
GLUCOSE BLDV-MCNC: 305 MG/DL — HIGH (ref 70–99)
GLUCOSE SERPL-MCNC: 188 MG/DL — HIGH (ref 70–99)
GLUCOSE SERPL-MCNC: 321 MG/DL — HIGH (ref 70–99)
GLUCOSE UR QL: >1000
HCO3 BLDV-SCNC: 25 MMOL/L — SIGNIFICANT CHANGE UP (ref 21–29)
HCT VFR BLD CALC: 31.6 % — LOW (ref 34.5–45)
HCT VFR BLDA CALC: 30 % — LOW (ref 39–50)
HGB BLD CALC-MCNC: 9.7 G/DL — LOW (ref 11.5–15.5)
HGB BLD-MCNC: 10.6 G/DL — LOW (ref 11.5–15.5)
KETONES UR-MCNC: NEGATIVE — SIGNIFICANT CHANGE UP
LACTATE BLDV-MCNC: 2.3 MMOL/L — HIGH (ref 0.7–2)
LEUKOCYTE ESTERASE UR-ACNC: NEGATIVE — SIGNIFICANT CHANGE UP
LYMPHOCYTES # BLD AUTO: 1.3 K/UL — SIGNIFICANT CHANGE UP (ref 1–3.3)
LYMPHOCYTES # BLD AUTO: 31.6 % — SIGNIFICANT CHANGE UP (ref 13–44)
MCHC RBC-ENTMCNC: 28.4 PG — SIGNIFICANT CHANGE UP (ref 27–34)
MCHC RBC-ENTMCNC: 33.5 GM/DL — SIGNIFICANT CHANGE UP (ref 32–36)
MCV RBC AUTO: 85 FL — SIGNIFICANT CHANGE UP (ref 80–100)
MONOCYTES # BLD AUTO: 0.5 K/UL — SIGNIFICANT CHANGE UP (ref 0–0.9)
MONOCYTES NFR BLD AUTO: 12.3 % — SIGNIFICANT CHANGE UP (ref 2–14)
NEUTROPHILS # BLD AUTO: 2.1 K/UL — SIGNIFICANT CHANGE UP (ref 1.8–7.4)
NEUTROPHILS NFR BLD AUTO: 52.5 % — SIGNIFICANT CHANGE UP (ref 43–77)
NITRITE UR-MCNC: NEGATIVE — SIGNIFICANT CHANGE UP
PCO2 BLDV: 55 MMHG — HIGH (ref 35–50)
PH BLDV: 7.29 — LOW (ref 7.35–7.45)
PH UR: 5.5 — SIGNIFICANT CHANGE UP (ref 4.8–8)
PLATELET # BLD AUTO: 87 K/UL — LOW (ref 150–400)
PO2 BLDV: 58 MMHG — HIGH (ref 25–45)
POTASSIUM BLDV-SCNC: 4.4 MMOL/L — SIGNIFICANT CHANGE UP (ref 3.5–5)
POTASSIUM SERPL-MCNC: 4.3 MMOL/L — SIGNIFICANT CHANGE UP (ref 3.5–5.3)
POTASSIUM SERPL-MCNC: 4.8 MMOL/L — SIGNIFICANT CHANGE UP (ref 3.5–5.3)
POTASSIUM SERPL-SCNC: 4.3 MMOL/L — SIGNIFICANT CHANGE UP (ref 3.5–5.3)
POTASSIUM SERPL-SCNC: 4.8 MMOL/L — SIGNIFICANT CHANGE UP (ref 3.5–5.3)
PROT SERPL-MCNC: 7.4 G/DL — SIGNIFICANT CHANGE UP (ref 6–8.3)
PROT UR-MCNC: NEGATIVE — SIGNIFICANT CHANGE UP
RBC # BLD: 3.71 M/UL — LOW (ref 3.8–5.2)
RBC # FLD: 14.1 % — SIGNIFICANT CHANGE UP (ref 10.3–14.5)
RBC CASTS # UR COMP ASSIST: SIGNIFICANT CHANGE UP /HPF (ref 0–2)
SAO2 % BLDV: 86 % — SIGNIFICANT CHANGE UP (ref 67–88)
SODIUM SERPL-SCNC: 136 MMOL/L — SIGNIFICANT CHANGE UP (ref 135–145)
SODIUM SERPL-SCNC: 137 MMOL/L — SIGNIFICANT CHANGE UP (ref 135–145)
SP GR SPEC: 1.01 — SIGNIFICANT CHANGE UP (ref 1.01–1.02)
UROBILINOGEN FLD QL: NEGATIVE — SIGNIFICANT CHANGE UP
WBC # BLD: 4 K/UL — SIGNIFICANT CHANGE UP (ref 3.8–10.5)
WBC # FLD AUTO: 4 K/UL — SIGNIFICANT CHANGE UP (ref 3.8–10.5)
WBC UR QL: SIGNIFICANT CHANGE UP /HPF (ref 0–5)

## 2017-04-15 PROCEDURE — 71010: CPT | Mod: 26

## 2017-04-15 PROCEDURE — 99223 1ST HOSP IP/OBS HIGH 75: CPT | Mod: GC

## 2017-04-15 RX ORDER — ACETAMINOPHEN 500 MG
650 TABLET ORAL EVERY 6 HOURS
Qty: 0 | Refills: 0 | Status: DISCONTINUED | OUTPATIENT
Start: 2017-04-15 | End: 2017-04-19

## 2017-04-15 RX ORDER — DEXTROSE 50 % IN WATER 50 %
1 SYRINGE (ML) INTRAVENOUS ONCE
Qty: 0 | Refills: 0 | Status: DISCONTINUED | OUTPATIENT
Start: 2017-04-15 | End: 2017-04-19

## 2017-04-15 RX ORDER — INSULIN LISPRO 100/ML
VIAL (ML) SUBCUTANEOUS AT BEDTIME
Qty: 0 | Refills: 0 | Status: DISCONTINUED | OUTPATIENT
Start: 2017-04-15 | End: 2017-04-19

## 2017-04-15 RX ORDER — OXYCODONE HYDROCHLORIDE 5 MG/1
15 TABLET ORAL EVERY 6 HOURS
Qty: 0 | Refills: 0 | Status: DISCONTINUED | OUTPATIENT
Start: 2017-04-15 | End: 2017-04-15

## 2017-04-15 RX ORDER — LACTULOSE 10 G/15ML
10 SOLUTION ORAL EVERY 8 HOURS
Qty: 0 | Refills: 0 | Status: DISCONTINUED | OUTPATIENT
Start: 2017-04-15 | End: 2017-04-19

## 2017-04-15 RX ORDER — MORPHINE SULFATE 50 MG/1
6 CAPSULE, EXTENDED RELEASE ORAL EVERY 6 HOURS
Qty: 0 | Refills: 0 | Status: DISCONTINUED | OUTPATIENT
Start: 2017-04-15 | End: 2017-04-16

## 2017-04-15 RX ORDER — SODIUM CHLORIDE 9 MG/ML
1000 INJECTION, SOLUTION INTRAVENOUS
Qty: 0 | Refills: 0 | Status: DISCONTINUED | OUTPATIENT
Start: 2017-04-15 | End: 2017-04-19

## 2017-04-15 RX ORDER — TIOTROPIUM BROMIDE 18 UG/1
1 CAPSULE ORAL; RESPIRATORY (INHALATION) DAILY
Qty: 0 | Refills: 0 | Status: DISCONTINUED | OUTPATIENT
Start: 2017-04-15 | End: 2017-04-19

## 2017-04-15 RX ORDER — NYSTATIN 500MM UNIT
500000 POWDER (EA) MISCELLANEOUS
Qty: 0 | Refills: 0 | Status: DISCONTINUED | OUTPATIENT
Start: 2017-04-15 | End: 2017-04-19

## 2017-04-15 RX ORDER — LEVALBUTEROL 1.25 MG/.5ML
0.63 SOLUTION, CONCENTRATE RESPIRATORY (INHALATION) EVERY 6 HOURS
Qty: 0 | Refills: 0 | Status: DISCONTINUED | OUTPATIENT
Start: 2017-04-15 | End: 2017-04-19

## 2017-04-15 RX ORDER — LEVOTHYROXINE SODIUM 125 MCG
1 TABLET ORAL
Qty: 0 | Refills: 0 | COMMUNITY

## 2017-04-15 RX ORDER — DEXTROSE 50 % IN WATER 50 %
25 SYRINGE (ML) INTRAVENOUS ONCE
Qty: 0 | Refills: 0 | Status: DISCONTINUED | OUTPATIENT
Start: 2017-04-15 | End: 2017-04-19

## 2017-04-15 RX ORDER — DEXTROSE 50 % IN WATER 50 %
12.5 SYRINGE (ML) INTRAVENOUS ONCE
Qty: 0 | Refills: 0 | Status: DISCONTINUED | OUTPATIENT
Start: 2017-04-15 | End: 2017-04-19

## 2017-04-15 RX ORDER — LEVOTHYROXINE SODIUM 125 MCG
25 TABLET ORAL DAILY
Qty: 0 | Refills: 0 | Status: DISCONTINUED | OUTPATIENT
Start: 2017-04-15 | End: 2017-04-15

## 2017-04-15 RX ORDER — SUCRALFATE 1 G
1 TABLET ORAL
Qty: 0 | Refills: 0 | Status: DISCONTINUED | OUTPATIENT
Start: 2017-04-15 | End: 2017-04-19

## 2017-04-15 RX ORDER — INSULIN LISPRO 100/ML
8 VIAL (ML) SUBCUTANEOUS
Qty: 0 | Refills: 0 | Status: DISCONTINUED | OUTPATIENT
Start: 2017-04-15 | End: 2017-04-16

## 2017-04-15 RX ORDER — INSULIN LISPRO 100/ML
VIAL (ML) SUBCUTANEOUS
Qty: 0 | Refills: 0 | Status: DISCONTINUED | OUTPATIENT
Start: 2017-04-15 | End: 2017-04-19

## 2017-04-15 RX ORDER — TRAZODONE HCL 50 MG
1 TABLET ORAL
Qty: 0 | Refills: 0 | COMMUNITY

## 2017-04-15 RX ORDER — DULOXETINE HYDROCHLORIDE 30 MG/1
60 CAPSULE, DELAYED RELEASE ORAL
Qty: 0 | Refills: 0 | Status: DISCONTINUED | OUTPATIENT
Start: 2017-04-15 | End: 2017-04-19

## 2017-04-15 RX ORDER — HEPARIN SODIUM 5000 [USP'U]/ML
5000 INJECTION INTRAVENOUS; SUBCUTANEOUS EVERY 8 HOURS
Qty: 0 | Refills: 0 | Status: DISCONTINUED | OUTPATIENT
Start: 2017-04-15 | End: 2017-04-16

## 2017-04-15 RX ORDER — CYCLOBENZAPRINE HYDROCHLORIDE 10 MG/1
10 TABLET, FILM COATED ORAL THREE TIMES A DAY
Qty: 0 | Refills: 0 | Status: DISCONTINUED | OUTPATIENT
Start: 2017-04-15 | End: 2017-04-19

## 2017-04-15 RX ORDER — BUDESONIDE AND FORMOTEROL FUMARATE DIHYDRATE 160; 4.5 UG/1; UG/1
1 AEROSOL RESPIRATORY (INHALATION)
Qty: 0 | Refills: 0 | Status: DISCONTINUED | OUTPATIENT
Start: 2017-04-15 | End: 2017-04-19

## 2017-04-15 RX ORDER — TRAZODONE HCL 50 MG
300 TABLET ORAL AT BEDTIME
Qty: 0 | Refills: 0 | Status: DISCONTINUED | OUTPATIENT
Start: 2017-04-15 | End: 2017-04-19

## 2017-04-15 RX ORDER — GLUCAGON INJECTION, SOLUTION 0.5 MG/.1ML
1 INJECTION, SOLUTION SUBCUTANEOUS ONCE
Qty: 0 | Refills: 0 | Status: DISCONTINUED | OUTPATIENT
Start: 2017-04-15 | End: 2017-04-19

## 2017-04-15 RX ORDER — GABAPENTIN 400 MG/1
300 CAPSULE ORAL THREE TIMES A DAY
Qty: 0 | Refills: 0 | Status: DISCONTINUED | OUTPATIENT
Start: 2017-04-15 | End: 2017-04-19

## 2017-04-15 RX ORDER — SODIUM CHLORIDE 9 MG/ML
2000 INJECTION INTRAMUSCULAR; INTRAVENOUS; SUBCUTANEOUS ONCE
Qty: 0 | Refills: 0 | Status: COMPLETED | OUTPATIENT
Start: 2017-04-15 | End: 2017-04-15

## 2017-04-15 RX ORDER — SIMETHICONE 80 MG/1
80 TABLET, CHEWABLE ORAL EVERY 6 HOURS
Qty: 0 | Refills: 0 | Status: DISCONTINUED | OUTPATIENT
Start: 2017-04-15 | End: 2017-04-19

## 2017-04-15 RX ORDER — MIRTAZAPINE 45 MG/1
45 TABLET, ORALLY DISINTEGRATING ORAL AT BEDTIME
Qty: 0 | Refills: 0 | Status: DISCONTINUED | OUTPATIENT
Start: 2017-04-15 | End: 2017-04-19

## 2017-04-15 RX ORDER — INSULIN LISPRO 100/ML
5 VIAL (ML) SUBCUTANEOUS
Qty: 0 | Refills: 0 | Status: DISCONTINUED | OUTPATIENT
Start: 2017-04-15 | End: 2017-04-15

## 2017-04-15 RX ORDER — FLUCONAZOLE 150 MG/1
150 TABLET ORAL DAILY
Qty: 0 | Refills: 0 | Status: DISCONTINUED | OUTPATIENT
Start: 2017-04-15 | End: 2017-04-15

## 2017-04-15 RX ORDER — CLONAZEPAM 1 MG
4 TABLET ORAL
Qty: 0 | Refills: 0 | COMMUNITY

## 2017-04-15 RX ORDER — MORPHINE SULFATE 50 MG/1
2 CAPSULE, EXTENDED RELEASE ORAL ONCE
Qty: 0 | Refills: 0 | Status: DISCONTINUED | OUTPATIENT
Start: 2017-04-15 | End: 2017-04-15

## 2017-04-15 RX ORDER — CLONAZEPAM 1 MG
3 TABLET ORAL
Qty: 0 | Refills: 0 | COMMUNITY

## 2017-04-15 RX ORDER — FLUCONAZOLE 150 MG/1
150 TABLET ORAL ONCE
Qty: 0 | Refills: 0 | Status: COMPLETED | OUTPATIENT
Start: 2017-04-15 | End: 2017-04-15

## 2017-04-15 RX ORDER — INSULIN GLARGINE 100 [IU]/ML
0 INJECTION, SOLUTION SUBCUTANEOUS
Qty: 0 | Refills: 0 | COMMUNITY

## 2017-04-15 RX ORDER — INSULIN GLARGINE 100 [IU]/ML
20 INJECTION, SOLUTION SUBCUTANEOUS AT BEDTIME
Qty: 0 | Refills: 0 | Status: DISCONTINUED | OUTPATIENT
Start: 2017-04-15 | End: 2017-04-16

## 2017-04-15 RX ORDER — CLONAZEPAM 1 MG
1.5 TABLET ORAL AT BEDTIME
Qty: 0 | Refills: 0 | Status: DISCONTINUED | OUTPATIENT
Start: 2017-04-15 | End: 2017-04-19

## 2017-04-15 RX ORDER — MORPHINE SULFATE 50 MG/1
4 CAPSULE, EXTENDED RELEASE ORAL ONCE
Qty: 0 | Refills: 0 | Status: DISCONTINUED | OUTPATIENT
Start: 2017-04-15 | End: 2017-04-15

## 2017-04-15 RX ORDER — LEVOTHYROXINE SODIUM 125 MCG
50 TABLET ORAL DAILY
Qty: 0 | Refills: 0 | Status: DISCONTINUED | OUTPATIENT
Start: 2017-04-15 | End: 2017-04-19

## 2017-04-15 RX ADMIN — Medication 1.5 MILLIGRAM(S): at 22:54

## 2017-04-15 RX ADMIN — SODIUM CHLORIDE 2000 MILLILITER(S): 9 INJECTION INTRAMUSCULAR; INTRAVENOUS; SUBCUTANEOUS at 03:17

## 2017-04-15 RX ADMIN — Medication 2: at 23:00

## 2017-04-15 RX ADMIN — MORPHINE SULFATE 2 MILLIGRAM(S): 50 CAPSULE, EXTENDED RELEASE ORAL at 12:05

## 2017-04-15 RX ADMIN — Medication 3: at 18:01

## 2017-04-15 RX ADMIN — Medication 8 UNIT(S): at 18:00

## 2017-04-15 RX ADMIN — HEPARIN SODIUM 5000 UNIT(S): 5000 INJECTION INTRAVENOUS; SUBCUTANEOUS at 22:47

## 2017-04-15 RX ADMIN — Medication 650 MILLIGRAM(S): at 22:52

## 2017-04-15 RX ADMIN — MORPHINE SULFATE 2 MILLIGRAM(S): 50 CAPSULE, EXTENDED RELEASE ORAL at 12:35

## 2017-04-15 RX ADMIN — LACTULOSE 10 GRAM(S): 10 SOLUTION ORAL at 13:43

## 2017-04-15 RX ADMIN — HEPARIN SODIUM 5000 UNIT(S): 5000 INJECTION INTRAVENOUS; SUBCUTANEOUS at 13:18

## 2017-04-15 RX ADMIN — MORPHINE SULFATE 6 MILLIGRAM(S): 50 CAPSULE, EXTENDED RELEASE ORAL at 19:44

## 2017-04-15 RX ADMIN — Medication 3 MILLILITER(S): at 00:07

## 2017-04-15 RX ADMIN — Medication 650 MILLIGRAM(S): at 08:34

## 2017-04-15 RX ADMIN — TIOTROPIUM BROMIDE 1 CAPSULE(S): 18 CAPSULE ORAL; RESPIRATORY (INHALATION) at 17:25

## 2017-04-15 RX ADMIN — OXYCODONE HYDROCHLORIDE 15 MILLIGRAM(S): 5 TABLET ORAL at 10:59

## 2017-04-15 RX ADMIN — Medication 300 MILLIGRAM(S): at 22:47

## 2017-04-15 RX ADMIN — Medication 1 GRAM(S): at 13:17

## 2017-04-15 RX ADMIN — Medication 650 MILLIGRAM(S): at 09:00

## 2017-04-15 RX ADMIN — DULOXETINE HYDROCHLORIDE 60 MILLIGRAM(S): 30 CAPSULE, DELAYED RELEASE ORAL at 19:06

## 2017-04-15 RX ADMIN — LEVALBUTEROL 0.63 MILLIGRAM(S): 1.25 SOLUTION, CONCENTRATE RESPIRATORY (INHALATION) at 19:38

## 2017-04-15 RX ADMIN — GABAPENTIN 300 MILLIGRAM(S): 400 CAPSULE ORAL at 13:17

## 2017-04-15 RX ADMIN — FLUCONAZOLE 150 MILLIGRAM(S): 150 TABLET ORAL at 13:43

## 2017-04-15 RX ADMIN — OXYCODONE HYDROCHLORIDE 15 MILLIGRAM(S): 5 TABLET ORAL at 11:30

## 2017-04-15 RX ADMIN — Medication 650 MILLIGRAM(S): at 23:28

## 2017-04-15 RX ADMIN — Medication 500000 UNIT(S): at 18:45

## 2017-04-15 RX ADMIN — Medication 1 GRAM(S): at 18:06

## 2017-04-15 RX ADMIN — GABAPENTIN 300 MILLIGRAM(S): 400 CAPSULE ORAL at 22:46

## 2017-04-15 RX ADMIN — INSULIN GLARGINE 20 UNIT(S): 100 INJECTION, SOLUTION SUBCUTANEOUS at 22:48

## 2017-04-15 RX ADMIN — MORPHINE SULFATE 4 MILLIGRAM(S): 50 CAPSULE, EXTENDED RELEASE ORAL at 05:07

## 2017-04-15 RX ADMIN — MORPHINE SULFATE 6 MILLIGRAM(S): 50 CAPSULE, EXTENDED RELEASE ORAL at 20:14

## 2017-04-15 RX ADMIN — MORPHINE SULFATE 6 MILLIGRAM(S): 50 CAPSULE, EXTENDED RELEASE ORAL at 13:32

## 2017-04-15 RX ADMIN — Medication 4: at 12:51

## 2017-04-15 RX ADMIN — MORPHINE SULFATE 4 MILLIGRAM(S): 50 CAPSULE, EXTENDED RELEASE ORAL at 00:23

## 2017-04-15 RX ADMIN — Medication 1 GRAM(S): at 22:00

## 2017-04-15 RX ADMIN — MORPHINE SULFATE 6 MILLIGRAM(S): 50 CAPSULE, EXTENDED RELEASE ORAL at 14:00

## 2017-04-15 RX ADMIN — LACTULOSE 10 GRAM(S): 10 SOLUTION ORAL at 22:49

## 2017-04-15 RX ADMIN — MIRTAZAPINE 45 MILLIGRAM(S): 45 TABLET, ORALLY DISINTEGRATING ORAL at 22:47

## 2017-04-15 RX ADMIN — CYCLOBENZAPRINE HYDROCHLORIDE 10 MILLIGRAM(S): 10 TABLET, FILM COATED ORAL at 11:00

## 2017-04-15 NOTE — H&P ADULT. - PROBLEM SELECTOR PLAN 5
-not in acute exacerbation, doesn't take O2 at home   -pateint stating that she takes nebulizer ATC  - start Xopenex ATC,   -continue home Singulair

## 2017-04-15 NOTE — H&P ADULT. - RS GEN PE MLT RESP DETAILS PC
good air movement/clear to auscultation bilaterally/airway patent/breath sounds equal/respirations non-labored

## 2017-04-15 NOTE — H&P ADULT. - ATTENDING COMMENTS
60 yo woman with PMH of AMARO cirrhosis, DM2, hypothyroidism, anxiety/depression, chronic back pain due to disc herniation a/w hyperglycemia and back pain.  Pt reports her sugars have been as high as 400-500 lately.  Yesterday was advised by her PMD to increase lantus from 16 units to 20units and start taking novolog 8 units with meals.  Was on steroids for back pain but off for last week or two she says.  Reports compliance with diabetic diet.  Back pain is also worse.  Says her surgeon is willing to do surgery but her PMD says no way she will clear her.  Her pain specialist wanted to do an epidural but not arranged yet.  Pt asking for 6mg IV morphine.  Reports she was on oxycontin but family said she was too sleepy so she stopped it.  No new neurologic symptoms in legs.  Also had thrush, started on nystatin - has a hoarse voice.    On exam, vitals as above. She is an obese woman, NAD, ambulating in room.  Mouth - no thrush seen. Chest: CTA b/l, no w/r/r. CV: RRR, nl S1S2, no m/r/g. Abd: Soft, NT/ND. Back: Lower midline tenderness.  Neuro - A+Ox3, CN II-XII intact, 4/5 strength in legs b/l, sensation intact.  Labs noted.    A/P: Hyperglycemia; chronic back pain; possible esophagitis    1) Hyperglycemia  -lantus was just increased from 16 to 20 units and put on novolog yesterday so will continue with this regimen and monitor sugars  -diabetic diet    2) Chronic low back pain  -no new red flags for cord compression  -c/w home oxycodone  -very insistent on morphine 6mg IV - told her we will do it today and maybe tomorrow and then stop  -discussed my worries about her long term use of opiates - told her we will not increase or change her outpt regimen - advised her to continue following with her surgeon and pain specialist - current plan is for epidural    3) Hoarse voice  -mouth clear of thrush now - c/w nystatin swish and swallow - if no improvement in voice can get outpt endoscopy with her GI if they think it indicated

## 2017-04-15 NOTE — H&P ADULT. - PROBLEM SELECTOR PLAN 2
-chronic pain 2/2 L2 - S1 spur  - start Morphine 6mg Q 6hrs prn  - would d/c pt on home regiment to f/u with outpt pain MD,  after optimization

## 2017-04-15 NOTE — H&P ADULT. - PMH
Anemia    Anxiety    Anxiety and depression    Asthma    Chronic sinusitis    Cirrhosis    Constipation, chronic    COPD (chronic obstructive pulmonary disease)  no recent exacerb  Diabetes mellitus  type 2  Fall at home    Fatty liver    GERD (gastroesophageal reflux disease)    Hyperlipidemia    Hypertension    Lung nodules    Syncope and collapse  10/2016 with long hospital stay at [facility], with negative cardiac work up as per patient, s/p angiogram too with no blockages  as per patient. + right arm nerve damages.  Thrombocytopenia    TIA (transient ischemic attack)  10/2016 on ASA and Plavix as per Dr. Perez

## 2017-04-15 NOTE — H&P ADULT. - PROBLEM SELECTOR PLAN 1
-2/2 DM II c/b steroid use  -hyperglycemic agents were recently modified by addition on Lantus 20U , 8U Humalog  pre meal to home Metformin 100mg BID  -Start patient on Lantus 20U QHS , with premeal Humalog 8U and ISS  -monitor Finger sticks and modify insulin regiment as needed  -monitor BMP and electrolytes daily and replete as needed

## 2017-04-15 NOTE — H&P ADULT. - SENSORY
paraesthesia in R lateral wrist, and R-lower lateral Sural nerve distribution.  back pain with (+) straight leg raise bilaterally

## 2017-04-15 NOTE — H&P ADULT. - ASSESSMENT
61F PMHx HTN, HLD, Bronchitis, liver cirrhosis 2/2 AMARO, hypothyroidism, anxiety/depression, insomnia, Lumbar Laminectomy with disk herniation, TIA BIBEMS for hyperglycemia in 500s and LBP

## 2017-04-15 NOTE — ED ADULT NURSE REASSESSMENT NOTE - NS ED NURSE REASSESS COMMENT FT1
Report given to CHIKI Diego on [facility]. safety and comfort maintained while in ED. Repeat labs drawn prior to transport. No tele ordered at this time. Nonverbal indicators of pain not present at this time.

## 2017-04-15 NOTE — H&P ADULT. - LAB RESULTS AND INTERPRETATION
Labs are significant for metabolic acidosis with an elevated lactate, pH of 7.3. Anion Gap is normal

## 2017-04-15 NOTE — H&P ADULT. - PROBLEM SELECTOR PLAN 3
-opioid induced c/b sedentary lifestyle   -start bowel regiment with Miralax, senna, colace, dulcolax

## 2017-04-15 NOTE — H&P ADULT. - NEGATIVE GENERAL GENITOURINARY SYMPTOMS
normal urinary frequency/no incontinence/no flank pain L/no flank pain R/no urinary hesitancy/no dysuria

## 2017-04-15 NOTE — H&P ADULT. - HISTORY OF PRESENT ILLNESS
62 yo woman with PMH of  HTN, HLD, COPD (not on home O2), liver cirrhosis 2/2 AMARO, hypothyroid, anxiety/depression (documented), Lumbar Laminectomy with disk herniation, TIA bibems with hyperglycemia and lbp.  Home glc was 540, took 20 lantus, 25 novolog and called EMS, was still 476 in ambulance.  Denies confusion, fever, chills, cp, sob, worsening cough, abdominal pain, diarrhea, dysuria.  Back pain is BL LBP c/w chronic back pain.  Takes oxycodone 15mg IR 4x daily for pain.  Pt was recently admitted for syncopal episode at [facility], had normal cardiac, neuro and medical w/u's and was d/c 7d ago. 61F PMHx HTN, HLD, Bronchitis, liver cirrhosis 2/2 AMARO, hypothyroidism, anxiety/depression, insomnia, Lumbar Laminectomy with disk herniation, TIA BIBEMS for hyperglycemia in 500s and lower back pain. Patient states that her blood glucose has been poorly controlled due to steroid use. Patient's pain medicince MD started a tiral of steriods for her lower back pain but pt developed facial swelling with dexamethasone, and solumedrol. Patient wasenies confusion, fever, chills, cp, sob, worsening cough, abdominal pain, diarrhea, dysuria.  Back pain is BL LBP c/w chronic back pain.  Takes oxycodone 15mg IR 4x daily for pain.  Pt was recently admitted for syncopal episode at [facility], had normal cardiac, neuro and medical w/u's and was d/c 7d ago. 61F PMHx HTN, HLD, Bronchitis, liver cirrhosis 2/2 AMARO, hypothyroidism, anxiety/depression, insomnia, Lumbar Laminectomy with disk herniation, TIA BIBEMS for hyperglycemia in 500s and lower back pain. Patient states that her blood glucose has been poorly controlled due to steroid use. Patient is currently on Lantus 20 , Humalog 8 U pre meal, and SSI.   Patient's pain medicine MD started a trial of steroids for her lower back pain but pt developed facial swelling with dexamethasone, and solumedrol. Patient also complained of frontal periorbital headache that started last night associated with double vision and photophobia. Patient has a remote history of migraines but has not had an episode in since she was "young". Pt denies loss of vision, rhinorrhea, lacrimation.  Current pain regiment is oxycodone 15mg IR 4x daily. Patient has laid in bed for the past three day 2/2 back pain and has not passed BM since Thursday   Patient of fungal oral thrush and vaginal candidiasis. Patient denies nausea vomiting, chest pain, fever, no syncope, no SOB, no DAVIS.   Pt was discharged seven days ago after syncopal episode with  normal cardiac, neuro and medical workup.    ED VS: T 98.2 P: 103 /71 RR: 18 O2: 92% on RA    Lactate 2.3, pH 7.3  s/p 2L bolus, Oxycodone 15mg x1, Morphine 4mg

## 2017-04-16 LAB
ANION GAP SERPL CALC-SCNC: 10 MMOL/L — SIGNIFICANT CHANGE UP (ref 5–17)
BUN SERPL-MCNC: 9 MG/DL — SIGNIFICANT CHANGE UP (ref 7–23)
CALCIUM SERPL-MCNC: 8.3 MG/DL — LOW (ref 8.4–10.5)
CHLORIDE SERPL-SCNC: 103 MMOL/L — SIGNIFICANT CHANGE UP (ref 96–108)
CO2 SERPL-SCNC: 26 MMOL/L — SIGNIFICANT CHANGE UP (ref 22–31)
CREAT SERPL-MCNC: 0.74 MG/DL — SIGNIFICANT CHANGE UP (ref 0.5–1.3)
GLUCOSE SERPL-MCNC: 418 MG/DL — HIGH (ref 70–99)
HCT VFR BLD CALC: 29.3 % — LOW (ref 34.5–45)
HGB BLD-MCNC: 9.6 G/DL — LOW (ref 11.5–15.5)
MAGNESIUM SERPL-MCNC: 1.9 MG/DL — SIGNIFICANT CHANGE UP (ref 1.6–2.6)
MCHC RBC-ENTMCNC: 28.1 PG — SIGNIFICANT CHANGE UP (ref 27–34)
MCHC RBC-ENTMCNC: 32.8 GM/DL — SIGNIFICANT CHANGE UP (ref 32–36)
MCV RBC AUTO: 85.8 FL — SIGNIFICANT CHANGE UP (ref 80–100)
PHOSPHATE SERPL-MCNC: 3.3 MG/DL — SIGNIFICANT CHANGE UP (ref 2.5–4.5)
PLATELET # BLD AUTO: 75 K/UL — LOW (ref 150–400)
POTASSIUM SERPL-MCNC: 4.7 MMOL/L — SIGNIFICANT CHANGE UP (ref 3.5–5.3)
POTASSIUM SERPL-SCNC: 4.7 MMOL/L — SIGNIFICANT CHANGE UP (ref 3.5–5.3)
RBC # BLD: 3.41 M/UL — LOW (ref 3.8–5.2)
RBC # FLD: 14.3 % — SIGNIFICANT CHANGE UP (ref 10.3–14.5)
SODIUM SERPL-SCNC: 139 MMOL/L — SIGNIFICANT CHANGE UP (ref 135–145)
WBC # BLD: 2.3 K/UL — LOW (ref 3.8–10.5)
WBC # FLD AUTO: 2.3 K/UL — LOW (ref 3.8–10.5)

## 2017-04-16 PROCEDURE — 99233 SBSQ HOSP IP/OBS HIGH 50: CPT | Mod: GC

## 2017-04-16 RX ORDER — INSULIN GLARGINE 100 [IU]/ML
26 INJECTION, SOLUTION SUBCUTANEOUS AT BEDTIME
Qty: 0 | Refills: 0 | Status: DISCONTINUED | OUTPATIENT
Start: 2017-04-16 | End: 2017-04-17

## 2017-04-16 RX ORDER — INSULIN LISPRO 100/ML
10 VIAL (ML) SUBCUTANEOUS
Qty: 0 | Refills: 0 | Status: DISCONTINUED | OUTPATIENT
Start: 2017-04-16 | End: 2017-04-18

## 2017-04-16 RX ORDER — POLYETHYLENE GLYCOL 3350 17 G/17G
17 POWDER, FOR SOLUTION ORAL DAILY
Qty: 0 | Refills: 0 | Status: DISCONTINUED | OUTPATIENT
Start: 2017-04-16 | End: 2017-04-17

## 2017-04-16 RX ORDER — DOCUSATE SODIUM 100 MG
100 CAPSULE ORAL THREE TIMES A DAY
Qty: 0 | Refills: 0 | Status: DISCONTINUED | OUTPATIENT
Start: 2017-04-16 | End: 2017-04-19

## 2017-04-16 RX ORDER — MORPHINE SULFATE 50 MG/1
6 CAPSULE, EXTENDED RELEASE ORAL EVERY 4 HOURS
Qty: 0 | Refills: 0 | Status: DISCONTINUED | OUTPATIENT
Start: 2017-04-16 | End: 2017-04-17

## 2017-04-16 RX ADMIN — BUDESONIDE AND FORMOTEROL FUMARATE DIHYDRATE 1 PUFF(S): 160; 4.5 AEROSOL RESPIRATORY (INHALATION) at 06:19

## 2017-04-16 RX ADMIN — GABAPENTIN 300 MILLIGRAM(S): 400 CAPSULE ORAL at 22:06

## 2017-04-16 RX ADMIN — LEVALBUTEROL 0.63 MILLIGRAM(S): 1.25 SOLUTION, CONCENTRATE RESPIRATORY (INHALATION) at 10:18

## 2017-04-16 RX ADMIN — Medication 650 MILLIGRAM(S): at 19:45

## 2017-04-16 RX ADMIN — Medication 1: at 17:32

## 2017-04-16 RX ADMIN — SIMETHICONE 80 MILLIGRAM(S): 80 TABLET, CHEWABLE ORAL at 16:23

## 2017-04-16 RX ADMIN — DULOXETINE HYDROCHLORIDE 60 MILLIGRAM(S): 30 CAPSULE, DELAYED RELEASE ORAL at 17:30

## 2017-04-16 RX ADMIN — Medication 500000 UNIT(S): at 06:56

## 2017-04-16 RX ADMIN — MORPHINE SULFATE 6 MILLIGRAM(S): 50 CAPSULE, EXTENDED RELEASE ORAL at 21:34

## 2017-04-16 RX ADMIN — Medication 300 MILLIGRAM(S): at 22:31

## 2017-04-16 RX ADMIN — Medication 2: at 12:36

## 2017-04-16 RX ADMIN — Medication 1.5 MILLIGRAM(S): at 22:07

## 2017-04-16 RX ADMIN — LEVALBUTEROL 0.63 MILLIGRAM(S): 1.25 SOLUTION, CONCENTRATE RESPIRATORY (INHALATION) at 17:07

## 2017-04-16 RX ADMIN — Medication 1 GRAM(S): at 12:34

## 2017-04-16 RX ADMIN — HEPARIN SODIUM 5000 UNIT(S): 5000 INJECTION INTRAVENOUS; SUBCUTANEOUS at 06:21

## 2017-04-16 RX ADMIN — POLYETHYLENE GLYCOL 3350 17 GRAM(S): 17 POWDER, FOR SOLUTION ORAL at 12:32

## 2017-04-16 RX ADMIN — MORPHINE SULFATE 6 MILLIGRAM(S): 50 CAPSULE, EXTENDED RELEASE ORAL at 14:00

## 2017-04-16 RX ADMIN — DULOXETINE HYDROCHLORIDE 60 MILLIGRAM(S): 30 CAPSULE, DELAYED RELEASE ORAL at 06:55

## 2017-04-16 RX ADMIN — Medication 500000 UNIT(S): at 17:32

## 2017-04-16 RX ADMIN — Medication 10 UNIT(S): at 12:36

## 2017-04-16 RX ADMIN — Medication 1 GRAM(S): at 08:32

## 2017-04-16 RX ADMIN — MORPHINE SULFATE 6 MILLIGRAM(S): 50 CAPSULE, EXTENDED RELEASE ORAL at 01:43

## 2017-04-16 RX ADMIN — Medication 1 GRAM(S): at 22:06

## 2017-04-16 RX ADMIN — Medication 1 GRAM(S): at 17:30

## 2017-04-16 RX ADMIN — LACTULOSE 10 GRAM(S): 10 SOLUTION ORAL at 06:21

## 2017-04-16 RX ADMIN — CYCLOBENZAPRINE HYDROCHLORIDE 10 MILLIGRAM(S): 10 TABLET, FILM COATED ORAL at 16:22

## 2017-04-16 RX ADMIN — MORPHINE SULFATE 6 MILLIGRAM(S): 50 CAPSULE, EXTENDED RELEASE ORAL at 17:45

## 2017-04-16 RX ADMIN — Medication 600 MILLIGRAM(S): at 09:51

## 2017-04-16 RX ADMIN — Medication 500000 UNIT(S): at 12:32

## 2017-04-16 RX ADMIN — INSULIN GLARGINE 26 UNIT(S): 100 INJECTION, SOLUTION SUBCUTANEOUS at 22:08

## 2017-04-16 RX ADMIN — Medication 500000 UNIT(S): at 00:10

## 2017-04-16 RX ADMIN — TIOTROPIUM BROMIDE 1 CAPSULE(S): 18 CAPSULE ORAL; RESPIRATORY (INHALATION) at 11:06

## 2017-04-16 RX ADMIN — Medication 4: at 08:28

## 2017-04-16 RX ADMIN — MORPHINE SULFATE 6 MILLIGRAM(S): 50 CAPSULE, EXTENDED RELEASE ORAL at 02:13

## 2017-04-16 RX ADMIN — BUDESONIDE AND FORMOTEROL FUMARATE DIHYDRATE 1 PUFF(S): 160; 4.5 AEROSOL RESPIRATORY (INHALATION) at 17:07

## 2017-04-16 RX ADMIN — Medication 10 MILLIGRAM(S): at 16:22

## 2017-04-16 RX ADMIN — GABAPENTIN 300 MILLIGRAM(S): 400 CAPSULE ORAL at 13:45

## 2017-04-16 RX ADMIN — MIRTAZAPINE 45 MILLIGRAM(S): 45 TABLET, ORALLY DISINTEGRATING ORAL at 22:06

## 2017-04-16 RX ADMIN — Medication 8 UNIT(S): at 08:28

## 2017-04-16 RX ADMIN — Medication 100 MILLIGRAM(S): at 22:06

## 2017-04-16 RX ADMIN — Medication 10 UNIT(S): at 17:32

## 2017-04-16 RX ADMIN — Medication 100 MILLIGRAM(S): at 13:45

## 2017-04-16 RX ADMIN — MORPHINE SULFATE 6 MILLIGRAM(S): 50 CAPSULE, EXTENDED RELEASE ORAL at 13:45

## 2017-04-16 RX ADMIN — MORPHINE SULFATE 6 MILLIGRAM(S): 50 CAPSULE, EXTENDED RELEASE ORAL at 10:05

## 2017-04-16 RX ADMIN — GABAPENTIN 300 MILLIGRAM(S): 400 CAPSULE ORAL at 06:21

## 2017-04-16 RX ADMIN — LACTULOSE 10 GRAM(S): 10 SOLUTION ORAL at 22:07

## 2017-04-16 RX ADMIN — Medication 650 MILLIGRAM(S): at 19:15

## 2017-04-16 RX ADMIN — MORPHINE SULFATE 6 MILLIGRAM(S): 50 CAPSULE, EXTENDED RELEASE ORAL at 17:30

## 2017-04-16 RX ADMIN — Medication 50 MICROGRAM(S): at 06:21

## 2017-04-16 RX ADMIN — LACTULOSE 10 GRAM(S): 10 SOLUTION ORAL at 13:45

## 2017-04-16 RX ADMIN — MORPHINE SULFATE 6 MILLIGRAM(S): 50 CAPSULE, EXTENDED RELEASE ORAL at 22:04

## 2017-04-16 RX ADMIN — MORPHINE SULFATE 6 MILLIGRAM(S): 50 CAPSULE, EXTENDED RELEASE ORAL at 09:48

## 2017-04-16 NOTE — DIETITIAN INITIAL EVALUATION ADULT. - NS AS NUTRI INTERV ED CONTENT
Provided pt with type 2 diabetes MNT; discussed with pt carbohydrate sources, the consistent carbohydrate diet, pairing carbohydrate sources with protein/fiber, checking BS daily, evening snack with home Lantus use, low sodium food options, low fat food options, small frequent healthy balanced meals; pt verbalized understanding of nutrition education; expect fair compliance

## 2017-04-16 NOTE — DIETITIAN INITIAL EVALUATION ADULT. - ENERGY NEEDS
ht = 61 inches, wt = 85.1kg (4/15/17), BMI = 35.4, IBW = 47.7kg, 178% IBW  Utilized IBW due to pt >120% IBW. Fluid per MD.

## 2017-04-16 NOTE — DIETITIAN INITIAL EVALUATION ADULT. - OTHER INFO
Pt seen for HbA1c >7 consult- 3rd HbA1c >7 consult at [facility] x 2 months. Pt admitted for hyperglycemia. Pt reports compliance with diabetes diet- referencing the importance of having a consistent amount of CHO daily, having protein and fiber with carbohydrate sources (questionable compliance- RD observed Tarun Mints in pt drawer). Pt states she takes Metformin 500mg BID, Lantus, and Novolog PTA. Pt reports she checks her BS at home 3-4x/day and is very upset to see her numbers so high. Pt records her BS in notebook. Pt reports abdominal pain, cramps, gas, and abdominal distention to which she associates this with GI distress/possibly reproductive issues. Pt reports yeast infection with vaginal spotting. Pt reports her  gave her HPV and she has not seen the gynecologist > 2 years. Pt states "I am refusing to leave the hospital until GI, Endocrinology, and GYN is consulted." Pt reports upper and lower dentures that needs to be refitted- pt denies chewing/swallowing issues. No N + V or diarrhea. Skin: intact, no edema noted. Pt reports fish allergy. Pt seen for HbA1c >7 consult- 3rd HbA1c >7 consult at [facility] x 2 months. Pt admitted for hyperglycemia. Pt reports compliance with diabetes diet- referencing the importance of having a consistent amount of CHO daily, having protein and fiber with carbohydrate sources (questionable compliance- RD observed Tarun Mints in pt drawer). Pt states she takes Metformin 500mg BID, Lantus, and Novolog PTA. Pt reports she checks her BS at home 3-4x/day and is very upset to see her numbers so high. Pt records her BS in notebook. Pt reports abdominal pain, cramps, gas, and abdominal distention to which she associates this with GI distress/possibly reproductive issues. Pt reports yeast infection with vaginal spotting. Pt reports her  gave her HPV and she has not seen the gynecologist > 2 years. Pt states "I am refusing to leave the hospital until GI, Endocrinology, and GYN is consulted."- made RN and team aware. Pt reports upper and lower dentures that needs to be refitted- pt denies chewing/swallowing issues. No N + V or diarrhea. Skin: intact, no edema noted. Pt reports fish allergy.

## 2017-04-16 NOTE — DIETITIAN INITIAL EVALUATION ADULT. - FACTORS AFF FOOD INTAKE
Pt reports decreased appetite due to chronic constipation despite home use of lactulose, 3 stool softeners, 3 senna, and metamucil. Pt reports abdominal pain when consuming food during periods of constipation. Pt reports periods of depression secondary to her  passing away x 5 years and mother passing away recently- pt states this can affect the way she eats. Pt reports decreased appetite due to chronic constipation despite home use of lactulose, 3 stool softeners, 3 senna, and metamucil. Pt reports abdominal pain when consuming food during periods of constipation- made RN and team aware to consider bowel regimen. Pt reports periods of depression secondary to her  passing away x 5 years and mother passing away recently- pt states this can affect the way she eats.

## 2017-04-16 NOTE — DIETITIAN INITIAL EVALUATION ADULT. - ORAL INTAKE PTA
Pt reports consuming 3 meals/day + 2 snacks; Pt does food shopping/cooking at home. Diet Recall: B: cream of wheat, yogurt, boiled eggs; L: whole wheat bagel with cream cheese; S: 1 piece of fruit with handful of nuts; D: leafy greens with cucumber, tomato, dried cranberries, hardboiled eggs; S: peanut butter, sugar free jelly, whole wheat bread, a banana; Pt takes probiotic, B complex, omega 3 supplements- discussed with pt the importance of speaking with MD in regards to continued supplement use/good

## 2017-04-17 LAB
-  AMPICILLIN: SIGNIFICANT CHANGE UP
-  CIPROFLOXACIN: SIGNIFICANT CHANGE UP
-  NITROFURANTOIN: SIGNIFICANT CHANGE UP
-  TETRACYCLINE: SIGNIFICANT CHANGE UP
-  VANCOMYCIN: SIGNIFICANT CHANGE UP
ANION GAP SERPL CALC-SCNC: 3 MMOL/L — LOW (ref 5–17)
BUN SERPL-MCNC: 9 MG/DL — SIGNIFICANT CHANGE UP (ref 7–23)
CALCIUM SERPL-MCNC: 8.8 MG/DL — SIGNIFICANT CHANGE UP (ref 8.4–10.5)
CHLORIDE SERPL-SCNC: 103 MMOL/L — SIGNIFICANT CHANGE UP (ref 96–108)
CO2 SERPL-SCNC: 34 MMOL/L — HIGH (ref 22–31)
CREAT SERPL-MCNC: 0.72 MG/DL — SIGNIFICANT CHANGE UP (ref 0.5–1.3)
CULTURE RESULTS: SIGNIFICANT CHANGE UP
GLUCOSE SERPL-MCNC: 284 MG/DL — HIGH (ref 70–99)
HCT VFR BLD CALC: 30.2 % — LOW (ref 34.5–45)
HGB BLD-MCNC: 10 G/DL — LOW (ref 11.5–15.5)
MAGNESIUM SERPL-MCNC: 1.9 MG/DL — SIGNIFICANT CHANGE UP (ref 1.6–2.6)
MCHC RBC-ENTMCNC: 28.5 PG — SIGNIFICANT CHANGE UP (ref 27–34)
MCHC RBC-ENTMCNC: 33.3 GM/DL — SIGNIFICANT CHANGE UP (ref 32–36)
MCV RBC AUTO: 85.5 FL — SIGNIFICANT CHANGE UP (ref 80–100)
METHOD TYPE: SIGNIFICANT CHANGE UP
ORGANISM # SPEC MICROSCOPIC CNT: SIGNIFICANT CHANGE UP
ORGANISM # SPEC MICROSCOPIC CNT: SIGNIFICANT CHANGE UP
PHOSPHATE SERPL-MCNC: 2.6 MG/DL — SIGNIFICANT CHANGE UP (ref 2.5–4.5)
PLATELET # BLD AUTO: 72 K/UL — LOW (ref 150–400)
POTASSIUM SERPL-MCNC: 4.3 MMOL/L — SIGNIFICANT CHANGE UP (ref 3.5–5.3)
POTASSIUM SERPL-SCNC: 4.3 MMOL/L — SIGNIFICANT CHANGE UP (ref 3.5–5.3)
RBC # BLD: 3.53 M/UL — LOW (ref 3.8–5.2)
RBC # FLD: 14.5 % — SIGNIFICANT CHANGE UP (ref 10.3–14.5)
SODIUM SERPL-SCNC: 140 MMOL/L — SIGNIFICANT CHANGE UP (ref 135–145)
SPECIMEN SOURCE: SIGNIFICANT CHANGE UP
WBC # BLD: 2.2 K/UL — LOW (ref 3.8–10.5)
WBC # FLD AUTO: 2.2 K/UL — LOW (ref 3.8–10.5)

## 2017-04-17 PROCEDURE — 99223 1ST HOSP IP/OBS HIGH 75: CPT

## 2017-04-17 PROCEDURE — 99233 SBSQ HOSP IP/OBS HIGH 50: CPT | Mod: GC

## 2017-04-17 RX ORDER — SENNA PLUS 8.6 MG/1
2 TABLET ORAL AT BEDTIME
Qty: 0 | Refills: 0 | Status: DISCONTINUED | OUTPATIENT
Start: 2017-04-17 | End: 2017-04-19

## 2017-04-17 RX ORDER — METHYLNALTREXONE BROMIDE 12 MG/.6ML
12 INJECTION, SOLUTION SUBCUTANEOUS ONCE
Qty: 0 | Refills: 0 | Status: DISCONTINUED | OUTPATIENT
Start: 2017-04-17 | End: 2017-04-17

## 2017-04-17 RX ORDER — INSULIN GLARGINE 100 [IU]/ML
30 INJECTION, SOLUTION SUBCUTANEOUS AT BEDTIME
Qty: 0 | Refills: 0 | Status: DISCONTINUED | OUTPATIENT
Start: 2017-04-17 | End: 2017-04-18

## 2017-04-17 RX ORDER — PSYLLIUM SEED (WITH DEXTROSE)
1 POWDER (GRAM) ORAL DAILY
Qty: 0 | Refills: 0 | Status: DISCONTINUED | OUTPATIENT
Start: 2017-04-17 | End: 2017-04-19

## 2017-04-17 RX ORDER — HYDROMORPHONE HYDROCHLORIDE 2 MG/ML
2 INJECTION INTRAMUSCULAR; INTRAVENOUS; SUBCUTANEOUS EVERY 4 HOURS
Qty: 0 | Refills: 0 | Status: DISCONTINUED | OUTPATIENT
Start: 2017-04-17 | End: 2017-04-19

## 2017-04-17 RX ORDER — DIPHENHYDRAMINE HCL 50 MG
50 CAPSULE ORAL ONCE
Qty: 0 | Refills: 0 | Status: COMPLETED | OUTPATIENT
Start: 2017-04-17 | End: 2017-04-17

## 2017-04-17 RX ORDER — POLYETHYLENE GLYCOL 3350 17 G/17G
17 POWDER, FOR SOLUTION ORAL
Qty: 0 | Refills: 0 | Status: DISCONTINUED | OUTPATIENT
Start: 2017-04-17 | End: 2017-04-19

## 2017-04-17 RX ADMIN — Medication 100 MILLIGRAM(S): at 21:40

## 2017-04-17 RX ADMIN — LACTULOSE 10 GRAM(S): 10 SOLUTION ORAL at 21:45

## 2017-04-17 RX ADMIN — MORPHINE SULFATE 6 MILLIGRAM(S): 50 CAPSULE, EXTENDED RELEASE ORAL at 18:47

## 2017-04-17 RX ADMIN — Medication 5 MILLIGRAM(S): at 18:03

## 2017-04-17 RX ADMIN — Medication 1 GRAM(S): at 12:49

## 2017-04-17 RX ADMIN — Medication 1 GRAM(S): at 21:40

## 2017-04-17 RX ADMIN — Medication 300 MILLIGRAM(S): at 22:27

## 2017-04-17 RX ADMIN — Medication 50 MILLIGRAM(S): at 19:21

## 2017-04-17 RX ADMIN — Medication 500000 UNIT(S): at 23:56

## 2017-04-17 RX ADMIN — LACTULOSE 10 GRAM(S): 10 SOLUTION ORAL at 13:59

## 2017-04-17 RX ADMIN — Medication 1.5 MILLIGRAM(S): at 22:27

## 2017-04-17 RX ADMIN — Medication 1 PACKET(S): at 13:58

## 2017-04-17 RX ADMIN — Medication 500000 UNIT(S): at 12:50

## 2017-04-17 RX ADMIN — SENNA PLUS 2 TABLET(S): 8.6 TABLET ORAL at 21:44

## 2017-04-17 RX ADMIN — POLYETHYLENE GLYCOL 3350 17 GRAM(S): 17 POWDER, FOR SOLUTION ORAL at 17:25

## 2017-04-17 RX ADMIN — LACTULOSE 10 GRAM(S): 10 SOLUTION ORAL at 06:05

## 2017-04-17 RX ADMIN — Medication 500000 UNIT(S): at 00:15

## 2017-04-17 RX ADMIN — SIMETHICONE 80 MILLIGRAM(S): 80 TABLET, CHEWABLE ORAL at 22:30

## 2017-04-17 RX ADMIN — CYCLOBENZAPRINE HYDROCHLORIDE 10 MILLIGRAM(S): 10 TABLET, FILM COATED ORAL at 12:49

## 2017-04-17 RX ADMIN — Medication 2: at 12:56

## 2017-04-17 RX ADMIN — TIOTROPIUM BROMIDE 1 CAPSULE(S): 18 CAPSULE ORAL; RESPIRATORY (INHALATION) at 11:36

## 2017-04-17 RX ADMIN — Medication 10 UNIT(S): at 09:23

## 2017-04-17 RX ADMIN — GABAPENTIN 300 MILLIGRAM(S): 400 CAPSULE ORAL at 06:05

## 2017-04-17 RX ADMIN — Medication 1 GRAM(S): at 17:25

## 2017-04-17 RX ADMIN — Medication 1 GRAM(S): at 06:05

## 2017-04-17 RX ADMIN — Medication 50 MICROGRAM(S): at 06:05

## 2017-04-17 RX ADMIN — Medication 10 UNIT(S): at 12:57

## 2017-04-17 RX ADMIN — HYDROMORPHONE HYDROCHLORIDE 2 MILLIGRAM(S): 2 INJECTION INTRAMUSCULAR; INTRAVENOUS; SUBCUTANEOUS at 22:35

## 2017-04-17 RX ADMIN — BUDESONIDE AND FORMOTEROL FUMARATE DIHYDRATE 1 PUFF(S): 160; 4.5 AEROSOL RESPIRATORY (INHALATION) at 06:10

## 2017-04-17 RX ADMIN — MORPHINE SULFATE 6 MILLIGRAM(S): 50 CAPSULE, EXTENDED RELEASE ORAL at 09:23

## 2017-04-17 RX ADMIN — Medication 100 MILLIGRAM(S): at 06:05

## 2017-04-17 RX ADMIN — DULOXETINE HYDROCHLORIDE 60 MILLIGRAM(S): 30 CAPSULE, DELAYED RELEASE ORAL at 17:25

## 2017-04-17 RX ADMIN — Medication 500000 UNIT(S): at 06:05

## 2017-04-17 RX ADMIN — MORPHINE SULFATE 6 MILLIGRAM(S): 50 CAPSULE, EXTENDED RELEASE ORAL at 13:59

## 2017-04-17 RX ADMIN — MORPHINE SULFATE 6 MILLIGRAM(S): 50 CAPSULE, EXTENDED RELEASE ORAL at 18:30

## 2017-04-17 RX ADMIN — MORPHINE SULFATE 6 MILLIGRAM(S): 50 CAPSULE, EXTENDED RELEASE ORAL at 10:00

## 2017-04-17 RX ADMIN — Medication 500000 UNIT(S): at 17:25

## 2017-04-17 RX ADMIN — HYDROMORPHONE HYDROCHLORIDE 2 MILLIGRAM(S): 2 INJECTION INTRAMUSCULAR; INTRAVENOUS; SUBCUTANEOUS at 22:05

## 2017-04-17 RX ADMIN — GABAPENTIN 300 MILLIGRAM(S): 400 CAPSULE ORAL at 12:49

## 2017-04-17 RX ADMIN — Medication 1: at 18:03

## 2017-04-17 RX ADMIN — GABAPENTIN 300 MILLIGRAM(S): 400 CAPSULE ORAL at 21:40

## 2017-04-17 RX ADMIN — INSULIN GLARGINE 30 UNIT(S): 100 INJECTION, SOLUTION SUBCUTANEOUS at 22:07

## 2017-04-17 RX ADMIN — MIRTAZAPINE 45 MILLIGRAM(S): 45 TABLET, ORALLY DISINTEGRATING ORAL at 22:27

## 2017-04-17 RX ADMIN — Medication 3: at 09:22

## 2017-04-17 RX ADMIN — DULOXETINE HYDROCHLORIDE 60 MILLIGRAM(S): 30 CAPSULE, DELAYED RELEASE ORAL at 06:05

## 2017-04-17 RX ADMIN — Medication 10 UNIT(S): at 18:03

## 2017-04-17 RX ADMIN — Medication 100 MILLIGRAM(S): at 13:58

## 2017-04-17 RX ADMIN — MORPHINE SULFATE 6 MILLIGRAM(S): 50 CAPSULE, EXTENDED RELEASE ORAL at 14:20

## 2017-04-17 RX ADMIN — BUDESONIDE AND FORMOTEROL FUMARATE DIHYDRATE 1 PUFF(S): 160; 4.5 AEROSOL RESPIRATORY (INHALATION) at 17:32

## 2017-04-18 LAB
ANION GAP SERPL CALC-SCNC: 14 MMOL/L — SIGNIFICANT CHANGE UP (ref 5–17)
BUN SERPL-MCNC: 8 MG/DL — SIGNIFICANT CHANGE UP (ref 7–23)
C PEPTIDE SERPL-MCNC: 4.8 NG/ML — SIGNIFICANT CHANGE UP (ref 0.9–7.1)
CALCIUM SERPL-MCNC: 9.7 MG/DL — SIGNIFICANT CHANGE UP (ref 8.4–10.5)
CHLORIDE SERPL-SCNC: 103 MMOL/L — SIGNIFICANT CHANGE UP (ref 96–108)
CO2 SERPL-SCNC: 26 MMOL/L — SIGNIFICANT CHANGE UP (ref 22–31)
CREAT SERPL-MCNC: 0.83 MG/DL — SIGNIFICANT CHANGE UP (ref 0.5–1.3)
GLUCOSE SERPL-MCNC: 235 MG/DL — HIGH (ref 70–99)
HCT VFR BLD CALC: 32 % — LOW (ref 34.5–45)
HGB BLD-MCNC: 10.8 G/DL — LOW (ref 11.5–15.5)
MAGNESIUM SERPL-MCNC: 2 MG/DL — SIGNIFICANT CHANGE UP (ref 1.6–2.6)
MCHC RBC-ENTMCNC: 28.9 PG — SIGNIFICANT CHANGE UP (ref 27–34)
MCHC RBC-ENTMCNC: 33.7 GM/DL — SIGNIFICANT CHANGE UP (ref 32–36)
MCV RBC AUTO: 85.8 FL — SIGNIFICANT CHANGE UP (ref 80–100)
PHOSPHATE SERPL-MCNC: 3.6 MG/DL — SIGNIFICANT CHANGE UP (ref 2.5–4.5)
PLATELET # BLD AUTO: 94 K/UL — LOW (ref 150–400)
POTASSIUM SERPL-MCNC: 4.6 MMOL/L — SIGNIFICANT CHANGE UP (ref 3.5–5.3)
POTASSIUM SERPL-SCNC: 4.6 MMOL/L — SIGNIFICANT CHANGE UP (ref 3.5–5.3)
RBC # BLD: 3.73 M/UL — LOW (ref 3.8–5.2)
RBC # FLD: 14.6 % — HIGH (ref 10.3–14.5)
SODIUM SERPL-SCNC: 143 MMOL/L — SIGNIFICANT CHANGE UP (ref 135–145)
WBC # BLD: 3.7 K/UL — LOW (ref 3.8–10.5)
WBC # FLD AUTO: 3.7 K/UL — LOW (ref 3.8–10.5)

## 2017-04-18 PROCEDURE — 99232 SBSQ HOSP IP/OBS MODERATE 35: CPT

## 2017-04-18 PROCEDURE — 99233 SBSQ HOSP IP/OBS HIGH 50: CPT | Mod: GC

## 2017-04-18 PROCEDURE — 76830 TRANSVAGINAL US NON-OB: CPT | Mod: 26

## 2017-04-18 RX ORDER — INSULIN LISPRO 100/ML
12 VIAL (ML) SUBCUTANEOUS
Qty: 0 | Refills: 0 | Status: DISCONTINUED | OUTPATIENT
Start: 2017-04-18 | End: 2017-04-19

## 2017-04-18 RX ORDER — INSULIN LISPRO 100/ML
14 VIAL (ML) SUBCUTANEOUS
Qty: 0 | Refills: 0 | Status: DISCONTINUED | OUTPATIENT
Start: 2017-04-18 | End: 2017-04-19

## 2017-04-18 RX ORDER — INSULIN GLARGINE 100 [IU]/ML
36 INJECTION, SOLUTION SUBCUTANEOUS AT BEDTIME
Qty: 0 | Refills: 0 | Status: DISCONTINUED | OUTPATIENT
Start: 2017-04-18 | End: 2017-04-19

## 2017-04-18 RX ADMIN — HYDROMORPHONE HYDROCHLORIDE 2 MILLIGRAM(S): 2 INJECTION INTRAMUSCULAR; INTRAVENOUS; SUBCUTANEOUS at 05:02

## 2017-04-18 RX ADMIN — Medication 50 MICROGRAM(S): at 06:31

## 2017-04-18 RX ADMIN — HYDROMORPHONE HYDROCHLORIDE 2 MILLIGRAM(S): 2 INJECTION INTRAMUSCULAR; INTRAVENOUS; SUBCUTANEOUS at 22:19

## 2017-04-18 RX ADMIN — Medication 1 GRAM(S): at 06:31

## 2017-04-18 RX ADMIN — LACTULOSE 10 GRAM(S): 10 SOLUTION ORAL at 07:08

## 2017-04-18 RX ADMIN — Medication 10 UNIT(S): at 12:56

## 2017-04-18 RX ADMIN — MIRTAZAPINE 45 MILLIGRAM(S): 45 TABLET, ORALLY DISINTEGRATING ORAL at 22:19

## 2017-04-18 RX ADMIN — BUDESONIDE AND FORMOTEROL FUMARATE DIHYDRATE 1 PUFF(S): 160; 4.5 AEROSOL RESPIRATORY (INHALATION) at 06:13

## 2017-04-18 RX ADMIN — Medication 1 GRAM(S): at 11:37

## 2017-04-18 RX ADMIN — Medication 500000 UNIT(S): at 06:31

## 2017-04-18 RX ADMIN — INSULIN GLARGINE 36 UNIT(S): 100 INJECTION, SOLUTION SUBCUTANEOUS at 22:19

## 2017-04-18 RX ADMIN — Medication 1: at 18:06

## 2017-04-18 RX ADMIN — Medication 5: at 12:55

## 2017-04-18 RX ADMIN — GABAPENTIN 300 MILLIGRAM(S): 400 CAPSULE ORAL at 06:31

## 2017-04-18 RX ADMIN — DULOXETINE HYDROCHLORIDE 60 MILLIGRAM(S): 30 CAPSULE, DELAYED RELEASE ORAL at 06:31

## 2017-04-18 RX ADMIN — LEVALBUTEROL 0.63 MILLIGRAM(S): 1.25 SOLUTION, CONCENTRATE RESPIRATORY (INHALATION) at 10:20

## 2017-04-18 RX ADMIN — HYDROMORPHONE HYDROCHLORIDE 2 MILLIGRAM(S): 2 INJECTION INTRAMUSCULAR; INTRAVENOUS; SUBCUTANEOUS at 18:14

## 2017-04-18 RX ADMIN — GABAPENTIN 300 MILLIGRAM(S): 400 CAPSULE ORAL at 21:27

## 2017-04-18 RX ADMIN — Medication 1: at 22:23

## 2017-04-18 RX ADMIN — TIOTROPIUM BROMIDE 1 CAPSULE(S): 18 CAPSULE ORAL; RESPIRATORY (INHALATION) at 11:15

## 2017-04-18 RX ADMIN — HYDROMORPHONE HYDROCHLORIDE 2 MILLIGRAM(S): 2 INJECTION INTRAMUSCULAR; INTRAVENOUS; SUBCUTANEOUS at 04:32

## 2017-04-18 RX ADMIN — Medication 300 MILLIGRAM(S): at 22:19

## 2017-04-18 RX ADMIN — Medication 1 PACKET(S): at 11:37

## 2017-04-18 RX ADMIN — Medication 600 MILLIGRAM(S): at 10:20

## 2017-04-18 RX ADMIN — CYCLOBENZAPRINE HYDROCHLORIDE 10 MILLIGRAM(S): 10 TABLET, FILM COATED ORAL at 21:27

## 2017-04-18 RX ADMIN — Medication 1 GRAM(S): at 18:08

## 2017-04-18 RX ADMIN — HYDROMORPHONE HYDROCHLORIDE 2 MILLIGRAM(S): 2 INJECTION INTRAMUSCULAR; INTRAVENOUS; SUBCUTANEOUS at 18:46

## 2017-04-18 RX ADMIN — Medication 12 UNIT(S): at 18:07

## 2017-04-18 RX ADMIN — LACTULOSE 10 GRAM(S): 10 SOLUTION ORAL at 12:54

## 2017-04-18 RX ADMIN — BUDESONIDE AND FORMOTEROL FUMARATE DIHYDRATE 1 PUFF(S): 160; 4.5 AEROSOL RESPIRATORY (INHALATION) at 17:57

## 2017-04-18 RX ADMIN — DULOXETINE HYDROCHLORIDE 60 MILLIGRAM(S): 30 CAPSULE, DELAYED RELEASE ORAL at 18:08

## 2017-04-18 RX ADMIN — HYDROMORPHONE HYDROCHLORIDE 2 MILLIGRAM(S): 2 INJECTION INTRAMUSCULAR; INTRAVENOUS; SUBCUTANEOUS at 13:57

## 2017-04-18 RX ADMIN — Medication 5 MILLIGRAM(S): at 07:08

## 2017-04-18 RX ADMIN — HYDROMORPHONE HYDROCHLORIDE 2 MILLIGRAM(S): 2 INJECTION INTRAMUSCULAR; INTRAVENOUS; SUBCUTANEOUS at 14:30

## 2017-04-18 RX ADMIN — HYDROMORPHONE HYDROCHLORIDE 2 MILLIGRAM(S): 2 INJECTION INTRAMUSCULAR; INTRAVENOUS; SUBCUTANEOUS at 09:05

## 2017-04-18 RX ADMIN — CYCLOBENZAPRINE HYDROCHLORIDE 10 MILLIGRAM(S): 10 TABLET, FILM COATED ORAL at 12:52

## 2017-04-18 RX ADMIN — Medication 10 UNIT(S): at 09:03

## 2017-04-18 RX ADMIN — GABAPENTIN 300 MILLIGRAM(S): 400 CAPSULE ORAL at 12:55

## 2017-04-18 RX ADMIN — Medication 2: at 09:02

## 2017-04-18 RX ADMIN — Medication 500000 UNIT(S): at 18:08

## 2017-04-18 RX ADMIN — Medication 100 MILLIGRAM(S): at 06:31

## 2017-04-18 RX ADMIN — POLYETHYLENE GLYCOL 3350 17 GRAM(S): 17 POWDER, FOR SOLUTION ORAL at 06:32

## 2017-04-18 RX ADMIN — Medication 500000 UNIT(S): at 11:36

## 2017-04-18 RX ADMIN — HYDROMORPHONE HYDROCHLORIDE 2 MILLIGRAM(S): 2 INJECTION INTRAMUSCULAR; INTRAVENOUS; SUBCUTANEOUS at 09:30

## 2017-04-18 RX ADMIN — Medication 100 MILLIGRAM(S): at 12:53

## 2017-04-18 RX ADMIN — HYDROMORPHONE HYDROCHLORIDE 2 MILLIGRAM(S): 2 INJECTION INTRAMUSCULAR; INTRAVENOUS; SUBCUTANEOUS at 22:49

## 2017-04-18 RX ADMIN — Medication 1.5 MILLIGRAM(S): at 22:23

## 2017-04-18 RX ADMIN — CYCLOBENZAPRINE HYDROCHLORIDE 10 MILLIGRAM(S): 10 TABLET, FILM COATED ORAL at 06:38

## 2017-04-19 VITALS
DIASTOLIC BLOOD PRESSURE: 85 MMHG | TEMPERATURE: 98 F | SYSTOLIC BLOOD PRESSURE: 133 MMHG | HEART RATE: 105 BPM | RESPIRATION RATE: 18 BRPM

## 2017-04-19 PROCEDURE — 84132 ASSAY OF SERUM POTASSIUM: CPT

## 2017-04-19 PROCEDURE — 82330 ASSAY OF CALCIUM: CPT

## 2017-04-19 PROCEDURE — 84295 ASSAY OF SERUM SODIUM: CPT

## 2017-04-19 PROCEDURE — 84681 ASSAY OF C-PEPTIDE: CPT

## 2017-04-19 PROCEDURE — 94640 AIRWAY INHALATION TREATMENT: CPT

## 2017-04-19 PROCEDURE — 93005 ELECTROCARDIOGRAM TRACING: CPT

## 2017-04-19 PROCEDURE — 87186 SC STD MICRODIL/AGAR DIL: CPT

## 2017-04-19 PROCEDURE — 83880 ASSAY OF NATRIURETIC PEPTIDE: CPT

## 2017-04-19 PROCEDURE — 82435 ASSAY OF BLOOD CHLORIDE: CPT

## 2017-04-19 PROCEDURE — 87086 URINE CULTURE/COLONY COUNT: CPT

## 2017-04-19 PROCEDURE — 83735 ASSAY OF MAGNESIUM: CPT

## 2017-04-19 PROCEDURE — 99232 SBSQ HOSP IP/OBS MODERATE 35: CPT

## 2017-04-19 PROCEDURE — 71045 X-RAY EXAM CHEST 1 VIEW: CPT

## 2017-04-19 PROCEDURE — 81001 URINALYSIS AUTO W/SCOPE: CPT

## 2017-04-19 PROCEDURE — 83605 ASSAY OF LACTIC ACID: CPT

## 2017-04-19 PROCEDURE — 80053 COMPREHEN METABOLIC PANEL: CPT

## 2017-04-19 PROCEDURE — 84484 ASSAY OF TROPONIN QUANT: CPT

## 2017-04-19 PROCEDURE — 84100 ASSAY OF PHOSPHORUS: CPT

## 2017-04-19 PROCEDURE — 82947 ASSAY GLUCOSE BLOOD QUANT: CPT

## 2017-04-19 PROCEDURE — 97161 PT EVAL LOW COMPLEX 20 MIN: CPT

## 2017-04-19 PROCEDURE — 96374 THER/PROPH/DIAG INJ IV PUSH: CPT

## 2017-04-19 PROCEDURE — 76830 TRANSVAGINAL US NON-OB: CPT

## 2017-04-19 PROCEDURE — 80048 BASIC METABOLIC PNL TOTAL CA: CPT

## 2017-04-19 PROCEDURE — 82803 BLOOD GASES ANY COMBINATION: CPT

## 2017-04-19 PROCEDURE — 87040 BLOOD CULTURE FOR BACTERIA: CPT

## 2017-04-19 PROCEDURE — 99285 EMERGENCY DEPT VISIT HI MDM: CPT | Mod: 25

## 2017-04-19 PROCEDURE — 85014 HEMATOCRIT: CPT

## 2017-04-19 PROCEDURE — 85027 COMPLETE CBC AUTOMATED: CPT

## 2017-04-19 RX ORDER — OMEGA-3 ACID ETHYL ESTERS 1 G
0 CAPSULE ORAL
Qty: 0 | Refills: 0 | COMMUNITY

## 2017-04-19 RX ORDER — INSULIN GLARGINE 100 [IU]/ML
20 INJECTION, SOLUTION SUBCUTANEOUS
Qty: 0 | Refills: 0 | COMMUNITY

## 2017-04-19 RX ORDER — INSULIN LISPRO 100/ML
0 VIAL (ML) SUBCUTANEOUS
Qty: 0 | Refills: 0 | COMMUNITY
Start: 2017-04-19

## 2017-04-19 RX ORDER — INSULIN LISPRO 100/ML
12 VIAL (ML) SUBCUTANEOUS
Qty: 0 | Refills: 0 | COMMUNITY
Start: 2017-04-19

## 2017-04-19 RX ORDER — TRAZODONE HCL 50 MG
1 TABLET ORAL
Qty: 0 | Refills: 0 | COMMUNITY
Start: 2017-04-19

## 2017-04-19 RX ORDER — INSULIN LISPRO 100/ML
14 VIAL (ML) SUBCUTANEOUS
Qty: 0 | Refills: 0 | COMMUNITY
Start: 2017-04-19

## 2017-04-19 RX ORDER — DOCUSATE SODIUM 100 MG
1 CAPSULE ORAL
Qty: 0 | Refills: 0 | COMMUNITY
Start: 2017-04-19

## 2017-04-19 RX ORDER — NYSTATIN CREAM 100000 [USP'U]/G
0 CREAM TOPICAL
Qty: 0 | Refills: 0 | COMMUNITY

## 2017-04-19 RX ORDER — POLYETHYLENE GLYCOL 3350 17 G/17G
17 POWDER, FOR SOLUTION ORAL
Qty: 0 | Refills: 0 | COMMUNITY
Start: 2017-04-19

## 2017-04-19 RX ORDER — SENNA PLUS 8.6 MG/1
0 TABLET ORAL
Qty: 0 | Refills: 0 | COMMUNITY

## 2017-04-19 RX ORDER — PSYLLIUM SEED (WITH DEXTROSE)
30 POWDER (GRAM) ORAL
Qty: 0 | Refills: 0 | COMMUNITY
Start: 2017-04-19

## 2017-04-19 RX ORDER — OXYCODONE HYDROCHLORIDE 5 MG/1
1 TABLET ORAL
Qty: 0 | Refills: 0 | COMMUNITY

## 2017-04-19 RX ORDER — DOCUSATE SODIUM 100 MG
3 CAPSULE ORAL
Qty: 0 | Refills: 0 | COMMUNITY
Start: 2017-04-19

## 2017-04-19 RX ORDER — HYDROMORPHONE HYDROCHLORIDE 2 MG/ML
1 INJECTION INTRAMUSCULAR; INTRAVENOUS; SUBCUTANEOUS
Qty: 20 | Refills: 0 | OUTPATIENT
Start: 2017-04-19 | End: 2017-04-24

## 2017-04-19 RX ORDER — TIOTROPIUM BROMIDE 18 UG/1
1 CAPSULE ORAL; RESPIRATORY (INHALATION)
Qty: 0 | Refills: 0 | COMMUNITY
Start: 2017-04-19

## 2017-04-19 RX ORDER — TIOTROPIUM BROMIDE 18 UG/1
0 CAPSULE ORAL; RESPIRATORY (INHALATION)
Qty: 0 | Refills: 0 | COMMUNITY

## 2017-04-19 RX ORDER — PSYLLIUM SEED (WITH DEXTROSE)
1 POWDER (GRAM) ORAL
Qty: 0 | Refills: 0 | COMMUNITY
Start: 2017-04-19

## 2017-04-19 RX ADMIN — Medication 1 GRAM(S): at 00:12

## 2017-04-19 RX ADMIN — Medication 3: at 13:15

## 2017-04-19 RX ADMIN — Medication 1 GRAM(S): at 07:19

## 2017-04-19 RX ADMIN — Medication 5 MILLIGRAM(S): at 07:19

## 2017-04-19 RX ADMIN — Medication 14 UNIT(S): at 09:15

## 2017-04-19 RX ADMIN — GABAPENTIN 300 MILLIGRAM(S): 400 CAPSULE ORAL at 13:37

## 2017-04-19 RX ADMIN — Medication 1 GRAM(S): at 18:00

## 2017-04-19 RX ADMIN — Medication 500000 UNIT(S): at 07:20

## 2017-04-19 RX ADMIN — HYDROMORPHONE HYDROCHLORIDE 2 MILLIGRAM(S): 2 INJECTION INTRAMUSCULAR; INTRAVENOUS; SUBCUTANEOUS at 09:15

## 2017-04-19 RX ADMIN — Medication 1 GRAM(S): at 11:08

## 2017-04-19 RX ADMIN — Medication 2: at 17:59

## 2017-04-19 RX ADMIN — Medication 100 MILLIGRAM(S): at 07:18

## 2017-04-19 RX ADMIN — LACTULOSE 10 GRAM(S): 10 SOLUTION ORAL at 13:32

## 2017-04-19 RX ADMIN — DULOXETINE HYDROCHLORIDE 60 MILLIGRAM(S): 30 CAPSULE, DELAYED RELEASE ORAL at 18:01

## 2017-04-19 RX ADMIN — Medication 500000 UNIT(S): at 18:01

## 2017-04-19 RX ADMIN — HYDROMORPHONE HYDROCHLORIDE 2 MILLIGRAM(S): 2 INJECTION INTRAMUSCULAR; INTRAVENOUS; SUBCUTANEOUS at 18:02

## 2017-04-19 RX ADMIN — BUDESONIDE AND FORMOTEROL FUMARATE DIHYDRATE 1 PUFF(S): 160; 4.5 AEROSOL RESPIRATORY (INHALATION) at 18:24

## 2017-04-19 RX ADMIN — DULOXETINE HYDROCHLORIDE 60 MILLIGRAM(S): 30 CAPSULE, DELAYED RELEASE ORAL at 07:19

## 2017-04-19 RX ADMIN — HYDROMORPHONE HYDROCHLORIDE 2 MILLIGRAM(S): 2 INJECTION INTRAMUSCULAR; INTRAVENOUS; SUBCUTANEOUS at 14:10

## 2017-04-19 RX ADMIN — GABAPENTIN 300 MILLIGRAM(S): 400 CAPSULE ORAL at 07:19

## 2017-04-19 RX ADMIN — TIOTROPIUM BROMIDE 1 CAPSULE(S): 18 CAPSULE ORAL; RESPIRATORY (INHALATION) at 11:11

## 2017-04-19 RX ADMIN — HYDROMORPHONE HYDROCHLORIDE 2 MILLIGRAM(S): 2 INJECTION INTRAMUSCULAR; INTRAVENOUS; SUBCUTANEOUS at 18:30

## 2017-04-19 RX ADMIN — Medication 2: at 09:14

## 2017-04-19 RX ADMIN — Medication 100 MILLIGRAM(S): at 13:33

## 2017-04-19 RX ADMIN — HYDROMORPHONE HYDROCHLORIDE 2 MILLIGRAM(S): 2 INJECTION INTRAMUSCULAR; INTRAVENOUS; SUBCUTANEOUS at 13:35

## 2017-04-19 RX ADMIN — Medication 1 PACKET(S): at 13:32

## 2017-04-19 RX ADMIN — Medication 12 UNIT(S): at 13:15

## 2017-04-19 RX ADMIN — Medication 500000 UNIT(S): at 13:33

## 2017-04-19 RX ADMIN — Medication 50 MICROGRAM(S): at 07:19

## 2017-04-19 RX ADMIN — BUDESONIDE AND FORMOTEROL FUMARATE DIHYDRATE 1 PUFF(S): 160; 4.5 AEROSOL RESPIRATORY (INHALATION) at 06:13

## 2017-04-19 RX ADMIN — HYDROMORPHONE HYDROCHLORIDE 2 MILLIGRAM(S): 2 INJECTION INTRAMUSCULAR; INTRAVENOUS; SUBCUTANEOUS at 10:00

## 2017-04-19 RX ADMIN — Medication 12 UNIT(S): at 17:59

## 2017-04-19 RX ADMIN — CYCLOBENZAPRINE HYDROCHLORIDE 10 MILLIGRAM(S): 10 TABLET, FILM COATED ORAL at 11:07

## 2017-04-19 NOTE — DISCHARGE NOTE ADULT - MEDICATION SUMMARY - MEDICATIONS TO TAKE
I will START or STAY ON the medications listed below when I get home from the hospital:    Sloan-track glaucometer and supplies  -- 1 application subcutaneous once a day  -- Indication: For Diabetes mellitus    HYDROmorphone 2 mg oral tablet  -- 1 tab(s) by mouth every 6 hours, As Needed, Severe Pain (7 - 10) -for severe pain MDD:8mg  -- Indication: For Severe Pain    KlonoPIN 0.5 mg oral tablet  -- 3 tab(s) by mouth once a day (at bedtime)  -- Indication: For anixety    gabapentin 100 mg oral capsule  -- 4 cap(s) by mouth 3 times a day  -- Indication: For Neuropathic pain    Cymbalta 60 mg oral delayed release capsule  --  by mouth 2 times a day  -- Indication: For Neuropathic pain    mirtazapine 45 mg oral tablet  -- 1 tab(s) by mouth once a day (at bedtime)  -- Indication: For insomnia    traZODone 300 mg oral tablet  -- 1 tab(s) by mouth once a day (at bedtime)  -- Indication: For insomnia    insulin lispro 100 units/mL subcutaneous solution  --  subcutaneous 3 times a day (before meals); 1 Unit(s) if Glucose 151 - 200  2 Unit(s) if Glucose 201 - 250  3 Unit(s) if Glucose 251 - 300  4 Unit(s) if Glucose 301 - 350  5 Unit(s) if Glucose 351 - 400  6 Unit(s) if Glucose Greater Than 400  -- Indication: For Diabetes mellitus    insulin lispro 100 units/mL subcutaneous solution  --  subcutaneous once a day (at bedtime); 0 Unit(s) if Glucose 0 - 250  1 Unit(s) if Glucose 251 - 300  2 Unit(s) if Glucose 301 - 350  3 Unit(s) if Glucose 351 - 400  4 Unit(s) if Glucose Greater Than 400  -- Indication: For Diabetes mellitus    insulin lispro 100 units/mL subcutaneous solution  -- 12 unit(s) subcutaneous once a day (before a meal)  -- Indication: For Diabetes mellitus    insulin lispro 100 units/mL subcutaneous solution  -- 14 unit(s) subcutaneous once a day (before a meal)  -- Indication: For Diabetes mellitus    insulin lispro 100 units/mL subcutaneous solution  -- 12 unit(s) subcutaneous once a day (before a meal)  -- Indication: For Diabetes mellitus    Lantus  -- 36 unit(s) subcutaneous once a day (at bedtime)  -- Indication: For Diabetes mellitus    atorvastatin 10 mg oral tablet  -- 1 tab(s) by mouth once a day  -- Indication: For Hyperlipidemia    Symbicort 160 mcg-4.5 mcg/inh inhalation aerosol  -- 1 puff(s) inhaled 2 times a day  -- Check with your doctor before becoming pregnant.  For inhalation only.  Rinse mouth thoroughly after use.    -- Indication: For COPD (chronic obstructive pulmonary disease)    Xopenex HFA CFC free 45 mcg/inh inhalation aerosol  -- 2 puff(s) inhaled every 4 hours PRN  -- Indication: For COPD (chronic obstructive pulmonary disease)    Spiriva  --  inhaled once a day, As Needed  -- Indication: For COPD (chronic obstructive pulmonary disease)    nystatin topical  -- Apply on skin to affected area   -- Indication: For Fungal infection    guaiFENesin 600 mg oral tablet, extended release  -- 1 tab(s) by mouth every 12 hours, As needed, congestion  -- Indication: For Cough    lactulose 10 g/15 mL oral syrup  -- 15 milliliter(s) by mouth every 8 hours  -- Indication: For Cirrhosis    polyethylene glycol 3350 oral powder for reconstitution  -- 17 gram(s) by mouth 2 times a day  -- Indication: For Constipation    psyllium 3.4 g/7 g oral powder for reconstitution  -- 1 dose(s) by mouth once a day  -- Indication: For Constipation    bisacodyl 5 mg oral delayed release tablet  -- 1 tab(s) by mouth every 12 hours  -- Indication: For Constipation    docusate sodium 100 mg oral capsule  -- 1 cap(s) by mouth 3 times a day  -- Indication: For Constipation    Senna  --  by mouth once a day (at bedtime)  -- Indication: For Constipation    montelukast 4 mg oral granule  -- 1 each by mouth once a day  -- Indication: For COPD (chronic obstructive pulmonary disease)    Xifaxan 550 mg oral tablet  -- 1 tab(s) by mouth 2 times a day  -- Indication: For Cirrhosis    sucralfate 1 g oral tablet  -- 1 tab(s) by mouth 4 times a day (before meals and at bedtime)  -- Indication: For abdominal pain    simethicone 80 mg oral tablet, chewable  -- 1 tab(s) by mouth every 6 hours, As needed, Gas  -- Indication: For gas    cyclobenzaprine 10 mg oral tablet  -- 1 tab(s) by mouth 3 times a day, As Needed  -- Indication: For muscle spasm    Omega-3 oral capsule  --  by mouth   -- Indication: For supplement    Synthroid 25 mcg (0.025 mg) oral tablet  -- 2 tab(s) by mouth once a day  -- Indication: For sypothyroidism I will START or STAY ON the medications listed below when I get home from the hospital:    Sloan-track glaucometer and supplies  -- 1 application subcutaneous once a day  -- Indication: For Diabetes mellitus    HYDROmorphone 2 mg oral tablet  -- 1 tab(s) by mouth every 6 hours, As Needed, Severe Pain (7 - 10) -for severe pain MDD:8mg  -- Indication: For Severe Pain    KlonoPIN 0.5 mg oral tablet  -- 3 tab(s) by mouth once a day (at bedtime)  -- Indication: For anixety    gabapentin 100 mg oral capsule  -- 4 cap(s) by mouth 3 times a day  -- Indication: For Neuropathic pain    Cymbalta 60 mg oral delayed release capsule  --  by mouth 2 times a day  -- Indication: For Neuropathic pain    mirtazapine 45 mg oral tablet  -- 1 tab(s) by mouth once a day (at bedtime)  -- Indication: For insomnia    traZODone 300 mg oral tablet  -- 1 tab(s) by mouth once a day (at bedtime)  -- Indication: For insomnia    insulin lispro 100 units/mL subcutaneous solution  -- 12 unit(s) subcutaneous 3 times a day (before meals)  -- Indication: For Diabetes mellitus    Lantus  -- 36 unit(s) subcutaneous once a day (at bedtime)  -- Indication: For Diabetes mellitus    atorvastatin 10 mg oral tablet  -- 1 tab(s) by mouth once a day  -- Indication: For Hyperlipidemia    tiotropium 18 mcg inhalation capsule  -- 1 cap(s) inhaled once a day  -- Indication: For COPD (chronic obstructive pulmonary disease)    Symbicort 160 mcg-4.5 mcg/inh inhalation aerosol  -- 1 puff(s) inhaled 2 times a day  -- Check with your doctor before becoming pregnant.  For inhalation only.  Rinse mouth thoroughly after use.    -- Indication: For COPD (chronic obstructive pulmonary disease)    Xopenex HFA CFC free 45 mcg/inh inhalation aerosol  -- 2 puff(s) inhaled every 4 hours PRN  -- Indication: For COPD (chronic obstructive pulmonary disease)    guaiFENesin 600 mg oral tablet, extended release  -- 1 tab(s) by mouth every 12 hours, As needed, congestion  -- Indication: For Cough    polyethylene glycol 3350 oral powder for reconstitution  -- 17 gram(s) by mouth 2 times a day  -- Indication: For Constipation    psyllium 3.4 g/7 g oral powder for reconstitution  -- 1 dose(s) by mouth once a day  -- Indication: For Constipation    bisacodyl 5 mg oral delayed release tablet  -- 1 tab(s) by mouth every 12 hours  -- Indication: For Constipation    docusate sodium 100 mg oral capsule  -- 1 cap(s) by mouth 3 times a day  -- Indication: For Constipation    lactulose 10 g/15 mL oral syrup  -- 15 milliliter(s) by mouth every 8 hours  -- Indication: For Cirrhosis    Senna  -- 1 tab(s) by mouth once a day (at bedtime)  -- Indication: For Constipation    montelukast 4 mg oral granule  -- 1 each by mouth once a day  -- Indication: For COPD (chronic obstructive pulmonary disease)    Xifaxan 550 mg oral tablet  -- 1 tab(s) by mouth 2 times a day  -- Indication: For Cirrhosis    sucralfate 1 g oral tablet  -- 1 tab(s) by mouth 4 times a day (before meals and at bedtime)  -- Indication: For abdominal pain    simethicone 80 mg oral tablet, chewable  -- 1 tab(s) by mouth every 6 hours, As needed, Gas  -- Indication: For gas    cyclobenzaprine 10 mg oral tablet  -- 1 tab(s) by mouth 3 times a day, As Needed  -- Indication: For muscle spasm    Omega-3 oral capsule  -- 1 cap(s) by mouth once a day  -- Indication: For Hyperlipidemia    Synthroid 25 mcg (0.025 mg) oral tablet  -- 2 tab(s) by mouth once a day  -- Indication: For sypothyroidism

## 2017-04-19 NOTE — DISCHARGE NOTE ADULT - MEDICATION SUMMARY - MEDICATIONS TO STOP TAKING
I will STOP taking the medications listed below when I get home from the hospital:    metformin 1000 mg oral tablet  -- 1 tab(s) by mouth 2 times a day    oxyCODONE 15 mg oral tablet  -- 1 tab(s) by mouth every 6 hours, As Needed

## 2017-04-19 NOTE — DISCHARGE NOTE ADULT - CARE PROVIDER_API CALL
Argentina Baron  [address] [place] [address]  [phone]  Phone: [phone]  Fax: [phone]   )    -    Endocrinology,   [address] [address] [phone]  Phone: [phone]  Fax: (   )    -

## 2017-04-19 NOTE — DISCHARGE NOTE ADULT - HOME CARE AGENCY
[facility] [phone] Visiting nurse, physical therapy eval, home health aide eval, social work eval. Nursing services to begin 4/20/17

## 2017-04-19 NOTE — DISCHARGE NOTE ADULT - ADDITIONAL INSTRUCTIONS
Please follow-up with Endocrine at [address] [place] [address]. Call [phone] to schedule an appointment.

## 2017-04-19 NOTE — DISCHARGE NOTE ADULT - HOSPITAL COURSE
61F PMHx of hypertension, hyperlipidemia, COPD (not on home O2), liver cirrhosis 2/2 AMARO, hypothyroid, anxiety/depression, Lumbar Laminectomy with disk herniation, TIA BIBEMS with hyperglycemia and lbp.  Patient stated that home finger stick was 540. We initially restartedstarted patient on insulin Lantus 30U Qhs with 8U premeal Humalog which was ineffective in controlling her hyperglycemia. We later consulted endocrinology who recommended measuring finger sticks pre-Lactulose for more accurate measurement. Following control of hyperglycemia, patient is discharged on Lantus 36U Qhs, Premeal Humalog 14U breakfast, 12 U lunch, 12 Dinner and ISS  We initially started patient on Morphine 6mg for severe pain and later switched to 2mg PO Dilaudid Q4 hrs. Patient is discharged on 2mg PO Dilaudid Q6 hrs  with strong recommendation to follow-up with her outpatient pain specialist and orthopedic surgeon.   We started bowel regiment with Dulcolax, senna, colace Miralax in addition to patient's home medications for patient's constipation Patient effectively passed a large bowl movement on 4/17 and had softer and more regular BM subsequently. Patient may continue on these medications as needed  Patient complained of vaginal spotting, we obtained a transvaginal ultrasound that was normal.  At discharge, patient is medically stable.    [facility]stop Reference #: [id number]

## 2017-04-19 NOTE — DISCHARGE NOTE ADULT - PLAN OF CARE
Your blood glucoses were high when on admission. Your A1c was 7.9. We consulted Endocrine for assistance management of your hyperglycemia.   You are discharged home to take :  Lantus 36U at bedtime, Pre meal Humalog 14 units before breakfast, 12 units before lunch and 12 units before Dinner and Insulin slinding scale.   The complete instructions are on your modified medication list    Please follow-up with Endocrine at [address] [place] [address]. Call [phone] to schedule an appointment You have a chronic back that we managed with pain medications.  You are discharged on Dilaudid 2mg oral to be taken every 6 hours for sever back pain.  We have also discontinued your oxycodone. A1c <7 Pain < 5 on a 0-10 severity scale You had a BM after we started you on Dulcolax, Senna, Miralax and Colace as needed  -You may continue with the above mentioned medications as needed. at least 1 BM every 2 days

## 2017-04-19 NOTE — DISCHARGE NOTE ADULT - MEDICATION SUMMARY - MEDICATIONS TO CHANGE
I will SWITCH the dose or number of times a day I take the medications listed below when I get home from the hospital:    Lantus  --  subcutaneous    Lantus  -- 20 unit(s) subcutaneous once a day (at bedtime) I will SWITCH the dose or number of times a day I take the medications listed below when I get home from the hospital:    Senna  --  by mouth once a day (at bedtime)    Spiriva  --  inhaled once a day, As Needed    Lantus  --  subcutaneous    Lantus  -- 20 unit(s) subcutaneous once a day (at bedtime)

## 2017-04-19 NOTE — DISCHARGE NOTE ADULT - PATIENT PORTAL LINK FT
“You can access the Follow[facility] Patient Portal, offered by [facility], by registering with the following website: [email or web address]”

## 2017-04-19 NOTE — DISCHARGE NOTE ADULT - CARE PLAN
Principal Discharge DX:	Type 2 diabetes mellitus with hyperglycemia, without long-term current use of insulin  Goal:	A1c <7  Instructions for follow-up, activity and diet:	Your blood glucoses were high when on admission. Your A1c was 7.9. We consulted Endocrine for assistance management of your hyperglycemia.   You are discharged home to take :  Lantus 36U at bedtime, Pre meal Humalog 14 units before breakfast, 12 units before lunch and 12 units before Dinner and Insulin slinding scale.   The complete instructions are on your modified medication list    Please follow-up with Endocrine at [address] [place] [address]. Call [phone] to schedule an appointment  Secondary Diagnosis:	Chronic midline low back pain with bilateral sciatica  Goal:	Pain < 5 on a 0-10 severity scale  Instructions for follow-up, activity and diet:	You have a chronic back that we managed with pain medications.  You are discharged on Dilaudid 2mg oral to be taken every 6 hours for sever back pain.  We have also discontinued your oxycodone.  Secondary Diagnosis:	Drug-induced constipation  Goal:	at least 1 BM every 2 days  Instructions for follow-up, activity and diet:	You had a BM after we started you on Dulcolax, Senna, Miralax and Colace as needed  -You may continue with the above mentioned medications as needed. Principal Discharge DX:	Type 2 diabetes mellitus with hyperglycemia, without long-term current use of insulin  Goal:	A1c <7  Instructions for follow-up, activity and diet:	Your blood glucoses were high when on admission. Your A1c was 7.9. We consulted Endocrine for assistance management of your hyperglycemia.   You are discharged home to take :  Lantus 36U at bedtime, Pre meal Humalog 14 units before breakfast, 12 units before lunch and 12 units before Dinner and Insulin slinding scale.   The complete instructions are on your modified medication list    Please follow-up with Endocrine at [address] [place] [address]. Call [phone] to schedule an appointment  Secondary Diagnosis:	Chronic midline low back pain with bilateral sciatica  Goal:	Pain < 5 on a 0-10 severity scale  Instructions for follow-up, activity and diet:	You have a chronic back that we managed with pain medications.  You are discharged on Dilaudid 2mg oral to be taken every 6 hours for sever back pain.  We have also discontinued your oxycodone.  Secondary Diagnosis:	Drug-induced constipation  Goal:	at least 1 BM every 2 days  Instructions for follow-up, activity and diet:	You had a BM after we started you on Dulcolax, Senna, Miralax and Colace as needed  -You may continue with the above mentioned medications as needed. Principal Discharge DX:	Type 2 diabetes mellitus with hyperglycemia, without long-term current use of insulin  Goal:	A1c <7  Instructions for follow-up, activity and diet:	Your blood glucoses were high when on admission. Your A1c was 7.9. We consulted Endocrine for assistance management of your hyperglycemia.   You are discharged home to take :  Lantus 36U at bedtime, Pre meal Humalog 14 units before breakfast, 12 units before lunch and 12 units before Dinner and Insulin slinding scale.   The complete instructions are on your modified medication list    Please follow-up with Endocrine at [address] [place] [address]. Call [phone] to schedule an appointment  Secondary Diagnosis:	Chronic midline low back pain with bilateral sciatica  Goal:	Pain < 5 on a 0-10 severity scale  Instructions for follow-up, activity and diet:	You have a chronic back that we managed with pain medications.  You are discharged on Dilaudid 2mg oral to be taken every 6 hours for sever back pain.  We have also discontinued your oxycodone.  Secondary Diagnosis:	Drug-induced constipation  Goal:	at least 1 BM every 2 days  Instructions for follow-up, activity and diet:	You had a BM after we started you on Dulcolax, Senna, Miralax and Colace as needed  -You may continue with the above mentioned medications as needed. Principal Discharge DX:	Type 2 diabetes mellitus with hyperglycemia, without long-term current use of insulin  Goal:	A1c <7  Instructions for follow-up, activity and diet:	Your blood glucoses were high when on admission. Your A1c was 7.9. We consulted Endocrine for assistance management of your hyperglycemia.   You are discharged home to take :  Lantus 36U at bedtime, Pre meal Humalog 14 units before breakfast, 12 units before lunch and 12 units before Dinner and Insulin slinding scale.   The complete instructions are on your modified medication list    Please follow-up with Endocrine at [address] [place] [address]. Call [phone] to schedule an appointment  Secondary Diagnosis:	Chronic midline low back pain with bilateral sciatica  Goal:	Pain < 5 on a 0-10 severity scale  Instructions for follow-up, activity and diet:	You have a chronic back that we managed with pain medications.  You are discharged on Dilaudid 2mg oral to be taken every 6 hours for sever back pain.  We have also discontinued your oxycodone.  Secondary Diagnosis:	Drug-induced constipation  Goal:	at least 1 BM every 2 days  Instructions for follow-up, activity and diet:	You had a BM after we started you on Dulcolax, Senna, Miralax and Colace as needed  -You may continue with the above mentioned medications as needed. Principal Discharge DX:	Type 2 diabetes mellitus with hyperglycemia, without long-term current use of insulin  Goal:	A1c <7  Instructions for follow-up, activity and diet:	Your blood glucoses were high when on admission. Your A1c was 7.9. We consulted Endocrine for assistance management of your hyperglycemia.   You are discharged home to take :  Lantus 36U at bedtime, Pre meal Humalog 14 units before breakfast, 12 units before lunch and 12 units before Dinner and Insulin slinding scale.   The complete instructions are on your modified medication list    Please follow-up with Endocrine at [address] [place] [address]. Call [phone] to schedule an appointment  Secondary Diagnosis:	Chronic midline low back pain with bilateral sciatica  Goal:	Pain < 5 on a 0-10 severity scale  Instructions for follow-up, activity and diet:	You have a chronic back that we managed with pain medications.  You are discharged on Dilaudid 2mg oral to be taken every 6 hours for sever back pain.  We have also discontinued your oxycodone.  Secondary Diagnosis:	Drug-induced constipation  Goal:	at least 1 BM every 2 days  Instructions for follow-up, activity and diet:	You had a BM after we started you on Dulcolax, Senna, Miralax and Colace as needed  -You may continue with the above mentioned medications as needed.

## 2017-04-19 NOTE — PHYSICAL THERAPY INITIAL EVALUATION ADULT - ADDITIONAL COMMENTS
Pt states she lives alone in a condo with elevator; no steps to negotiate. Pt has a rollator, wheelchair, cane at home, and states that she has an aide 40hrs/week

## 2017-04-19 NOTE — DISCHARGE NOTE ADULT - PROVIDER TOKENS
FREE:[LAST:[Tod],FIRST:[Argentina],PHONE:[phone]],FAX:[(   )    -],ADDRESS:[[address] [place] [address] [phone]]],FREE:[LAST:[Endocrinology],PHONE:[phone]],FAX:[(   )    -],ADDRESS:[[address] [place] [address] [phone]]]

## 2017-04-25 ENCOUNTER — OTHER (OUTPATIENT)
Age: 62
End: 2017-04-25

## 2017-04-25 ENCOUNTER — RX RENEWAL (OUTPATIENT)
Age: 62
End: 2017-04-25

## 2017-04-25 DIAGNOSIS — M54.9 DORSALGIA, UNSPECIFIED: ICD-10-CM

## 2017-04-25 RX ORDER — CYCLOBENZAPRINE HYDROCHLORIDE 10 MG/1
10 TABLET, FILM COATED ORAL
Qty: 90 | Refills: 2 | Status: ACTIVE | COMMUNITY
Start: 2017-04-25 | End: 1900-01-01

## 2017-04-25 RX ORDER — RIFAXIMIN 550 MG/1
550 TABLET ORAL
Qty: 180 | Refills: 4 | Status: ACTIVE | COMMUNITY
Start: 2017-02-08 | End: 1900-01-01

## 2017-04-26 ENCOUNTER — OTHER (OUTPATIENT)
Age: 62
End: 2017-04-26

## 2017-04-26 RX ORDER — LEVALBUTEROL TARTRATE 45 UG/1
45 AEROSOL, METERED ORAL
Qty: 15 | Refills: 0 | Status: ACTIVE | COMMUNITY
Start: 2017-03-24

## 2017-04-26 RX ORDER — LACTULOSE 10 G/15ML
20 SOLUTION ORAL DAILY
Qty: 1 | Refills: 5 | Status: ACTIVE | COMMUNITY
Start: 2017-04-26 | End: 1900-01-01

## 2017-04-27 ENCOUNTER — EMERGENCY (EMERGENCY)
Facility: HOSPITAL | Age: 62
LOS: 1 days | Discharge: HOME | End: 2017-04-27
Attending: EMERGENCY MEDICINE | Admitting: EMERGENCY MEDICINE
Payer: MEDICARE

## 2017-04-27 VITALS
HEART RATE: 88 BPM | SYSTOLIC BLOOD PRESSURE: 127 MMHG | DIASTOLIC BLOOD PRESSURE: 86 MMHG | TEMPERATURE: 99 F | OXYGEN SATURATION: 95 %

## 2017-04-27 VITALS
HEART RATE: 86 BPM | OXYGEN SATURATION: 98 % | SYSTOLIC BLOOD PRESSURE: 152 MMHG | DIASTOLIC BLOOD PRESSURE: 92 MMHG | RESPIRATION RATE: 20 BRPM | TEMPERATURE: 98 F

## 2017-04-27 DIAGNOSIS — F11.20 OPIOID DEPENDENCE, UNCOMPLICATED: ICD-10-CM

## 2017-04-27 DIAGNOSIS — J44.9 CHRONIC OBSTRUCTIVE PULMONARY DISEASE, UNSPECIFIED: ICD-10-CM

## 2017-04-27 DIAGNOSIS — E11.9 TYPE 2 DIABETES MELLITUS WITHOUT COMPLICATIONS: ICD-10-CM

## 2017-04-27 DIAGNOSIS — Z98.890 OTHER SPECIFIED POSTPROCEDURAL STATES: ICD-10-CM

## 2017-04-27 DIAGNOSIS — F32.9 MAJOR DEPRESSIVE DISORDER, SINGLE EPISODE, UNSPECIFIED: ICD-10-CM

## 2017-04-27 DIAGNOSIS — F41.9 ANXIETY DISORDER, UNSPECIFIED: ICD-10-CM

## 2017-04-27 DIAGNOSIS — Z87.19 PERSONAL HISTORY OF OTHER DISEASES OF THE DIGESTIVE SYSTEM: ICD-10-CM

## 2017-04-27 DIAGNOSIS — R26.2 DIFFICULTY IN WALKING, NOT ELSEWHERE CLASSIFIED: ICD-10-CM

## 2017-04-27 DIAGNOSIS — M54.5 LOW BACK PAIN: ICD-10-CM

## 2017-04-27 DIAGNOSIS — I10 ESSENTIAL (PRIMARY) HYPERTENSION: ICD-10-CM

## 2017-04-27 DIAGNOSIS — J45.909 UNSPECIFIED ASTHMA, UNCOMPLICATED: ICD-10-CM

## 2017-04-27 DIAGNOSIS — Z86.73 PERSONAL HISTORY OF TRANSIENT ISCHEMIC ATTACK (TIA), AND CEREBRAL INFARCTION WITHOUT RESIDUAL DEFICITS: ICD-10-CM

## 2017-04-27 LAB
ALBUMIN SERPL ELPH-MCNC: 3.8 G/DL — SIGNIFICANT CHANGE UP (ref 3.3–5)
ALP SERPL-CCNC: 156 U/L — HIGH (ref 40–120)
ALT FLD-CCNC: 45 U/L RC — SIGNIFICANT CHANGE UP (ref 10–45)
ANION GAP SERPL CALC-SCNC: 12 MMOL/L — SIGNIFICANT CHANGE UP (ref 5–17)
AST SERPL-CCNC: 50 U/L — HIGH (ref 10–40)
BASE EXCESS BLDV CALC-SCNC: 3.1 MMOL/L — HIGH (ref -2–2)
BASOPHILS # BLD AUTO: 0 K/UL — SIGNIFICANT CHANGE UP (ref 0–0.2)
BASOPHILS NFR BLD AUTO: 0.3 % — SIGNIFICANT CHANGE UP (ref 0–2)
BILIRUB SERPL-MCNC: 0.4 MG/DL — SIGNIFICANT CHANGE UP (ref 0.2–1.2)
BUN SERPL-MCNC: 13 MG/DL — SIGNIFICANT CHANGE UP (ref 7–23)
CA-I SERPL-SCNC: 1.27 MMOL/L — SIGNIFICANT CHANGE UP (ref 1.12–1.3)
CALCIUM SERPL-MCNC: 9.6 MG/DL — SIGNIFICANT CHANGE UP (ref 8.4–10.5)
CHLORIDE BLDV-SCNC: 101 MMOL/L — SIGNIFICANT CHANGE UP (ref 96–108)
CHLORIDE SERPL-SCNC: 98 MMOL/L — SIGNIFICANT CHANGE UP (ref 96–108)
CO2 BLDV-SCNC: 29 MMOL/L — SIGNIFICANT CHANGE UP (ref 22–30)
CO2 SERPL-SCNC: 26 MMOL/L — SIGNIFICANT CHANGE UP (ref 22–31)
CREAT SERPL-MCNC: 0.76 MG/DL — SIGNIFICANT CHANGE UP (ref 0.5–1.3)
EOSINOPHIL # BLD AUTO: 0.1 K/UL — SIGNIFICANT CHANGE UP (ref 0–0.5)
EOSINOPHIL NFR BLD AUTO: 3 % — SIGNIFICANT CHANGE UP (ref 0–6)
GAS PNL BLDV: 135 MMOL/L — LOW (ref 136–145)
GAS PNL BLDV: SIGNIFICANT CHANGE UP
GAS PNL BLDV: SIGNIFICANT CHANGE UP
GLUCOSE BLDV-MCNC: 184 MG/DL — HIGH (ref 70–99)
GLUCOSE SERPL-MCNC: 192 MG/DL — HIGH (ref 70–99)
HCO3 BLDV-SCNC: 28 MMOL/L — SIGNIFICANT CHANGE UP (ref 21–29)
HCT VFR BLD CALC: 30.2 % — LOW (ref 34.5–45)
HCT VFR BLDA CALC: 32 % — LOW (ref 39–50)
HGB BLD CALC-MCNC: 10.4 G/DL — LOW (ref 11.5–15.5)
HGB BLD-MCNC: 10.5 G/DL — LOW (ref 11.5–15.5)
HOROWITZ INDEX BLDV+IHG-RTO: SIGNIFICANT CHANGE UP
LACTATE BLDV-MCNC: 1.5 MMOL/L — SIGNIFICANT CHANGE UP (ref 0.7–2)
LYMPHOCYTES # BLD AUTO: 0.9 K/UL — LOW (ref 1–3.3)
LYMPHOCYTES # BLD AUTO: 26.7 % — SIGNIFICANT CHANGE UP (ref 13–44)
MCHC RBC-ENTMCNC: 28.8 PG — SIGNIFICANT CHANGE UP (ref 27–34)
MCHC RBC-ENTMCNC: 34.7 GM/DL — SIGNIFICANT CHANGE UP (ref 32–36)
MCV RBC AUTO: 83.2 FL — SIGNIFICANT CHANGE UP (ref 80–100)
MONOCYTES # BLD AUTO: 0.4 K/UL — SIGNIFICANT CHANGE UP (ref 0–0.9)
MONOCYTES NFR BLD AUTO: 11.9 % — SIGNIFICANT CHANGE UP (ref 2–14)
NEUTROPHILS # BLD AUTO: 2 K/UL — SIGNIFICANT CHANGE UP (ref 1.8–7.4)
NEUTROPHILS NFR BLD AUTO: 58.1 % — SIGNIFICANT CHANGE UP (ref 43–77)
PCO2 BLDV: 45 MMHG — SIGNIFICANT CHANGE UP (ref 35–50)
PH BLDV: 7.41 — SIGNIFICANT CHANGE UP (ref 7.35–7.45)
PLAT MORPH BLD: NORMAL — SIGNIFICANT CHANGE UP
PLATELET # BLD AUTO: 95 K/UL — LOW (ref 150–400)
PO2 BLDV: 49 MMHG — HIGH (ref 25–45)
POTASSIUM BLDV-SCNC: 4.4 MMOL/L — SIGNIFICANT CHANGE UP (ref 3.5–5)
POTASSIUM SERPL-MCNC: 4.7 MMOL/L — SIGNIFICANT CHANGE UP (ref 3.5–5.3)
POTASSIUM SERPL-SCNC: 4.7 MMOL/L — SIGNIFICANT CHANGE UP (ref 3.5–5.3)
PROT SERPL-MCNC: 7.5 G/DL — SIGNIFICANT CHANGE UP (ref 6–8.3)
RBC # BLD: 3.63 M/UL — LOW (ref 3.8–5.2)
RBC # FLD: 14 % — SIGNIFICANT CHANGE UP (ref 10.3–14.5)
RBC BLD AUTO: SIGNIFICANT CHANGE UP
SAO2 % BLDV: 81 % — SIGNIFICANT CHANGE UP (ref 67–88)
SODIUM SERPL-SCNC: 136 MMOL/L — SIGNIFICANT CHANGE UP (ref 135–145)
WBC # BLD: 3.4 K/UL — LOW (ref 3.8–10.5)
WBC # FLD AUTO: 3.4 K/UL — LOW (ref 3.8–10.5)

## 2017-04-27 PROCEDURE — 80053 COMPREHEN METABOLIC PANEL: CPT

## 2017-04-27 PROCEDURE — 99284 EMERGENCY DEPT VISIT MOD MDM: CPT | Mod: 25

## 2017-04-27 PROCEDURE — 82330 ASSAY OF CALCIUM: CPT

## 2017-04-27 PROCEDURE — 96375 TX/PRO/DX INJ NEW DRUG ADDON: CPT

## 2017-04-27 PROCEDURE — 84132 ASSAY OF SERUM POTASSIUM: CPT

## 2017-04-27 PROCEDURE — 82803 BLOOD GASES ANY COMBINATION: CPT

## 2017-04-27 PROCEDURE — 82435 ASSAY OF BLOOD CHLORIDE: CPT

## 2017-04-27 PROCEDURE — 84295 ASSAY OF SERUM SODIUM: CPT

## 2017-04-27 PROCEDURE — 82947 ASSAY GLUCOSE BLOOD QUANT: CPT

## 2017-04-27 PROCEDURE — 83605 ASSAY OF LACTIC ACID: CPT

## 2017-04-27 PROCEDURE — 99284 EMERGENCY DEPT VISIT MOD MDM: CPT

## 2017-04-27 PROCEDURE — 85027 COMPLETE CBC AUTOMATED: CPT

## 2017-04-27 PROCEDURE — 82550 ASSAY OF CK (CPK): CPT

## 2017-04-27 PROCEDURE — 96374 THER/PROPH/DIAG INJ IV PUSH: CPT

## 2017-04-27 PROCEDURE — 85014 HEMATOCRIT: CPT

## 2017-04-27 RX ORDER — ACETAMINOPHEN 500 MG
1000 TABLET ORAL ONCE
Qty: 0 | Refills: 0 | Status: COMPLETED | OUTPATIENT
Start: 2017-04-27 | End: 2017-04-27

## 2017-04-27 RX ORDER — LIDOCAINE 4 G/100G
1 CREAM TOPICAL ONCE
Qty: 0 | Refills: 0 | Status: COMPLETED | OUTPATIENT
Start: 2017-04-27 | End: 2017-04-27

## 2017-04-27 RX ORDER — KETOROLAC TROMETHAMINE 30 MG/ML
30 SYRINGE (ML) INJECTION ONCE
Qty: 0 | Refills: 0 | Status: DISCONTINUED | OUTPATIENT
Start: 2017-04-27 | End: 2017-04-27

## 2017-04-27 RX ADMIN — LIDOCAINE 1 PATCH: 4 CREAM TOPICAL at 19:27

## 2017-04-27 RX ADMIN — Medication 30 MILLIGRAM(S): at 17:50

## 2017-04-27 RX ADMIN — Medication 400 MILLIGRAM(S): at 19:25

## 2017-04-27 NOTE — ED PROVIDER NOTE - OBJECTIVE STATEMENT
61F PMHx HTN, HLD, Bronchitis, liver cirrhosis 2/2 AMARO, hypothyroidism, anxiety/depression, insomnia, Lumbar Laminectomy with disk herniation, TIA BIBEMS for intractable lower back pain. Patient was recently admitted to the hospital for hyperglycemia and lower back pain, was discharged approximately 1 week ago. Since discharge she has been unable to properly control her pain with Oxycodone IR 15mg and Hydromorphone 4-8mg PO. Reports difficulty ambulating and states she needs her pain to be controlled, states she "needs 4mg IV push Dilaudid." Denies bladder/bowel incontinence, numbness/tingling distally.     hyperglycemia in 500s and lower back pain. Patient states that her blood glucose has been poorly controlled due to steroid use. Patient is currently on Lantus 20 , Humalog 8 U pre meal, and SSI.   Patient's pain medicine MD started a trial of steroids for her lower back pain but pt developed facial swelling with dexamethasone, and solumedrol. Patient also complained of frontal periorbital headache that started last night associated with double vision and photophobia. Patient has a remote history of migraines but has not had an episode in since she was "young". Pt denies loss of vision, rhinorrhea, lacrimation.

## 2017-04-27 NOTE — ED PROVIDER NOTE - CARE PLAN
Principal Discharge DX:	Back pain  Instructions for follow-up, activity and diet:	1. You may continue to take your previously prescribed pain medications as directed.   2. Follow up with your Primary Care Physician and Pain Management as soon as possible for further evaluation.   3. Return to the Emergency Department for any concerning symptoms. Principal Discharge DX:	Back pain  Instructions for follow-up, activity and diet:	1. You may continue to take your previously prescribed pain medications as directed.   2. Follow up with your Primary Care Physician and Pain Management as soon as possible for further evaluation.   3. Return to the Emergency Department for any concerning symptoms.  Secondary Diagnosis:	Anxiety  Secondary Diagnosis:	Narcotic addiction

## 2017-04-27 NOTE — ED PROVIDER NOTE - PROGRESS NOTE DETAILS
Patient was able to get up and walk to the bathroom with minimal assistance. Tyson Manuel PA-C. Attending Cabello: pt ambulating to the bathroom without difficulty Attending Cabello: d/w hospitalist who knows pt well. pt continually requesting IV narcotics. concern for seeking behavior. d/w hospitalist agrees with no iv narcotics and management with tyleonol and motrin. pt comforatble in bed eating a sandwich Dr. Cabello and internal medicine residents spoke with patient, informed her that she will not be getting IV narcotics in the hospital and that she is cleared for discharged. She will attempt to arrange a ride home. Tyson Manuel PA-C.

## 2017-04-27 NOTE — ED ADULT NURSE NOTE - OBJECTIVE STATEMENT
Pt bib EMS for eval of worsening chronic back pain and increasing difficulty in walking or performing her ADLs.  She had difficulty sitting forward in bed, even with assistance.  She was given 4mg morphine by EMS without relief of pain.

## 2017-04-27 NOTE — ED ADULT NURSE NOTE - PMH
Anemia    Anxiety    Anxiety and depression    Asthma    Chronic sinusitis    Cirrhosis    Constipation, chronic    COPD (chronic obstructive pulmonary disease)  no recent exacerb  Diabetes mellitus  type 2  Fall at home    Fatty liver    GERD (gastroesophageal reflux disease)    Hyperlipidemia    Hypertension    Lung nodules    Syncope and collapse  10/2016 with long hospital stay at [facility], with negative cardiac work up as per patient, s/p angiogram too with no blockages  as per patient. + right arm nerve damages.  Thrombocytopenia    TIA (transient ischemic attack)  10/2016 on ASA and Plavix as per Dr. Perez

## 2017-04-27 NOTE — ED PROVIDER NOTE - NEUROLOGICAL, MLM
Alert and oriented, no focal deficits, no motor or sensory deficits. pt able to ambulate, sensation intact, normal patellar reflees

## 2017-04-27 NOTE — ED ADULT NURSE REASSESSMENT NOTE - NS ED NURSE REASSESS COMMENT FT1
@ 1945 pt sated she wanted to leave, that the meds did not help her and that she would not come back to this hospital again.   She has been ambulatory in the ED both with and without assistance.  Dinner tray provided.

## 2017-04-27 NOTE — ED PROVIDER NOTE - PLAN OF CARE
1. You may continue to take your previously prescribed pain medications as directed.   2. Follow up with your Primary Care Physician and Pain Management as soon as possible for further evaluation.   3. Return to the Emergency Department for any concerning symptoms.

## 2017-04-27 NOTE — ED PROVIDER NOTE - MEDICAL DECISION MAKING DETAILS
Attending Cabello: 60 y/o female h/o chronic lower back pain with recent admission to the hospital presenting with lower back pain. no focal neurologic deficit and pt able to ambulate making spinal cord involvement less likely. reviewed old charts, consider for opiod addiction. pt given non narcotic medications. d/w hospitalist, who agree pt should not receive opiod medications and needs to follow up with pain management and physicial therapy. plan to d/c

## 2017-05-16 ENCOUNTER — APPOINTMENT (OUTPATIENT)
Dept: GASTROENTEROLOGY | Facility: HOSPITAL | Age: 62
End: 2017-05-16

## 2017-05-16 ENCOUNTER — OTHER (OUTPATIENT)
Age: 62
End: 2017-05-16

## 2017-05-22 ENCOUNTER — APPOINTMENT (OUTPATIENT)
Dept: CHRONIC DISEASE MANAGEMENT | Facility: CLINIC | Age: 62
End: 2017-05-22

## 2017-06-06 ENCOUNTER — OTHER (OUTPATIENT)
Age: 62
End: 2017-06-06

## 2017-06-06 RX ORDER — SUCRALFATE 1 G/1
1 TABLET ORAL
Qty: 120 | Refills: 1 | Status: ACTIVE | COMMUNITY
Start: 2017-06-06 | End: 1900-01-01

## 2017-06-27 ENCOUNTER — APPOINTMENT (OUTPATIENT)
Dept: NEUROLOGY | Facility: CLINIC | Age: 62
End: 2017-06-27

## 2017-07-12 ENCOUNTER — OTHER (OUTPATIENT)
Age: 62
End: 2017-07-12

## 2017-07-12 DIAGNOSIS — R53.83 OTHER FATIGUE: ICD-10-CM

## 2017-07-12 RX ORDER — OMEPRAZOLE 20 MG/1
20 CAPSULE, DELAYED RELEASE ORAL DAILY
Qty: 60 | Refills: 1 | Status: ACTIVE | COMMUNITY
Start: 2017-07-12 | End: 1900-01-01

## 2017-07-20 ENCOUNTER — RESULT REVIEW (OUTPATIENT)
Age: 62
End: 2017-07-20

## 2017-07-27 ENCOUNTER — INPATIENT (INPATIENT)
Facility: HOSPITAL | Age: 62
LOS: 4 days | Discharge: HOME | DRG: 378 | End: 2017-08-01
Attending: INTERNAL MEDICINE | Admitting: INTERNAL MEDICINE
Payer: MEDICARE

## 2017-07-27 VITALS
OXYGEN SATURATION: 96 % | HEART RATE: 100 BPM | DIASTOLIC BLOOD PRESSURE: 76 MMHG | SYSTOLIC BLOOD PRESSURE: 118 MMHG | RESPIRATION RATE: 15 BRPM

## 2017-07-27 DIAGNOSIS — K92.1 MELENA: ICD-10-CM

## 2017-07-27 LAB
ALBUMIN SERPL ELPH-MCNC: 3.8 G/DL — SIGNIFICANT CHANGE UP (ref 3.3–5)
ALP SERPL-CCNC: 174 U/L — HIGH (ref 40–120)
ALT FLD-CCNC: 33 U/L RC — SIGNIFICANT CHANGE UP (ref 10–45)
ANION GAP SERPL CALC-SCNC: 12 MMOL/L — SIGNIFICANT CHANGE UP (ref 5–17)
APTT BLD: 26.7 SEC — LOW (ref 27.5–37.4)
AST SERPL-CCNC: 47 U/L — HIGH (ref 10–40)
BASE EXCESS BLDV CALC-SCNC: 0.9 MMOL/L — SIGNIFICANT CHANGE UP (ref -2–2)
BASOPHILS # BLD AUTO: 0.1 K/UL — SIGNIFICANT CHANGE UP (ref 0–0.2)
BASOPHILS NFR BLD AUTO: 2.3 % — HIGH (ref 0–2)
BILIRUB SERPL-MCNC: 0.5 MG/DL — SIGNIFICANT CHANGE UP (ref 0.2–1.2)
BLD GP AB SCN SERPL QL: NEGATIVE — SIGNIFICANT CHANGE UP
BUN SERPL-MCNC: 13 MG/DL — SIGNIFICANT CHANGE UP (ref 7–23)
CA-I SERPL-SCNC: 1.26 MMOL/L — SIGNIFICANT CHANGE UP (ref 1.12–1.3)
CALCIUM SERPL-MCNC: 9.4 MG/DL — SIGNIFICANT CHANGE UP (ref 8.4–10.5)
CHLORIDE BLDV-SCNC: 99 MMOL/L — SIGNIFICANT CHANGE UP (ref 96–108)
CHLORIDE SERPL-SCNC: 97 MMOL/L — SIGNIFICANT CHANGE UP (ref 96–108)
CO2 BLDV-SCNC: 30 MMOL/L — SIGNIFICANT CHANGE UP (ref 22–30)
CO2 SERPL-SCNC: 25 MMOL/L — SIGNIFICANT CHANGE UP (ref 22–31)
CREAT SERPL-MCNC: 0.88 MG/DL — SIGNIFICANT CHANGE UP (ref 0.5–1.3)
EOSINOPHIL # BLD AUTO: 0.1 K/UL — SIGNIFICANT CHANGE UP (ref 0–0.5)
EOSINOPHIL NFR BLD AUTO: 3.1 % — SIGNIFICANT CHANGE UP (ref 0–6)
GAS PNL BLDV: 135 MMOL/L — LOW (ref 136–145)
GAS PNL BLDV: SIGNIFICANT CHANGE UP
GAS PNL BLDV: SIGNIFICANT CHANGE UP
GLUCOSE BLDV-MCNC: 189 MG/DL — HIGH (ref 70–99)
GLUCOSE SERPL-MCNC: 190 MG/DL — HIGH (ref 70–99)
HCO3 BLDV-SCNC: 28 MMOL/L — SIGNIFICANT CHANGE UP (ref 21–29)
HCT VFR BLD CALC: 26 % — LOW (ref 34.5–45)
HCT VFR BLDA CALC: 33 % — LOW (ref 39–50)
HGB BLD CALC-MCNC: 10.6 G/DL — LOW (ref 11.5–15.5)
HGB BLD-MCNC: 8.5 G/DL — LOW (ref 11.5–15.5)
INR BLD: 1.18 RATIO — HIGH (ref 0.88–1.16)
LACTATE BLDV-MCNC: 1.2 MMOL/L — SIGNIFICANT CHANGE UP (ref 0.7–2)
LYMPHOCYTES # BLD AUTO: 1 K/UL — SIGNIFICANT CHANGE UP (ref 1–3.3)
LYMPHOCYTES # BLD AUTO: 26.6 % — SIGNIFICANT CHANGE UP (ref 13–44)
MCHC RBC-ENTMCNC: 26.3 PG — LOW (ref 27–34)
MCHC RBC-ENTMCNC: 32.7 GM/DL — SIGNIFICANT CHANGE UP (ref 32–36)
MCV RBC AUTO: 80.4 FL — SIGNIFICANT CHANGE UP (ref 80–100)
MONOCYTES # BLD AUTO: 0.4 K/UL — SIGNIFICANT CHANGE UP (ref 0–0.9)
MONOCYTES NFR BLD AUTO: 11.1 % — SIGNIFICANT CHANGE UP (ref 2–14)
NEUTROPHILS # BLD AUTO: 2.2 K/UL — SIGNIFICANT CHANGE UP (ref 1.8–7.4)
NEUTROPHILS NFR BLD AUTO: 57 % — SIGNIFICANT CHANGE UP (ref 43–77)
OTHER CELLS CSF MANUAL: 11 ML/DL — LOW (ref 18–22)
PCO2 BLDV: 58 MMHG — HIGH (ref 35–50)
PH BLDV: 7.3 — LOW (ref 7.35–7.45)
PLATELET # BLD AUTO: 81 K/UL — LOW (ref 150–400)
PO2 BLDV: 52 MMHG — HIGH (ref 25–45)
POTASSIUM BLDV-SCNC: 3.9 MMOL/L — SIGNIFICANT CHANGE UP (ref 3.5–5)
POTASSIUM SERPL-MCNC: 3.9 MMOL/L — SIGNIFICANT CHANGE UP (ref 3.5–5.3)
POTASSIUM SERPL-SCNC: 3.9 MMOL/L — SIGNIFICANT CHANGE UP (ref 3.5–5.3)
PROT SERPL-MCNC: 8.2 G/DL — SIGNIFICANT CHANGE UP (ref 6–8.3)
PROTHROM AB SERPL-ACNC: 12.8 SEC — HIGH (ref 9.8–12.7)
RBC # BLD: 3.23 M/UL — LOW (ref 3.8–5.2)
RBC # FLD: 15.4 % — HIGH (ref 10.3–14.5)
RH IG SCN BLD-IMP: NEGATIVE — SIGNIFICANT CHANGE UP
SAO2 % BLDV: 75 % — SIGNIFICANT CHANGE UP (ref 67–88)
SODIUM SERPL-SCNC: 134 MMOL/L — LOW (ref 135–145)
WBC # BLD: 3.9 K/UL — SIGNIFICANT CHANGE UP (ref 3.8–10.5)
WBC # FLD AUTO: 3.9 K/UL — SIGNIFICANT CHANGE UP (ref 3.8–10.5)

## 2017-07-27 PROCEDURE — 99285 EMERGENCY DEPT VISIT HI MDM: CPT

## 2017-07-27 PROCEDURE — 99223 1ST HOSP IP/OBS HIGH 75: CPT

## 2017-07-27 RX ORDER — PANTOPRAZOLE SODIUM 20 MG/1
80 TABLET, DELAYED RELEASE ORAL ONCE
Qty: 0 | Refills: 0 | Status: COMPLETED | OUTPATIENT
Start: 2017-07-27 | End: 2017-07-27

## 2017-07-27 RX ORDER — PANTOPRAZOLE SODIUM 20 MG/1
40 TABLET, DELAYED RELEASE ORAL
Qty: 0 | Refills: 0 | Status: DISCONTINUED | OUTPATIENT
Start: 2017-07-28 | End: 2017-08-01

## 2017-07-27 RX ORDER — SODIUM CHLORIDE 9 MG/ML
1000 INJECTION INTRAMUSCULAR; INTRAVENOUS; SUBCUTANEOUS ONCE
Qty: 0 | Refills: 0 | Status: COMPLETED | OUTPATIENT
Start: 2017-07-27 | End: 2017-07-27

## 2017-07-27 RX ORDER — SODIUM CHLORIDE 9 MG/ML
3 INJECTION INTRAMUSCULAR; INTRAVENOUS; SUBCUTANEOUS ONCE
Qty: 0 | Refills: 0 | Status: COMPLETED | OUTPATIENT
Start: 2017-07-27 | End: 2017-07-27

## 2017-07-27 RX ADMIN — SODIUM CHLORIDE 3 MILLILITER(S): 9 INJECTION INTRAMUSCULAR; INTRAVENOUS; SUBCUTANEOUS at 21:42

## 2017-07-27 RX ADMIN — PANTOPRAZOLE SODIUM 80 MILLIGRAM(S): 20 TABLET, DELAYED RELEASE ORAL at 21:52

## 2017-07-27 RX ADMIN — SODIUM CHLORIDE 1000 MILLILITER(S): 9 INJECTION INTRAMUSCULAR; INTRAVENOUS; SUBCUTANEOUS at 21:52

## 2017-07-27 NOTE — ED PROVIDER NOTE - ATTENDING CONTRIBUTION TO CARE
Patient with history of liver disease presenting with multiple days of black tarry stools.  No pain in abdomen.  Discussed with her GI who sent her to the ED for evaluation.  No history of varices, no history of prior GIB, last endoscopy 3 years ago.  Denying CP/SOB but having decreased energy.  No blood thinners.    On exam patient non toxic appearing, vital signs within normal limits, RRR S1/S2, lungs clear to ascultation bilaterally, abdomen soft, non tender, non distended.    Concerned for GIB in setting of liver disease, plan for labs, T&S, CXR, EKG, admission for hemoglobin monitoring and GI consultation.

## 2017-07-27 NOTE — ED PROVIDER NOTE - OBJECTIVE STATEMENT
Patient is a 62 y/o F w/ a PMHx of HTN, HLD, Bronchitis, liver cirrhosis 2/2 AMARO, hypothyroidism, anxiety/depression, insomnia, Lumbar Laminectomy with disk herniation, and TIA who presents to the ED with melena. Patient reports that she started to develop black stool 4 days ago which has continued until today. She also reports progressively worsening fatigue. Denies taking any blood thinners or recent NSAID use. She reports that she spoke to her GI doctor who advised her to come to the ED. Patient endorses subjective chills, but no fever, chest pain, shortness of breath, nausea, vomiting, or abdominal pain. She reports that she had an endoscopy/colonoscopy a few years ago and a few polyps were seen. No esophageal varices were noted.

## 2017-07-27 NOTE — ED ADULT NURSE NOTE - OBJECTIVE STATEMENT
61yr female presented to ED by EMS c/o GI bleed. Pt reports black tarry stool since Sunday, spoke to GI MD today and was told to come to ED.  Pt presents a&ox4, reports being tired, pt states "I feel wiped out," reports 3 episodes of dark tarry stool today, and has had them since Sunday, denies any abdominal pain, no sob, no dizziness, no lightheadedness, no chest pain, no urinary symptoms, abdomin soft non distended, non tender to the touch, bowel sounds present x4, skin warm dry & intact.

## 2017-07-27 NOTE — ED PROVIDER NOTE - PMH
Anemia    Anxiety    Anxiety and depression    Asthma    Chronic sinusitis    Cirrhosis    Constipation, chronic    COPD (chronic obstructive pulmonary disease)  no recent exacerb  Diabetes mellitus  type 2  Fall at home    Fatty liver    GERD (gastroesophageal reflux disease)    Hyperlipidemia    Hypertension    Lung nodules    Syncope and collapse  10/2016 with long hospital stay at [facility], with negative cardiac work up as per patient, s/p angiogram too with no blockages  as per patient. + right arm nerve damages.  Thrombocytopenia    TIA (transient ischemic attack)  10/2016 on ASA and Plavix as per Dr. Perez

## 2017-07-27 NOTE — ED PROVIDER NOTE - NS ED ROS FT
REVIEW OF SYSTEMS  General: +Fatigue, No fever or chills	  Skin/Breast: No itching or rash  Ophthalmologic: No eye pain or lacrimation  ENMT: No nasal congestion or rhinorrhea  Respiratory and Thorax: No shortness of breath or cough  Cardiovascular: No chest pain or palpitations  Gastrointestinal: + Melena, No nausea, vomiting, or abdominal pain  Musculoskeletal: + Chronic back pain, No joint erythema  Neurological: No headache or syncope

## 2017-07-27 NOTE — ED PROVIDER NOTE - MEDICAL DECISION MAKING DETAILS
62 y/o F w/ a PMHx of HTN, HLD, Bronchitis, liver cirrhosis 2/2 AMARO, hypothyroidism, anxiety/depression, insomnia, Lumbar Laminectomy with disk herniation, and TIA who presents to the ED with likely UGIB. Will check CBC/CMP/Coags/T+S. Will notify GI. Likely admit. Plan for IVF/Protonix.

## 2017-07-27 NOTE — ED PROVIDER NOTE - PHYSICAL EXAMINATION
PHYSICAL EXAM:  Constitutional: Appears tired  Eyes: PERRLA, Conjunctival pallor  ENMT: Dry mucous membranes  Neck: Supple    Back:  Respiratory:  Cardiovascular:  Gastrointestinal:  Genitourinary:  Rectal:  Extremities:  Vascular:  Neurological:  Skin:  Lymph Nodes:  Musculoskeletal:  Psychiatric: PHYSICAL EXAM:  Constitutional: Appears tired  Eyes: PERRLA, Conjunctival pallor  ENMT: Dry mucous membranes  Neck: Supple  Respiratory: CTAB; No wheeze  Cardiovascular: RRR; +S1/S2  Gastrointestinal: +BS, Soft, NT.   Genitourinary:  Rectal:  Extremities:  Vascular:  Neurological:  Skin:  Lymph Nodes:  Musculoskeletal:  Psychiatric: PHYSICAL EXAM:  Constitutional: Appears tired  Eyes: PERRLA, Conjunctival pallor  ENMT: Dry mucous membranes, Poor dentition  Neck: Supple  Respiratory: CTAB; No wheeze  Cardiovascular: RRR; +S1/S2  Gastrointestinal: +BS, Soft, NT  Rectal: Minimal stool seen at vault, Poor sample obtained for guaiac test  Extremities: Mild pedal edema  Neurological: A+Ox3, Tired, but arousable, Follows commands  Skin: Warm and dry  Psychiatric: Calm

## 2017-07-27 NOTE — ED ADULT NURSE NOTE - PMH
Anemia    Anxiety    Anxiety and depression    Asthma    Chronic sinusitis    Cirrhosis    Constipation, chronic    COPD (chronic obstructive pulmonary disease)  no recent exacerb  Diabetes mellitus  type 2  Fall at home    Fatty liver    GERD (gastroesophageal reflux disease)    Hyperlipidemia    Hypertension    Lung nodules    Syncope and collapse  10/2016 with long hospital stay at [facility], with negative cardiac work up as per patient, s/p angiogram too with no blockages  as per patient. + right arm nerve damages.  Thrombocytopenia    TIA (transient ischemic attack)  10/2016 on ASA and Plavix as per Dr. Perez

## 2017-07-28 DIAGNOSIS — R00.0 TACHYCARDIA, UNSPECIFIED: ICD-10-CM

## 2017-07-28 DIAGNOSIS — E11.9 TYPE 2 DIABETES MELLITUS WITHOUT COMPLICATIONS: ICD-10-CM

## 2017-07-28 DIAGNOSIS — D50.0 IRON DEFICIENCY ANEMIA SECONDARY TO BLOOD LOSS (CHRONIC): ICD-10-CM

## 2017-07-28 DIAGNOSIS — R19.7 DIARRHEA, UNSPECIFIED: ICD-10-CM

## 2017-07-28 DIAGNOSIS — Z79.899 OTHER LONG TERM (CURRENT) DRUG THERAPY: ICD-10-CM

## 2017-07-28 DIAGNOSIS — R52 PAIN, UNSPECIFIED: ICD-10-CM

## 2017-07-28 DIAGNOSIS — K74.60 UNSPECIFIED CIRRHOSIS OF LIVER: ICD-10-CM

## 2017-07-28 DIAGNOSIS — R54 AGE-RELATED PHYSICAL DEBILITY: ICD-10-CM

## 2017-07-28 LAB
HCT VFR BLD CALC: 31.6 % — LOW (ref 34.5–45)
HCT VFR BLD CALC: 33.7 % — LOW (ref 34.5–45)
HCT VFR BLD CALC: 34.8 % — SIGNIFICANT CHANGE UP (ref 34.5–45)
HCT VFR BLD CALC: 38.5 % — SIGNIFICANT CHANGE UP (ref 34.5–45)
HGB BLD-MCNC: 10.5 G/DL — LOW (ref 11.5–15.5)
HGB BLD-MCNC: 11.2 G/DL — LOW (ref 11.5–15.5)
HGB BLD-MCNC: 12.2 G/DL — SIGNIFICANT CHANGE UP (ref 11.5–15.5)
HGB BLD-MCNC: 9.9 G/DL — LOW (ref 11.5–15.5)
MCHC RBC-ENTMCNC: 25.1 PG — LOW (ref 27–34)
MCHC RBC-ENTMCNC: 25.3 PG — LOW (ref 27–34)
MCHC RBC-ENTMCNC: 25.8 PG — LOW (ref 27–34)
MCHC RBC-ENTMCNC: 25.9 PG — LOW (ref 27–34)
MCHC RBC-ENTMCNC: 31.2 GM/DL — LOW (ref 32–36)
MCHC RBC-ENTMCNC: 31.3 GM/DL — LOW (ref 32–36)
MCHC RBC-ENTMCNC: 31.7 GM/DL — LOW (ref 32–36)
MCHC RBC-ENTMCNC: 32.2 GM/DL — SIGNIFICANT CHANGE UP (ref 32–36)
MCV RBC AUTO: 80.3 FL — SIGNIFICANT CHANGE UP (ref 80–100)
MCV RBC AUTO: 80.3 FL — SIGNIFICANT CHANGE UP (ref 80–100)
MCV RBC AUTO: 81.2 FL — SIGNIFICANT CHANGE UP (ref 80–100)
MCV RBC AUTO: 81.6 FL — SIGNIFICANT CHANGE UP (ref 80–100)
PLATELET # BLD AUTO: 106 K/UL — LOW (ref 150–400)
PLATELET # BLD AUTO: 118 K/UL — LOW (ref 150–400)
PLATELET # BLD AUTO: 89 K/UL — LOW (ref 150–400)
PLATELET # BLD AUTO: 93 K/UL — LOW (ref 150–400)
RBC # BLD: 3.93 M/UL — SIGNIFICANT CHANGE UP (ref 3.8–5.2)
RBC # BLD: 4.16 M/UL — SIGNIFICANT CHANGE UP (ref 3.8–5.2)
RBC # BLD: 4.34 M/UL — SIGNIFICANT CHANGE UP (ref 3.8–5.2)
RBC # BLD: 4.72 M/UL — SIGNIFICANT CHANGE UP (ref 3.8–5.2)
RBC # FLD: 15.6 % — HIGH (ref 10.3–14.5)
RBC # FLD: 15.6 % — HIGH (ref 10.3–14.5)
RBC # FLD: 15.7 % — HIGH (ref 10.3–14.5)
RBC # FLD: 15.7 % — HIGH (ref 10.3–14.5)
WBC # BLD: 3.9 K/UL — SIGNIFICANT CHANGE UP (ref 3.8–10.5)
WBC # BLD: 4.6 K/UL — SIGNIFICANT CHANGE UP (ref 3.8–10.5)
WBC # BLD: 5.3 K/UL — SIGNIFICANT CHANGE UP (ref 3.8–10.5)
WBC # BLD: 7.4 K/UL — SIGNIFICANT CHANGE UP (ref 3.8–10.5)
WBC # FLD AUTO: 3.9 K/UL — SIGNIFICANT CHANGE UP (ref 3.8–10.5)
WBC # FLD AUTO: 4.6 K/UL — SIGNIFICANT CHANGE UP (ref 3.8–10.5)
WBC # FLD AUTO: 5.3 K/UL — SIGNIFICANT CHANGE UP (ref 3.8–10.5)
WBC # FLD AUTO: 7.4 K/UL — SIGNIFICANT CHANGE UP (ref 3.8–10.5)

## 2017-07-28 PROCEDURE — 43235 EGD DIAGNOSTIC BRUSH WASH: CPT | Mod: GC

## 2017-07-28 PROCEDURE — 99222 1ST HOSP IP/OBS MODERATE 55: CPT | Mod: 25,GC

## 2017-07-28 RX ORDER — CYCLOBENZAPRINE HYDROCHLORIDE 10 MG/1
10 TABLET, FILM COATED ORAL THREE TIMES A DAY
Qty: 0 | Refills: 0 | Status: DISCONTINUED | OUTPATIENT
Start: 2017-07-28 | End: 2017-08-01

## 2017-07-28 RX ORDER — LACTOBACILLUS RHAMNOSUS GG 10B CELL
1 CAPSULE ORAL
Qty: 0 | Refills: 0 | COMMUNITY

## 2017-07-28 RX ORDER — SENNA PLUS 8.6 MG/1
1 TABLET ORAL
Qty: 0 | Refills: 0 | COMMUNITY

## 2017-07-28 RX ORDER — TRAZODONE HCL 50 MG
100 TABLET ORAL AT BEDTIME
Qty: 0 | Refills: 0 | Status: DISCONTINUED | OUTPATIENT
Start: 2017-07-28 | End: 2017-07-29

## 2017-07-28 RX ORDER — INSULIN GLARGINE 100 [IU]/ML
36 INJECTION, SOLUTION SUBCUTANEOUS
Qty: 0 | Refills: 0 | COMMUNITY

## 2017-07-28 RX ORDER — OMEGA-3 ACID ETHYL ESTERS 1 G
1 CAPSULE ORAL
Qty: 0 | Refills: 0 | COMMUNITY

## 2017-07-28 RX ORDER — ALBUTEROL 90 UG/1
2 AEROSOL, METERED ORAL EVERY 6 HOURS
Qty: 0 | Refills: 0 | Status: DISCONTINUED | OUTPATIENT
Start: 2017-07-28 | End: 2017-08-01

## 2017-07-28 RX ORDER — LEVOTHYROXINE SODIUM 125 MCG
50 TABLET ORAL DAILY
Qty: 0 | Refills: 0 | Status: DISCONTINUED | OUTPATIENT
Start: 2017-07-28 | End: 2017-08-01

## 2017-07-28 RX ORDER — DEXTROSE 50 % IN WATER 50 %
1 SYRINGE (ML) INTRAVENOUS ONCE
Qty: 0 | Refills: 0 | Status: DISCONTINUED | OUTPATIENT
Start: 2017-07-28 | End: 2017-08-01

## 2017-07-28 RX ORDER — MONTELUKAST 4 MG/1
1 TABLET, CHEWABLE ORAL
Qty: 0 | Refills: 0 | COMMUNITY

## 2017-07-28 RX ORDER — TRAZODONE HCL 50 MG
3 TABLET ORAL
Qty: 0 | Refills: 0 | COMMUNITY

## 2017-07-28 RX ORDER — INSULIN LISPRO 100/ML
VIAL (ML) SUBCUTANEOUS
Qty: 0 | Refills: 0 | Status: DISCONTINUED | OUTPATIENT
Start: 2017-07-28 | End: 2017-08-01

## 2017-07-28 RX ORDER — SENNA PLUS 8.6 MG/1
3 TABLET ORAL
Qty: 0 | Refills: 0 | COMMUNITY

## 2017-07-28 RX ORDER — LACTULOSE 10 G/15ML
15 SOLUTION ORAL
Qty: 0 | Refills: 0 | COMMUNITY

## 2017-07-28 RX ORDER — GLUCAGON INJECTION, SOLUTION 0.5 MG/.1ML
1 INJECTION, SOLUTION SUBCUTANEOUS ONCE
Qty: 0 | Refills: 0 | Status: DISCONTINUED | OUTPATIENT
Start: 2017-07-28 | End: 2017-08-01

## 2017-07-28 RX ORDER — SODIUM CHLORIDE 9 MG/ML
1000 INJECTION, SOLUTION INTRAVENOUS
Qty: 0 | Refills: 0 | Status: DISCONTINUED | OUTPATIENT
Start: 2017-07-28 | End: 2017-08-01

## 2017-07-28 RX ORDER — SENNA PLUS 8.6 MG/1
1 TABLET ORAL AT BEDTIME
Qty: 0 | Refills: 0 | Status: DISCONTINUED | OUTPATIENT
Start: 2017-07-28 | End: 2017-08-01

## 2017-07-28 RX ORDER — EZETIMIBE 10 MG/1
1 TABLET ORAL
Qty: 0 | Refills: 0 | COMMUNITY

## 2017-07-28 RX ORDER — LEVOTHYROXINE SODIUM 125 MCG
1 TABLET ORAL
Qty: 0 | Refills: 0 | COMMUNITY

## 2017-07-28 RX ORDER — LEVOCETIRIZINE DIHYDROCHLORIDE 0.5 MG/ML
1 SOLUTION ORAL
Qty: 0 | Refills: 0 | COMMUNITY

## 2017-07-28 RX ORDER — INSULIN GLARGINE 100 [IU]/ML
21 INJECTION, SOLUTION SUBCUTANEOUS AT BEDTIME
Qty: 0 | Refills: 0 | Status: DISCONTINUED | OUTPATIENT
Start: 2017-07-28 | End: 2017-08-01

## 2017-07-28 RX ORDER — ATORVASTATIN CALCIUM 80 MG/1
10 TABLET, FILM COATED ORAL AT BEDTIME
Qty: 0 | Refills: 0 | Status: DISCONTINUED | OUTPATIENT
Start: 2017-07-28 | End: 2017-08-01

## 2017-07-28 RX ORDER — GABAPENTIN 400 MG/1
4 CAPSULE ORAL
Qty: 0 | Refills: 0 | COMMUNITY

## 2017-07-28 RX ORDER — DULOXETINE HYDROCHLORIDE 30 MG/1
0 CAPSULE, DELAYED RELEASE ORAL
Qty: 0 | Refills: 0 | COMMUNITY

## 2017-07-28 RX ORDER — TIOTROPIUM BROMIDE 18 UG/1
1 CAPSULE ORAL; RESPIRATORY (INHALATION) DAILY
Qty: 0 | Refills: 0 | Status: DISCONTINUED | OUTPATIENT
Start: 2017-07-28 | End: 2017-08-01

## 2017-07-28 RX ORDER — CHOLECALCIFEROL (VITAMIN D3) 125 MCG
1 CAPSULE ORAL
Qty: 0 | Refills: 0 | COMMUNITY

## 2017-07-28 RX ORDER — INSULIN LISPRO 100/ML
VIAL (ML) SUBCUTANEOUS AT BEDTIME
Qty: 0 | Refills: 0 | Status: DISCONTINUED | OUTPATIENT
Start: 2017-07-28 | End: 2017-08-01

## 2017-07-28 RX ORDER — EPINEPHRINE 0.3 MG/.3ML
3 INJECTION INTRAMUSCULAR; SUBCUTANEOUS
Qty: 0 | Refills: 0 | COMMUNITY

## 2017-07-28 RX ORDER — LEVOTHYROXINE SODIUM 125 MCG
2 TABLET ORAL
Qty: 0 | Refills: 0 | COMMUNITY

## 2017-07-28 RX ORDER — OMEPRAZOLE 10 MG/1
1 CAPSULE, DELAYED RELEASE ORAL
Qty: 0 | Refills: 0 | COMMUNITY

## 2017-07-28 RX ORDER — ATORVASTATIN CALCIUM 80 MG/1
1 TABLET, FILM COATED ORAL
Qty: 0 | Refills: 0 | COMMUNITY

## 2017-07-28 RX ORDER — DULOXETINE HYDROCHLORIDE 30 MG/1
1 CAPSULE, DELAYED RELEASE ORAL
Qty: 0 | Refills: 0 | COMMUNITY

## 2017-07-28 RX ORDER — BREXPIPRAZOLE 0.25 MG/1
1 TABLET ORAL
Qty: 0 | Refills: 0 | COMMUNITY

## 2017-07-28 RX ORDER — DULOXETINE HYDROCHLORIDE 30 MG/1
60 CAPSULE, DELAYED RELEASE ORAL
Qty: 0 | Refills: 0 | Status: DISCONTINUED | OUTPATIENT
Start: 2017-07-28 | End: 2017-08-01

## 2017-07-28 RX ORDER — MORPHINE SULFATE 50 MG/1
15 CAPSULE, EXTENDED RELEASE ORAL EVERY 6 HOURS
Qty: 0 | Refills: 0 | Status: DISCONTINUED | OUTPATIENT
Start: 2017-07-28 | End: 2017-08-01

## 2017-07-28 RX ORDER — TRAZODONE HCL 50 MG
200 TABLET ORAL ONCE
Qty: 0 | Refills: 0 | Status: COMPLETED | OUTPATIENT
Start: 2017-07-28 | End: 2017-07-28

## 2017-07-28 RX ORDER — ENOXAPARIN SODIUM 100 MG/ML
42 INJECTION SUBCUTANEOUS
Qty: 0 | Refills: 0 | COMMUNITY

## 2017-07-28 RX ORDER — DEXTROSE 50 % IN WATER 50 %
25 SYRINGE (ML) INTRAVENOUS ONCE
Qty: 0 | Refills: 0 | Status: DISCONTINUED | OUTPATIENT
Start: 2017-07-28 | End: 2017-08-01

## 2017-07-28 RX ORDER — LACTULOSE 10 G/15ML
10 SOLUTION ORAL EVERY 8 HOURS
Qty: 0 | Refills: 0 | Status: DISCONTINUED | OUTPATIENT
Start: 2017-07-28 | End: 2017-07-28

## 2017-07-28 RX ORDER — ALBUTEROL 90 UG/1
2 AEROSOL, METERED ORAL
Qty: 0 | Refills: 0 | COMMUNITY

## 2017-07-28 RX ORDER — GABAPENTIN 400 MG/1
400 CAPSULE ORAL THREE TIMES A DAY
Qty: 0 | Refills: 0 | Status: DISCONTINUED | OUTPATIENT
Start: 2017-07-28 | End: 2017-08-01

## 2017-07-28 RX ORDER — ESOMEPRAZOLE MAGNESIUM 40 MG/1
1 CAPSULE, DELAYED RELEASE ORAL
Qty: 0 | Refills: 0 | COMMUNITY

## 2017-07-28 RX ORDER — DEXTROSE 50 % IN WATER 50 %
12.5 SYRINGE (ML) INTRAVENOUS ONCE
Qty: 0 | Refills: 0 | Status: DISCONTINUED | OUTPATIENT
Start: 2017-07-28 | End: 2017-08-01

## 2017-07-28 RX ORDER — CYCLOBENZAPRINE HYDROCHLORIDE 10 MG/1
1 TABLET, FILM COATED ORAL
Qty: 0 | Refills: 0 | COMMUNITY

## 2017-07-28 RX ORDER — CLONAZEPAM 1 MG
3 TABLET ORAL
Qty: 0 | Refills: 0 | COMMUNITY

## 2017-07-28 RX ORDER — MIRTAZAPINE 45 MG/1
45 TABLET, ORALLY DISINTEGRATING ORAL AT BEDTIME
Qty: 0 | Refills: 0 | Status: DISCONTINUED | OUTPATIENT
Start: 2017-07-28 | End: 2017-08-01

## 2017-07-28 RX ADMIN — MORPHINE SULFATE 15 MILLIGRAM(S): 50 CAPSULE, EXTENDED RELEASE ORAL at 22:15

## 2017-07-28 RX ADMIN — MORPHINE SULFATE 15 MILLIGRAM(S): 50 CAPSULE, EXTENDED RELEASE ORAL at 07:00

## 2017-07-28 RX ADMIN — Medication 200 MILLIGRAM(S): at 22:32

## 2017-07-28 RX ADMIN — GABAPENTIN 400 MILLIGRAM(S): 400 CAPSULE ORAL at 12:58

## 2017-07-28 RX ADMIN — Medication 100 MILLIGRAM(S): at 21:43

## 2017-07-28 RX ADMIN — PANTOPRAZOLE SODIUM 40 MILLIGRAM(S): 20 TABLET, DELAYED RELEASE ORAL at 05:32

## 2017-07-28 RX ADMIN — MORPHINE SULFATE 15 MILLIGRAM(S): 50 CAPSULE, EXTENDED RELEASE ORAL at 06:34

## 2017-07-28 RX ADMIN — DULOXETINE HYDROCHLORIDE 60 MILLIGRAM(S): 30 CAPSULE, DELAYED RELEASE ORAL at 21:43

## 2017-07-28 RX ADMIN — TIOTROPIUM BROMIDE 1 CAPSULE(S): 18 CAPSULE ORAL; RESPIRATORY (INHALATION) at 12:57

## 2017-07-28 RX ADMIN — MIRTAZAPINE 45 MILLIGRAM(S): 45 TABLET, ORALLY DISINTEGRATING ORAL at 21:42

## 2017-07-28 RX ADMIN — MORPHINE SULFATE 15 MILLIGRAM(S): 50 CAPSULE, EXTENDED RELEASE ORAL at 12:53

## 2017-07-28 RX ADMIN — PANTOPRAZOLE SODIUM 40 MILLIGRAM(S): 20 TABLET, DELAYED RELEASE ORAL at 21:44

## 2017-07-28 RX ADMIN — MORPHINE SULFATE 15 MILLIGRAM(S): 50 CAPSULE, EXTENDED RELEASE ORAL at 21:44

## 2017-07-28 RX ADMIN — CYCLOBENZAPRINE HYDROCHLORIDE 10 MILLIGRAM(S): 10 TABLET, FILM COATED ORAL at 22:29

## 2017-07-28 RX ADMIN — SENNA PLUS 1 TABLET(S): 8.6 TABLET ORAL at 21:43

## 2017-07-28 RX ADMIN — Medication 4: at 18:43

## 2017-07-28 RX ADMIN — INSULIN GLARGINE 21 UNIT(S): 100 INJECTION, SOLUTION SUBCUTANEOUS at 22:29

## 2017-07-28 RX ADMIN — Medication 4: at 22:30

## 2017-07-28 RX ADMIN — GABAPENTIN 400 MILLIGRAM(S): 400 CAPSULE ORAL at 21:42

## 2017-07-28 RX ADMIN — Medication 50 MICROGRAM(S): at 12:56

## 2017-07-28 NOTE — H&P ADULT - PROBLEM SELECTOR PLAN 3
- last EGD 3 years ago, needs repeat scope for screening and diagnostic purposes in setting of reported melena.   - cont IV PPI 40 BId for now.  - holding lactulose and rest of bowel regimen (except senna while she's on opioids) until she is scoped. - last EGD 3 years ago, needs repeat scope for screening and diagnostic purposes in setting of reported melena.  - needs diagnostic tap w/ large belly and likely ascites, bedside u/s was unavailable in ED unfortunately.    - cont IV PPI 40 BId for now w/ concern of upper gi bleed  - holding lactulose and rest of bowel regimen (except senna while she's on opioids) until she is scoped.  - will need diuretic regimen upon d/c. - last EGD 3 years ago, needs repeat scope for screening and diagnostic purposes in setting of reported melena.  - needs diagnostic tap w/ large belly and likely ascites, bedside u/s was unavailable in ED unfortunately.   - No NIVIA, don't need to worry about albumin right now.    - cont IV PPI 40 BId for now w/ concern of upper gi bleed  - holding lactulose and rest of bowel regimen (except senna while she's on opioids) until she is scoped.  - will need diuretic regimen upon d/c.

## 2017-07-28 NOTE — H&P ADULT - PMH
Anemia    Anxiety    Anxiety and depression    Asthma    Chronic sinusitis    Cirrhosis    Constipation, chronic    COPD (chronic obstructive pulmonary disease)  no recent exacerb  Diabetes mellitus  type 2  Fall at home    Fatty liver    GERD (gastroesophageal reflux disease)    Hyperlipidemia    Hypertension    Lung nodules    Syncope and collapse  10/2016 with long hospital stay at [facility], with negative cardiac work up as per patient, s/p angiogram too with no blockages  as per patient. + right arm nerve damages.  Thrombocytopenia    TIA (transient ischemic attack)  10/2016 on ASA and Plavix as per Dr. Perez

## 2017-07-28 NOTE — H&P ADULT - PROBLEM SELECTOR PLAN 4
- extensive pain regimen, following w/ pain clinic outside. Will continue gabapentin 400 TID, flexiril tid prn, cymbalta bid, and morphine 15 q6 PRN. Of note she had a mild chronic respiratory acidosis VBG 7.3/58 which is likely 2/2 OHS + opioids.

## 2017-07-28 NOTE — H&P ADULT - PROBLEM SELECTOR PLAN 7
- major issue with her, iatrogenic component to her falls and frailty is likely as important as her cirrhosis.  - from GI perspective would d/c extensive bowel regimen and stick to lactulose/miralax when she starts taking again.  - pain regimen needs to be tightened as well, multiple serotonergic meds on board. - major issue with her, iatrogenic component to her falls and frailty is likely as important as her cirrhosis.  -  would d/c extensive bowel regimen and stick to lactulose/miralax when she starts taking again.  - pain regimen needs to be tightened as well, multiple serotonergic meds on board.  - would address chronic opioid regimen once she gets her spinal cord stimulator implanted soon, also address need for qHS benzos.

## 2017-07-28 NOTE — CHART NOTE - NSCHARTNOTEFT_GEN_A_CORE
Called by ER for c/o back pain.  Patient is pending full H/P; however, given c/o back pain, ISTOP performed (ID #: [id number]) and it appears patient has chronic pain 2/2 stenosis with o/p ISTOP records showing the followin2017	morphine sulfate ir 15 mg tab	120	30	Ozzie Trent M D  2017	lyrica 50 mg capsule	90	30	Ozzie Trent M D  2017	morphine sulfate ir 15 mg tab	24	6	Ozzie Trent M D  2017	clonazepam 0.5 mg tablet	120	30	Ella Capellan MD  2017	hydromorphone 4 mg tablet	120	30	Carolina Perdue (PA)  2017	clonazepam 1 mg tablet	60	30	Ella Capellan MD  2017	hydromorphone 4 mg tablet	90	30	Ozzie Trent M D  2017	clonazepam 1 mg tablet	60	30	Ella Capellan MD  2017	clonazepam 0.5 mg tablet	60	30	Ella Capellan MD  2017	hydromorphone 4 mg tablet	120	30	Carolina Perdue ([place])  2017	clonazepam 0.5 mg tablet	120	30	Ella Capellan MD  03/15/2017	2017	oxycodone hcl 15 mg tablet	120	30	Ozzie Trent M D  2017	oxycodone hcl 15 mg tablet	90	30	Ozzie Trent M D  2017	02/15/2017	clonazepam 0.5 mg tablet	120	30	Ella Capellan MD  2017	oxycodone hcl 15 mg tablet	45	15	Ozzie Trent M D  2017	clonazepam 0.5 mg tablet	60	15	Ella Capellan MD  2017	oxycodone hcl er 20 mg tablet	46	23	Ozzie Trent M D  2017	oxycodone hcl 10 mg tablet	60	30	Ozzie Trent M D  2017	01/10/2017	oxycodone hcl 10 mg tablet	14	7	Ozzie Trent M D  2017	01/10/2017	oxycodone hcl er 20 mg tablet	14	7	Ozzie Trent M D  2017	clonazepam 0.5 mg tablet	42	14	Adrianne Vargas  2017	lyrica 50 mg capsule	30	30	Adrianne Vargas  2016	oxycodone hcl er 20 mg tablet	90	30	Ozzie Trent M D  2016	clonazepam 0.5 mg tablet	90	30	Adrianne Vargas  2016	clonazepam 0.5 mg tablet	10	5	BretWei adam NP  2016	oxycodone hcl 5 mg tablet	15	5	Wei Queen NP  2016	oxycontin 20 mg tablet	10	5	BretWei adam NP  10/27/2016	10/28/2016	clonazepam 0.5 mg tablet	120	30	Ella Capellan MD  10/26/2016	10/26/2016	methadone hcl 10 mg tablet	42	14	Ozzie Trent M D  10/24/2016	10/24/2016	acetaminophen-cod #3 tablet	12	3	NievesGiovana DDS  10/05/2016	10/07/2016	oxycontin 20 mg tablet	60	30	Ozzie Trent M D  2016	clonazepam 0.5 mg tablet	90	30	Ella Capellan MD  2016	oxycontin 20 mg tablet	30	15	Ozzie Trent M D  2016	clonazepam 0.5 mg tablet	60	30	Sonja Rodriguez Npp  2016	oxycontin 10 mg tablet	60	30	Sonja Rodriguez Npp    At this time will start morphine IR 15 po q6 prn for moderate or severe pain; full H/P to follow by attending in am.

## 2017-07-28 NOTE — H&P ADULT - NSHPREVIEWOFSYSTEMS_GEN_ALL_CORE
CONSTITUTIONAL: No fever.  EYES: No eye pain or discharge.  ENMT:  No sinus or throat pain  NECK: No pain or stiffness  RESPIRATORY: No cough, wheezing, chills or hemoptysis; No shortness of breath  CARDIOVASCULAR: No chest pain, palpitations, dizziness, or leg swelling  GASTROINTESTINAL: +Diarrhea. With mild diffuse abdominal pain.   GENITOURINARY: No dysuria or incontinence  NEUROLOGICAL: No headaches, memory loss, loss of strength, numbness, or tremors  SKIN: No rashes.  LYMPH NODES: No enlarged glands  ENDOCRINE: No heat or cold intolerance; No hair loss  MUSCULOSKELETAL: No joint pain or swelling; No muscle, back, or extremity pain  PSYCHIATRIC: No depression, anxiety, mood swings, or difficulty sleeping

## 2017-07-28 NOTE — H&P ADULT - NSHPLABSRESULTS_GEN_ALL_CORE
In ED vitals were notable for tachycardia to 107, otherwise no fever, BP stable at 116/69, 96% on RA. Labs w/ Hgb 8.5 though repeat w/ 10.5 (at baseline), no leukocytosis and chronic thrombocytopenia. BMP w/ mild hyponatremia 134, otherwise Cr .88, bicarb 25. VBG 7.3/58 and chronic transaminitis as well as chronic coagulopathy INR 1.18. No imaging obtained.

## 2017-07-28 NOTE — H&P ADULT - NSHPPHYSICALEXAM_GEN_ALL_CORE
PHYSICAL EXAM:  GENERAL: NAD, AO x 3, pleasant. Wearing life alert. no glasses or hearing aids, no dentures.   HEAD:  Atraumatic, Normocephalic. Poor dentition. No pinpoint pupils, pale conjunctiva. Chewing gum.  EYES: EOMI, PERRLA, conjunctiva and sclera clear  NECK: Supple, No JVD  CHEST/LUNG: Clear to auscultation bilaterally; No wheeze  HEART: Regular rate and rhythm at 100; No murmurs, rubs, or gallops  ABDOMEN: Soft, Nontender, distended w/ prominent veins.  EXTREMITIES:  Trace edema in b/l LE, no asterixis.   PSYCH: AAOx3  SKIN: No rashes or lesions

## 2017-07-28 NOTE — H&P ADULT - ASSESSMENT
Ms. Moody is a 60 yo woman, independent in ADLs and IADLs w/ HHA 3x/week, w/ AMARO cirrhosis d/b ascites + HE (1 BM/day), also w/ chronic LBP on opiates and anxiety on benzos, w/ hx notable for multiple falls, who presents w/ black diarrhea. Last scoped 3 years ago, due for another screening EGD which will now be diagnostic. She's HD stable and I anticipate it will demonstrate mild gastritis, diarrhea likely 2/2 amoxicillin which has stopped after she stopped her amoxicillin, hgb at baseline after 1 spurious reading. Main medical concern now is frailty and polypharmacy in setting of decompensated cirrhosis.

## 2017-07-28 NOTE — H&P ADULT - PROBLEM SELECTOR PLAN 8
- regular rhythm, mild. Massively fluid overloaded, will avoid giving more. I believe this is mostly driven by low back pain. Will re-asses.

## 2017-07-28 NOTE — H&P ADULT - PROBLEM SELECTOR PLAN 1
- likely 2/2 amoxicillin though will keep NPO for now and call GI for screening and diagnostic EGD in setting of hx of melena. No fevers or chills or white count concerning for inflammatory vs non inflammatory infectious etiology. Soft abdomen, no more diarrhea and well appearing.     - denies taking any peptobismol  - not sending c diff as she doesn't have any more diarrhea  - HD stable  - 2 large bore IVs  - holding prophylactic anticoagulation w/ cirrhosis and chronic thrombocytopenia w/ concern for bleeding, will give SCDs - likely 2/2 amoxicillin though will keep NPO for now and call GI for screening and diagnostic EGD in setting of hx of melena. No fevers or chills or white count concerning for inflammatory vs non inflammatory infectious etiology. Soft abdomen, no more diarrhea and well appearing.     - denies taking any peptobismol  - GI consult in  - not sending c diff as she doesn't have any more diarrhea  - HD stable  - 2 large bore IVs  - holding prophylactic anticoagulation w/ cirrhosis and chronic thrombocytopenia w/ concern for bleeding, will give SCDs

## 2017-07-28 NOTE — CONSULT NOTE ADULT - ASSESSMENT
62 yo F with AMARO cirrhosis c/b hx of ascites and HE in the past, GERD/gastritis presenting with 4 days of diarrhea.    Impression  Melena and diarrhea x 4 days. Hb stable and vitals stable since admission. Concern for possible upper GI bleed vs lower GI bleed. Has a hx of polyps on prior colonoscopy. No varices on EGD. Ddx: PUD vs gastritis/esophagitis vs. polyp vs. infectious vs varices (less likely.)    - 62 yo F with AMARO cirrhosis c/b hx of ascites and HE in the past, GERD/gastritis presenting with 4 days of diarrhea.    Impression  Melena and diarrhea x 4 days. Hb stable and vitals stable since admission. Concern for possible upper GI bleed vs lower GI bleed. Has a hx of polyps on prior colonoscopy. No varices on EGD. Ddx: PUD vs gastritis/esophagitis vs. polyp vs. infectious vs varices (less likely.)    -f/u stool cultures and C.diff  -NPO for now, plan for EGD today  -PPI IV BID  -serial CBC q4-6 hr  -active type and screen, low threshold to transfuse if Hb drops  -GI will follow

## 2017-07-28 NOTE — H&P ADULT - HISTORY OF PRESENT ILLNESS
Ms. Moody is a 60 yo woman, independent in ADLs and IADLs w/ HHA 3x/week, w/ AMARO cirrhosis d/b ascites + HE (1 BM/day), also w/ chronic LBP on opiates and anxiety on benzos, w/ hx notable for multiple falls, who presents w/ black diarrhea.     Went to urgent care for what she thought was bacterial sinusitis, treated w/ amoxicillin. 3 days later she developed black diarrhea.  No BRBPR or hematochezia, no vomiting, no spitting up blood. Self d/janett amoxicillin as well as many of her own meds. 7 episodes of diarrhea each day, then 5, then 3, now no more diarrhea she just came in to ED at the advice of her GI doc. No fevers or chills. No CP or SOB.     In ED vitals were notable for tachycardia to 107, otherwise no fever, BP stable at 116/69, 96% on RA. Labs w/ Hgb 8.5 though repeat w/ 10.5 (at baseline), no leukocytosis and chronic thrombocytopenia. BMP w/ mild hyponatremia 134, otherwise Cr .88, bicarb 25. VBG 7.3/58 and chronic transaminitis as well as chronic coagulopathy INR 1.18. No imaging obtained. Given IV PPI 40 and 80, 1L NS. Admitted for scope in setting of what melena.     Of note she lives in a condo w/ 15+ steps, she can't navigate so she takes the elevator. She's fallen multiple times recently, on benzos and opioids as above + encephalopathy inadequately treated w/ 1 BM/day. She fell once recently and "I wasn't found for 3 days." She's now wearing a life alert. No etOH or cigarettes. Recently gained about 40 lbs, says she no longer takes her diuretics and has been taking her home meds on a prn basis.

## 2017-07-28 NOTE — PROGRESS NOTE ADULT - SUBJECTIVE AND OBJECTIVE BOX
CIERRA HANNON  61y Female  MRN:[id number]    Patient is a 61y old  Female who presents with a chief complaint of "Black Diarrhea" (28 Jul 2017 09:04)    HPI:  Ms. Hannon is a 60 yo woman, independent in ADLs and IADLs w/ HHA 3x/week, w/ AMARO cirrhosis d/b ascites + HE (1 BM/day), also w/ chronic LBP on opiates and anxiety on benzos, w/ hx notable for multiple falls, who presents w/ black diarrhea.     Went to urgent care for what she thought was bacterial sinusitis, treated w/ amoxicillin. 3 days later she developed black diarrhea.  No BRBPR or hematochezia, no vomiting, no spitting up blood. Self d/janett amoxicillin as well as many of her own meds. 7 episodes of diarrhea each day, then 5, then 3, now no more diarrhea she just came in to ED at the advice of her GI doc. No fevers or chills. No CP or SOB.     In ED vitals were notable for tachycardia to 107, otherwise no fever, BP stable at 116/69, 96% on RA. Labs w/ Hgb 8.5 though repeat w/ 10.5 (at baseline), no leukocytosis and chronic thrombocytopenia. BMP w/ mild hyponatremia 134, otherwise Cr .88, bicarb 25. VBG 7.3/58 and chronic transaminitis as well as chronic coagulopathy INR 1.18. No imaging obtained. Given IV PPI 40 and 80, 1L NS. Admitted for scope in setting of what melena.     Of note she lives in a condo w/ 15+ steps, she can't navigate so she takes the elevator. She's fallen multiple times recently, on benzos and opioids as above + encephalopathy inadequately treated w/ 1 BM/day. She fell once recently and "I wasn't found for 3 days." She's now wearing a life alert. No etOH or cigarettes. Recently gained about 40 lbs, says she no longer takes her diuretics and has been taking her home meds on a prn basis. (28 Jul 2017 09:04)      Patient seen and evaluated at bedside. No acute events overnight except as noted.    Interval HPI: no BM today    PAST MEDICAL & SURGICAL HISTORY:  TIA (transient ischemic attack): 10/2016 on ASA and Plavix as per Dr. Perez  Syncope and collapse: 10/2016 with long hospital stay at [facility], with negative cardiac work up as per patient, s/p angiogram too with no blockages  as per patient. + right arm nerve damages.  Anxiety and depression  Thrombocytopenia  Constipation, chronic  Anemia  COPD (chronic obstructive pulmonary disease): no recent exacerb  Fall at home  Chronic sinusitis  GERD (gastroesophageal reflux disease)  Lung nodules  Diabetes mellitus: type 2  Fatty liver  Cirrhosis  Anxiety  Hyperlipidemia  Hypertension  Asthma  H/O lumbar discectomy: h/o lumbar laminectomy- 1995  History of laminectomy: L4-5 and S1 in 1998      REVIEW OF SYSTEMS:  Constitutional: No fever, chills, fatigue or weight loss.  Skin: No rash.  Eyes: No recent vision problems or eye pain.  ENT: No congestion, ear pain, or sore throat.  Endocrine: No thyroid problems.  Cardiovascular: No chest pain or palpation.  Respiratory: No cough, shortness of breath, congestion, or wheezing.  Gastrointestinal: as per hpi  Genitourinary: No dysuria.  Musculoskeletal: No joint swelling.  Neurologic: No headache.    VITALS:  Vital Signs Last 24 Hrs  T(C): 36.9 (28 Jul 2017 07:01), Max: 37.2 (28 Jul 2017 03:14)  T(F): 98.4 (28 Jul 2017 07:01), Max: 99 (28 Jul 2017 03:14)  HR: 104 (28 Jul 2017 07:01) (96 - 110)  BP: 109/74 (28 Jul 2017 07:01) (109/74 - 131/81)  BP(mean): --  RR: 18 (28 Jul 2017 07:01) (15 - 18)  SpO2: 95% (28 Jul 2017 07:01) (95% - 97%)  CAPILLARY BLOOD GLUCOSE  212 (28 Jul 2017 12:43)        I&O's Summary      PHYSICAL EXAM:  GENERAL: NAD, well-developed  HEAD:  Atraumatic, Normocephalic  EYES: EOMI, PERRLA, conjunctiva and sclera clear  NECK: Supple, No JVD  CHEST/LUNG: Clear to auscultation bilaterally; No wheeze  HEART: S1, S2; No murmurs, rubs, or gallops  ABDOMEN: Soft, Nontender, Nondistended; Bowel sounds present  EXTREMITIES:  2+ Peripheral Pulses, No clubbing, cyanosis, or edema  PSYCH: Normal affect  NEUROLOGY: AAOX3; non-focal  SKIN: No rashes or lesions    Consultant(s) Notes Reviewed:  [x ] YES  [ ] NO  Care Discussed with Consultants/Other Providers [ x] YES  [ ] NO    MEDS:  MEDICATIONS  (STANDING):  pantoprazole  Injectable 40 milliGRAM(s) IV Push two times a day  gabapentin 400 milliGRAM(s) Oral three times a day  mirtazapine 45 milliGRAM(s) Oral at bedtime  DULoxetine 60 milliGRAM(s) Oral two times a day  insulin glargine Injectable (LANTUS) 21 Unit(s) SubCutaneous at bedtime  atorvastatin 10 milliGRAM(s) Oral at bedtime  tiotropium 18 MICROgram(s) Capsule 1 Capsule(s) Inhalation daily  senna 1 Tablet(s) Oral at bedtime  rifaximin 550 milliGRAM(s) Oral two times a day  levothyroxine 50 MICROGram(s) Oral daily  insulin lispro (HumaLOG) corrective regimen sliding scale   SubCutaneous three times a day before meals  insulin lispro (HumaLOG) corrective regimen sliding scale   SubCutaneous at bedtime  dextrose 5%. 1000 milliLiter(s) (50 mL/Hr) IV Continuous <Continuous>  dextrose 50% Injectable 12.5 Gram(s) IV Push once  dextrose 50% Injectable 25 Gram(s) IV Push once  dextrose 50% Injectable 25 Gram(s) IV Push once  traZODone 100 milliGRAM(s) Oral at bedtime    MEDICATIONS  (PRN):  morphine  IR 15 milliGRAM(s) Oral every 6 hours PRN moderate or severe pain  ALBUTerol    90 MICROgram(s) HFA Inhaler 2 Puff(s) Inhalation every 6 hours PRN Shortness of Breath  cyclobenzaprine 10 milliGRAM(s) Oral three times a day PRN Muscle Spasm  dextrose Gel 1 Dose(s) Oral once PRN Blood Glucose LESS THAN 70 milliGRAM(s)/deciliter  glucagon  Injectable 1 milliGRAM(s) IntraMuscular once PRN Glucose LESS THAN 70 milligrams/deciliter    ALLERGIES:  adhesives (Rash)  Bactrim (Other)  Decadron (Angioedema)  fish (Pruritus)  hydrocortisone (Angioedema)  morphine (Urticaria)  sulfa drugs (Other)      LABS:                        9.9    4.6   )-----------( 89       ( 28 Jul 2017 11:04 )             31.6     07-27    134<L>  |  97  |  13  ----------------------------<  190<H>  3.9   |  25  |  0.88    Ca    9.4      27 Jul 2017 21:23    TPro  8.2  /  Alb  3.8  /  TBili  0.5  /  DBili  x   /  AST  47<H>  /  ALT  33  /  AlkPhos  174<H>  07-27    PT/INR - ( 27 Jul 2017 20:58 )   PT: 12.8 sec;   INR: 1.18 ratio         PTT - ( 27 Jul 2017 20:58 )  PTT:26.7 sec      LIVER FUNCTIONS - ( 27 Jul 2017 21:23 )  Alb: 3.8 g/dL / Pro: 8.2 g/dL / ALK PHOS: 174 U/L / ALT: 33 U/L RC / AST: 47 U/L / GGT: x

## 2017-07-28 NOTE — H&P ADULT - PROBLEM SELECTOR PLAN 6
- lantus 21 from 42 while NPO  - will add q6 mid dose sliding scale throughout the day of lispro, if inadqeuately controlling will raise to high dose. - lantus 21 from home 42 while NPO  - will add q6 fingerstick and mid dose sliding scale, if inadqeuately controlling will raise to high dose.  - she needs to see a dentist.

## 2017-07-28 NOTE — CONSULT NOTE ADULT - SUBJECTIVE AND OBJECTIVE BOX
Chief Complaint:  Patient is a 61y old  Female who presents with a chief complaint of "Black Diarrhea" (28 Jul 2017 09:04)      HPI:  62 yo F with AMARO cirrhosis c/b hx of ascites and HE in the past, GERD/gastritis presenting with 4 days of diarrhea.    4 days ago when to urgent care for concern of bacterial sinusitis. Tx'ed with amoxicillin. 3 days later, she developed black diarrhae. No BRBpR or hematochezia. no N/V. Some mild bilateral lower quadrant abdominal pain. +diarrhea about 7 episodes a day. Currently now 3 episodes a day. At baseline she is constipated. She called her GI doc Dr. Perez who told her to come to the ED for evaluation. Denies any fevers or chills, NSAID use or AC, aspirin or antiplts.    She says she had 1 episode of black stool and diarrhea or GI bleed in the past 2 years ago when she was diagnosed with cdiff. Her last EGD and colonoscopy was 2 years ago. Showed colonic polyps, no varices on EGD.    INR of 1.18 and Plts of 108 on admission.    Allergies:  adhesives (Rash)  Bactrim (Other)  Decadron (Angioedema)  fish (Pruritus)  hydrocortisone (Angioedema)  morphine (Urticaria)  sulfa drugs (Other)      Home Medications:    Hospital Medications:  pantoprazole  Injectable 40 milliGRAM(s) IV Push two times a day  morphine  IR 15 milliGRAM(s) Oral every 6 hours PRN  gabapentin 400 milliGRAM(s) Oral three times a day  mirtazapine 45 milliGRAM(s) Oral at bedtime  DULoxetine 60 milliGRAM(s) Oral two times a day  insulin glargine Injectable (LANTUS) 21 Unit(s) SubCutaneous at bedtime  atorvastatin 10 milliGRAM(s) Oral at bedtime  tiotropium 18 MICROgram(s) Capsule 1 Capsule(s) Inhalation daily  ALBUTerol    90 MICROgram(s) HFA Inhaler 2 Puff(s) Inhalation every 6 hours PRN  senna 1 Tablet(s) Oral at bedtime  rifaximin 550 milliGRAM(s) Oral two times a day  cyclobenzaprine 10 milliGRAM(s) Oral three times a day PRN  levothyroxine 50 MICROGram(s) Oral daily  insulin lispro (HumaLOG) corrective regimen sliding scale   SubCutaneous three times a day before meals  insulin lispro (HumaLOG) corrective regimen sliding scale   SubCutaneous at bedtime  dextrose 5%. 1000 milliLiter(s) IV Continuous <Continuous>  dextrose Gel 1 Dose(s) Oral once PRN  dextrose 50% Injectable 12.5 Gram(s) IV Push once  dextrose 50% Injectable 25 Gram(s) IV Push once  dextrose 50% Injectable 25 Gram(s) IV Push once  glucagon  Injectable 1 milliGRAM(s) IntraMuscular once PRN      PMHX/PSHX:  TIA (transient ischemic attack)  Syncope and collapse  Anxiety and depression  Thrombocytopenia  Constipation, chronic  Anemia  COPD (chronic obstructive pulmonary disease)  Fall at home  Chronic sinusitis  GERD (gastroesophageal reflux disease)  Lung nodules  Diabetes mellitus  Fatty liver  Cirrhosis  Anxiety  Hyperlipidemia  Hypertension  Asthma  H/O lumbar discectomy  History of laminectomy      Family history:  No pertinent family history in first degree relatives      Social History:         PHYSICAL EXAM:     GENERAL:  Appears stated age, well-groomed, well-nourished, no distress  ABDOMEN:  Soft, non-tender, non-distended, normoactive bowel sounds,  no masses ,      Vital Signs:  Vital Signs Last 24 Hrs  T(C): 36.9 (28 Jul 2017 07:01), Max: 37.2 (28 Jul 2017 03:14)  T(F): 98.4 (28 Jul 2017 07:01), Max: 99 (28 Jul 2017 03:14)  HR: 104 (28 Jul 2017 07:01) (96 - 110)  BP: 109/74 (28 Jul 2017 07:01) (109/74 - 131/81)  BP(mean): --  RR: 18 (28 Jul 2017 07:01) (15 - 18)  SpO2: 95% (28 Jul 2017 07:01) (95% - 97%)  Daily     Daily     LABS:                        10.5   5.3   )-----------( 106      ( 28 Jul 2017 06:09 )             33.7     07-27    134<L>  |  97  |  13  ----------------------------<  190<H>  3.9   |  25  |  0.88    Ca    9.4      27 Jul 2017 21:23    TPro  8.2  /  Alb  3.8  /  TBili  0.5  /  DBili  x   /  AST  47<H>  /  ALT  33  /  AlkPhos  174<H>  07-27    LIVER FUNCTIONS - ( 27 Jul 2017 21:23 )  Alb: 3.8 g/dL / Pro: 8.2 g/dL / ALK PHOS: 174 U/L / ALT: 33 U/L RC / AST: 47 U/L / GGT: x           PT/INR - ( 27 Jul 2017 20:58 )   PT: 12.8 sec;   INR: 1.18 ratio         PTT - ( 27 Jul 2017 20:58 )  PTT:26.7 sec        Imaging: Chief Complaint:  Patient is a 61y old  Female who presents with a chief complaint of "Black Diarrhea" (28 Jul 2017 09:04)      HPI:  60 yo F with AMARO cirrhosis c/b hx of ascites and HE in the past, GERD/gastritis presenting with 4 days of melena and diarrhea.    4 days ago when to urgent care for concern of bacterial sinusitis. Tx'ed with amoxicillin. 3 days later, she developed black diarrhae. No BRBpR or hematochezia. no N/V. Some mild bilateral lower quadrant abdominal pain. +diarrhea about 7 episodes a day. Currently now 3 episodes a day. At baseline she is constipated. She called her GI doc Dr. Perez who told her to come to the ED for evaluation. Denies any fevers or chills, NSAID use or AC, aspirin or antiplts.    She says she had 1 episode of black stool and diarrhea or GI bleed in the past 2 years ago when she was diagnosed with cdiff. Her last EGD and colonoscopy was 2 years ago. Showed colonic polyps, no varices on EGD.    INR of 1.18 and Plts of 108 on admission.    Allergies:  adhesives (Rash)  Bactrim (Other)  Decadron (Angioedema)  fish (Pruritus)  hydrocortisone (Angioedema)  morphine (Urticaria)  sulfa drugs (Other)      Home Medications:    Hospital Medications:  pantoprazole  Injectable 40 milliGRAM(s) IV Push two times a day  morphine  IR 15 milliGRAM(s) Oral every 6 hours PRN  gabapentin 400 milliGRAM(s) Oral three times a day  mirtazapine 45 milliGRAM(s) Oral at bedtime  DULoxetine 60 milliGRAM(s) Oral two times a day  insulin glargine Injectable (LANTUS) 21 Unit(s) SubCutaneous at bedtime  atorvastatin 10 milliGRAM(s) Oral at bedtime  tiotropium 18 MICROgram(s) Capsule 1 Capsule(s) Inhalation daily  ALBUTerol    90 MICROgram(s) HFA Inhaler 2 Puff(s) Inhalation every 6 hours PRN  senna 1 Tablet(s) Oral at bedtime  rifaximin 550 milliGRAM(s) Oral two times a day  cyclobenzaprine 10 milliGRAM(s) Oral three times a day PRN  levothyroxine 50 MICROGram(s) Oral daily  insulin lispro (HumaLOG) corrective regimen sliding scale   SubCutaneous three times a day before meals  insulin lispro (HumaLOG) corrective regimen sliding scale   SubCutaneous at bedtime  dextrose 5%. 1000 milliLiter(s) IV Continuous <Continuous>  dextrose Gel 1 Dose(s) Oral once PRN  dextrose 50% Injectable 12.5 Gram(s) IV Push once  dextrose 50% Injectable 25 Gram(s) IV Push once  dextrose 50% Injectable 25 Gram(s) IV Push once  glucagon  Injectable 1 milliGRAM(s) IntraMuscular once PRN      PMHX/PSHX:  TIA (transient ischemic attack)  Syncope and collapse  Anxiety and depression  Thrombocytopenia  Constipation, chronic  Anemia  COPD (chronic obstructive pulmonary disease)  Fall at home  Chronic sinusitis  GERD (gastroesophageal reflux disease)  Lung nodules  Diabetes mellitus  Fatty liver  Cirrhosis  Anxiety  Hyperlipidemia  Hypertension  Asthma  H/O lumbar discectomy  History of laminectomy      Family history:  No pertinent family history in first degree relatives      Social History:         PHYSICAL EXAM:     GENERAL:  Appears stated age, well-groomed, well-nourished, no distress  ABDOMEN:  Soft, non-tender, non-distended, normoactive bowel sounds,  no masses ,      Vital Signs:  Vital Signs Last 24 Hrs  T(C): 36.9 (28 Jul 2017 07:01), Max: 37.2 (28 Jul 2017 03:14)  T(F): 98.4 (28 Jul 2017 07:01), Max: 99 (28 Jul 2017 03:14)  HR: 104 (28 Jul 2017 07:01) (96 - 110)  BP: 109/74 (28 Jul 2017 07:01) (109/74 - 131/81)  BP(mean): --  RR: 18 (28 Jul 2017 07:01) (15 - 18)  SpO2: 95% (28 Jul 2017 07:01) (95% - 97%)  Daily     Daily     LABS:                        10.5   5.3   )-----------( 106      ( 28 Jul 2017 06:09 )             33.7     07-27    134<L>  |  97  |  13  ----------------------------<  190<H>  3.9   |  25  |  0.88    Ca    9.4      27 Jul 2017 21:23    TPro  8.2  /  Alb  3.8  /  TBili  0.5  /  DBili  x   /  AST  47<H>  /  ALT  33  /  AlkPhos  174<H>  07-27    LIVER FUNCTIONS - ( 27 Jul 2017 21:23 )  Alb: 3.8 g/dL / Pro: 8.2 g/dL / ALK PHOS: 174 U/L / ALT: 33 U/L RC / AST: 47 U/L / GGT: x           PT/INR - ( 27 Jul 2017 20:58 )   PT: 12.8 sec;   INR: 1.18 ratio         PTT - ( 27 Jul 2017 20:58 )  PTT:26.7 sec        Imaging:

## 2017-07-29 LAB
ANION GAP SERPL CALC-SCNC: 12 MMOL/L — SIGNIFICANT CHANGE UP (ref 5–17)
BASOPHILS # BLD AUTO: 0.02 K/UL — SIGNIFICANT CHANGE UP (ref 0–0.2)
BASOPHILS NFR BLD AUTO: 0.9 % — SIGNIFICANT CHANGE UP (ref 0–2)
BUN SERPL-MCNC: 8 MG/DL — SIGNIFICANT CHANGE UP (ref 7–23)
CALCIUM SERPL-MCNC: 9.3 MG/DL — SIGNIFICANT CHANGE UP (ref 8.4–10.5)
CHLORIDE SERPL-SCNC: 103 MMOL/L — SIGNIFICANT CHANGE UP (ref 96–108)
CO2 SERPL-SCNC: 25 MMOL/L — SIGNIFICANT CHANGE UP (ref 22–31)
CREAT SERPL-MCNC: 0.74 MG/DL — SIGNIFICANT CHANGE UP (ref 0.5–1.3)
EOSINOPHIL # BLD AUTO: 0.05 K/UL — SIGNIFICANT CHANGE UP (ref 0–0.5)
EOSINOPHIL NFR BLD AUTO: 1.8 % — SIGNIFICANT CHANGE UP (ref 0–6)
GLUCOSE SERPL-MCNC: 228 MG/DL — HIGH (ref 70–99)
HBA1C BLD-MCNC: 9.4 % — HIGH (ref 4–5.6)
HCT VFR BLD CALC: 31 % — LOW (ref 34.5–45)
HCT VFR BLD CALC: 31.6 % — LOW (ref 34.5–45)
HCT VFR BLD CALC: 32.6 % — LOW (ref 34.5–45)
HGB BLD-MCNC: 10.3 G/DL — LOW (ref 11.5–15.5)
HGB BLD-MCNC: 10.8 G/DL — LOW (ref 11.5–15.5)
HGB BLD-MCNC: 9.5 G/DL — LOW (ref 11.5–15.5)
INR BLD: 1.09 RATIO — SIGNIFICANT CHANGE UP (ref 0.88–1.16)
LYMPHOCYTES # BLD AUTO: 0.63 K/UL — LOW (ref 1–3.3)
LYMPHOCYTES # BLD AUTO: 23 % — SIGNIFICANT CHANGE UP (ref 13–44)
MCHC RBC-ENTMCNC: 24.4 PG — LOW (ref 27–34)
MCHC RBC-ENTMCNC: 26 PG — LOW (ref 27–34)
MCHC RBC-ENTMCNC: 26.6 PG — LOW (ref 27–34)
MCHC RBC-ENTMCNC: 30.6 GM/DL — LOW (ref 32–36)
MCHC RBC-ENTMCNC: 32.4 GM/DL — SIGNIFICANT CHANGE UP (ref 32–36)
MCHC RBC-ENTMCNC: 33 GM/DL — SIGNIFICANT CHANGE UP (ref 32–36)
MCV RBC AUTO: 79.7 FL — LOW (ref 80–100)
MCV RBC AUTO: 80.1 FL — SIGNIFICANT CHANGE UP (ref 80–100)
MCV RBC AUTO: 80.4 FL — SIGNIFICANT CHANGE UP (ref 80–100)
MONOCYTES # BLD AUTO: 0.19 K/UL — SIGNIFICANT CHANGE UP (ref 0–0.9)
MONOCYTES NFR BLD AUTO: 7.1 % — SIGNIFICANT CHANGE UP (ref 2–14)
NEUTROPHILS # BLD AUTO: 1.84 K/UL — SIGNIFICANT CHANGE UP (ref 1.8–7.4)
NEUTROPHILS NFR BLD AUTO: 67.3 % — SIGNIFICANT CHANGE UP (ref 43–77)
PLATELET # BLD AUTO: 79 K/UL — LOW (ref 150–400)
PLATELET # BLD AUTO: 85 K/UL — LOW (ref 150–400)
PLATELET # BLD AUTO: 90 K/UL — LOW (ref 150–400)
POTASSIUM SERPL-MCNC: 4.3 MMOL/L — SIGNIFICANT CHANGE UP (ref 3.5–5.3)
POTASSIUM SERPL-SCNC: 4.3 MMOL/L — SIGNIFICANT CHANGE UP (ref 3.5–5.3)
PROTHROM AB SERPL-ACNC: 12.3 SEC — SIGNIFICANT CHANGE UP (ref 10–13.1)
RBC # BLD: 3.89 M/UL — SIGNIFICANT CHANGE UP (ref 3.8–5.2)
RBC # BLD: 3.95 M/UL — SIGNIFICANT CHANGE UP (ref 3.8–5.2)
RBC # BLD: 4.06 M/UL — SIGNIFICANT CHANGE UP (ref 3.8–5.2)
RBC # FLD: 15.6 % — HIGH (ref 10.3–14.5)
RBC # FLD: 15.7 % — HIGH (ref 10.3–14.5)
RBC # FLD: 16.9 % — HIGH (ref 10.3–14.5)
SODIUM SERPL-SCNC: 140 MMOL/L — SIGNIFICANT CHANGE UP (ref 135–145)
TSH SERPL-MCNC: 2.65 UIU/ML — SIGNIFICANT CHANGE UP (ref 0.27–4.2)
VIT D25+D1,25 OH+D1,25 PNL SERPL-MCNC: 34.8 PG/ML — SIGNIFICANT CHANGE UP (ref 19.9–79.3)
WBC # BLD: 2.74 K/UL — LOW (ref 3.8–10.5)
WBC # BLD: 3.1 K/UL — LOW (ref 3.8–10.5)
WBC # BLD: 3.4 K/UL — LOW (ref 3.8–10.5)
WBC # FLD AUTO: 2.74 K/UL — LOW (ref 3.8–10.5)
WBC # FLD AUTO: 3.1 K/UL — LOW (ref 3.8–10.5)
WBC # FLD AUTO: 3.4 K/UL — LOW (ref 3.8–10.5)

## 2017-07-29 RX ORDER — TRAZODONE HCL 50 MG
300 TABLET ORAL AT BEDTIME
Qty: 0 | Refills: 0 | Status: DISCONTINUED | OUTPATIENT
Start: 2017-07-29 | End: 2017-08-01

## 2017-07-29 RX ORDER — MECLIZINE HCL 12.5 MG
25 TABLET ORAL DAILY
Qty: 0 | Refills: 0 | Status: DISCONTINUED | OUTPATIENT
Start: 2017-07-29 | End: 2017-08-01

## 2017-07-29 RX ORDER — ALPRAZOLAM 0.25 MG
0.5 TABLET ORAL ONCE
Qty: 0 | Refills: 0 | Status: DISCONTINUED | OUTPATIENT
Start: 2017-07-29 | End: 2017-07-29

## 2017-07-29 RX ORDER — DIPHENHYDRAMINE HCL/ZINC ACET 2 %-0.1 %
1 CREAM (GRAM) TOPICAL ONCE
Qty: 0 | Refills: 0 | Status: COMPLETED | OUTPATIENT
Start: 2017-07-29 | End: 2017-07-29

## 2017-07-29 RX ORDER — TRAZODONE HCL 50 MG
300 TABLET ORAL AT BEDTIME
Qty: 0 | Refills: 0 | Status: DISCONTINUED | OUTPATIENT
Start: 2017-07-29 | End: 2017-07-29

## 2017-07-29 RX ADMIN — MORPHINE SULFATE 15 MILLIGRAM(S): 50 CAPSULE, EXTENDED RELEASE ORAL at 14:34

## 2017-07-29 RX ADMIN — PANTOPRAZOLE SODIUM 40 MILLIGRAM(S): 20 TABLET, DELAYED RELEASE ORAL at 06:11

## 2017-07-29 RX ADMIN — PANTOPRAZOLE SODIUM 40 MILLIGRAM(S): 20 TABLET, DELAYED RELEASE ORAL at 17:52

## 2017-07-29 RX ADMIN — DULOXETINE HYDROCHLORIDE 60 MILLIGRAM(S): 30 CAPSULE, DELAYED RELEASE ORAL at 17:53

## 2017-07-29 RX ADMIN — Medication 300 MILLIGRAM(S): at 21:53

## 2017-07-29 RX ADMIN — DULOXETINE HYDROCHLORIDE 60 MILLIGRAM(S): 30 CAPSULE, DELAYED RELEASE ORAL at 06:12

## 2017-07-29 RX ADMIN — GABAPENTIN 400 MILLIGRAM(S): 400 CAPSULE ORAL at 14:04

## 2017-07-29 RX ADMIN — Medication 0.5 MILLIGRAM(S): at 21:52

## 2017-07-29 RX ADMIN — Medication 2: at 10:40

## 2017-07-29 RX ADMIN — INSULIN GLARGINE 21 UNIT(S): 100 INJECTION, SOLUTION SUBCUTANEOUS at 22:02

## 2017-07-29 RX ADMIN — GABAPENTIN 400 MILLIGRAM(S): 400 CAPSULE ORAL at 06:11

## 2017-07-29 RX ADMIN — MIRTAZAPINE 45 MILLIGRAM(S): 45 TABLET, ORALLY DISINTEGRATING ORAL at 21:52

## 2017-07-29 RX ADMIN — MORPHINE SULFATE 15 MILLIGRAM(S): 50 CAPSULE, EXTENDED RELEASE ORAL at 06:11

## 2017-07-29 RX ADMIN — Medication 8: at 14:01

## 2017-07-29 RX ADMIN — MORPHINE SULFATE 15 MILLIGRAM(S): 50 CAPSULE, EXTENDED RELEASE ORAL at 06:45

## 2017-07-29 RX ADMIN — Medication 2: at 21:54

## 2017-07-29 RX ADMIN — SENNA PLUS 1 TABLET(S): 8.6 TABLET ORAL at 21:53

## 2017-07-29 RX ADMIN — Medication 50 MICROGRAM(S): at 06:11

## 2017-07-29 RX ADMIN — Medication 8: at 17:48

## 2017-07-29 RX ADMIN — MORPHINE SULFATE 15 MILLIGRAM(S): 50 CAPSULE, EXTENDED RELEASE ORAL at 14:04

## 2017-07-29 RX ADMIN — Medication 1 APPLICATION(S): at 21:52

## 2017-07-29 RX ADMIN — GABAPENTIN 400 MILLIGRAM(S): 400 CAPSULE ORAL at 21:53

## 2017-07-29 NOTE — PROGRESS NOTE ADULT - SUBJECTIVE AND OBJECTIVE BOX
CIERRA MOODY  61y Female  MRN:[id number]    Patient is a 61y old  Female who presents with a chief complaint of "Black Diarrhea" (28 Jul 2017 09:04)    HPI:  Ms. Moody is a 62 yo woman, independent in ADLs and IADLs w/ HHA 3x/week, w/ AMARO cirrhosis d/b ascites + HE (1 BM/day), also w/ chronic LBP on opiates and anxiety on benzos, w/ hx notable for multiple falls, who presents w/ black diarrhea.     Went to urgent care for what she thought was bacterial sinusitis, treated w/ amoxicillin. 3 days later she developed black diarrhea.  No BRBPR or hematochezia, no vomiting, no spitting up blood. Self d/janett amoxicillin as well as many of her own meds. 7 episodes of diarrhea each day, then 5, then 3, now no more diarrhea she just came in to ED at the advice of her GI doc. No fevers or chills. No CP or SOB.     In ED vitals were notable for tachycardia to 107, otherwise no fever, BP stable at 116/69, 96% on RA. Labs w/ Hgb 8.5 though repeat w/ 10.5 (at baseline), no leukocytosis and chronic thrombocytopenia. BMP w/ mild hyponatremia 134, otherwise Cr .88, bicarb 25. VBG 7.3/58 and chronic transaminitis as well as chronic coagulopathy INR 1.18. No imaging obtained. Given IV PPI 40 and 80, 1L NS. Admitted for scope in setting of what melena.     Of note she lives in a condo w/ 15+ steps, she can't navigate so she takes the elevator. She's fallen multiple times recently, on benzos and opioids as above + encephalopathy inadequately treated w/ 1 BM/day. She fell once recently and "I wasn't found for 3 days." She's now wearing a life alert. No etOH or cigarettes. Recently gained about 40 lbs, says she no longer takes her diuretics and has been taking her home meds on a prn basis. (28 Jul 2017 09:04)      Patient seen and evaluated at bedside. No acute events overnight except as noted.    Interval HPI: s/p EGD yesterday. no BM today    PAST MEDICAL & SURGICAL HISTORY:  TIA (transient ischemic attack): 10/2016 on ASA and Plavix as per Dr. Perez  Syncope and collapse: 10/2016 with long hospital stay at [facility], with negative cardiac work up as per patient, s/p angiogram too with no blockages  as per patient. + right arm nerve damages.  Anxiety and depression  Thrombocytopenia  Constipation, chronic  Anemia  COPD (chronic obstructive pulmonary disease): no recent exacerb  Fall at home  Chronic sinusitis  GERD (gastroesophageal reflux disease)  Lung nodules  Diabetes mellitus: type 2  Fatty liver  Cirrhosis  Anxiety  Hyperlipidemia  Hypertension  Asthma  H/O lumbar discectomy: h/o lumbar laminectomy- 1995  History of laminectomy: L4-5 and S1 in 1998      REVIEW OF SYSTEMS:  Constitutional: No fever, chills, fatigue or weight loss.  Skin: No rash.  Eyes: No recent vision problems or eye pain.  ENT: No congestion, ear pain, or sore throat.  Endocrine: No thyroid problems.  Cardiovascular: No chest pain or palpation.  Respiratory: No cough, shortness of breath, congestion, or wheezing.  Gastrointestinal: as per hpi  Genitourinary: No dysuria.  Musculoskeletal: No joint swelling.  Neurologic: No headache.    Vital Signs Last 24 Hrs  T(C): 37.1 (29 Jul 2017 21:05), Max: 37.2 (29 Jul 2017 04:15)  T(F): 98.8 (29 Jul 2017 21:05), Max: 99 (29 Jul 2017 04:15)  HR: 90 (29 Jul 2017 21:05) (90 - 100)  BP: 124/78 (29 Jul 2017 21:05) (119/60 - 151/85)  BP(mean): --  RR: 18 (29 Jul 2017 21:05) (18 - 18)  SpO2: 95% (29 Jul 2017 21:05) (92% - 96%)    PHYSICAL EXAM:  GENERAL: NAD, well-developed  HEAD:  Atraumatic, Normocephalic  EYES: EOMI, PERRLA, conjunctiva and sclera clear  NECK: Supple, No JVD  CHEST/LUNG: Clear to auscultation bilaterally; No wheeze  HEART: S1, S2; No murmurs, rubs, or gallops  ABDOMEN: Soft, Nontender, distended; Bowel sounds present  EXTREMITIES:  2+ Peripheral Pulses, No clubbing, cyanosis, or edema  PSYCH: Normal affect  NEUROLOGY: AAOX3; non-focal  SKIN: No rashes or lesions    Consultant(s) Notes Reviewed:  [x ] YES  [ ] NO  Care Discussed with Consultants/Other Providers [ x] YES  [ ] NO    MEDICATIONS  (STANDING):  pantoprazole  Injectable 40 milliGRAM(s) IV Push two times a day  gabapentin 400 milliGRAM(s) Oral three times a day  mirtazapine 45 milliGRAM(s) Oral at bedtime  DULoxetine 60 milliGRAM(s) Oral two times a day  insulin glargine Injectable (LANTUS) 21 Unit(s) SubCutaneous at bedtime  atorvastatin 10 milliGRAM(s) Oral at bedtime  tiotropium 18 MICROgram(s) Capsule 1 Capsule(s) Inhalation daily  senna 1 Tablet(s) Oral at bedtime  rifaximin 550 milliGRAM(s) Oral two times a day  levothyroxine 50 MICROGram(s) Oral daily  insulin lispro (HumaLOG) corrective regimen sliding scale   SubCutaneous three times a day before meals  insulin lispro (HumaLOG) corrective regimen sliding scale   SubCutaneous at bedtime  dextrose 5%. 1000 milliLiter(s) (50 mL/Hr) IV Continuous <Continuous>  dextrose 50% Injectable 12.5 Gram(s) IV Push once  dextrose 50% Injectable 25 Gram(s) IV Push once  dextrose 50% Injectable 25 Gram(s) IV Push once  traZODone 300 milliGRAM(s) Oral at bedtime    MEDICATIONS  (PRN):  morphine  IR 15 milliGRAM(s) Oral every 6 hours PRN moderate or severe pain  ALBUTerol    90 MICROgram(s) HFA Inhaler 2 Puff(s) Inhalation every 6 hours PRN Shortness of Breath  cyclobenzaprine 10 milliGRAM(s) Oral three times a day PRN Muscle Spasm  dextrose Gel 1 Dose(s) Oral once PRN Blood Glucose LESS THAN 70 milliGRAM(s)/deciliter  glucagon  Injectable 1 milliGRAM(s) IntraMuscular once PRN Glucose LESS THAN 70 milligrams/deciliter  meclizine 25 milliGRAM(s) Oral daily PRN Dizziness      ALLERGIES:  adhesives (Rash)  Bactrim (Other)  Decadron (Angioedema)  fish (Pruritus)  hydrocortisone (Angioedema)  morphine (Urticaria)  sulfa drugs (Other)                          10.8   3.4   )-----------( 79       ( 29 Jul 2017 18:25 )             32.6   07-29    140  |  103  |  8   ----------------------------<  228<H>  4.3   |  25  |  0.74    Ca    9.3      29 Jul 2017 09:01      < from: Upper Endoscopy (07.28.17 @ 14:49) >  Impression:          - Normal esophagus.                       - Normal stomach.                       - Normal examined duodenum.                       - No specimens collected.                       - Findings on EGD can not explain patient's symptoms  Recommendation:      - Advance diet as tolerated today.                       - [facility].                       - continue PPI PO daily                       -Colonoscopy Monday

## 2017-07-30 LAB
AFP-TM SERPL-MCNC: 3.3 NG/ML — SIGNIFICANT CHANGE UP
ALBUMIN SERPL ELPH-MCNC: 3.4 G/DL — SIGNIFICANT CHANGE UP (ref 3.3–5)
ALP SERPL-CCNC: 169 U/L — HIGH (ref 40–120)
ALT FLD-CCNC: 36 U/L — SIGNIFICANT CHANGE UP (ref 10–45)
ANION GAP SERPL CALC-SCNC: 16 MMOL/L — SIGNIFICANT CHANGE UP (ref 5–17)
AST SERPL-CCNC: 64 U/L — HIGH (ref 10–40)
BILIRUB SERPL-MCNC: 0.4 MG/DL — SIGNIFICANT CHANGE UP (ref 0.2–1.2)
BUN SERPL-MCNC: 8 MG/DL — SIGNIFICANT CHANGE UP (ref 7–23)
CALCIUM SERPL-MCNC: 9.5 MG/DL — SIGNIFICANT CHANGE UP (ref 8.4–10.5)
CHLORIDE SERPL-SCNC: 98 MMOL/L — SIGNIFICANT CHANGE UP (ref 96–108)
CO2 SERPL-SCNC: 25 MMOL/L — SIGNIFICANT CHANGE UP (ref 22–31)
CREAT SERPL-MCNC: 0.81 MG/DL — SIGNIFICANT CHANGE UP (ref 0.5–1.3)
FERRITIN SERPL-MCNC: 20 NG/ML — SIGNIFICANT CHANGE UP (ref 15–150)
FOLATE SERPL-MCNC: 19.4 NG/ML — SIGNIFICANT CHANGE UP (ref 4.8–24.2)
GLUCOSE SERPL-MCNC: 304 MG/DL — HIGH (ref 70–99)
HCT VFR BLD CALC: 31.1 % — LOW (ref 34.5–45)
HCT VFR BLD CALC: 32.9 % — LOW (ref 34.5–45)
HCT VFR BLD CALC: 34.7 % — SIGNIFICANT CHANGE UP (ref 34.5–45)
HCT VFR BLD CALC: 38.7 % — SIGNIFICANT CHANGE UP (ref 34.5–45)
HGB BLD-MCNC: 10 G/DL — LOW (ref 11.5–15.5)
HGB BLD-MCNC: 10.2 G/DL — LOW (ref 11.5–15.5)
HGB BLD-MCNC: 11.3 G/DL — LOW (ref 11.5–15.5)
HGB BLD-MCNC: 12.6 G/DL — SIGNIFICANT CHANGE UP (ref 11.5–15.5)
IRON SATN MFR SERPL: 14 % — SIGNIFICANT CHANGE UP (ref 14–50)
IRON SATN MFR SERPL: 45 UG/DL — SIGNIFICANT CHANGE UP (ref 30–160)
MCHC RBC-ENTMCNC: 23.8 PG — LOW (ref 27–34)
MCHC RBC-ENTMCNC: 26 PG — LOW (ref 27–34)
MCHC RBC-ENTMCNC: 26.1 PG — LOW (ref 27–34)
MCHC RBC-ENTMCNC: 26.3 PG — LOW (ref 27–34)
MCHC RBC-ENTMCNC: 30.4 GM/DL — LOW (ref 32–36)
MCHC RBC-ENTMCNC: 32.7 GM/DL — SIGNIFICANT CHANGE UP (ref 32–36)
MCV RBC AUTO: 78.1 FL — LOW (ref 80–100)
MCV RBC AUTO: 79.7 FL — LOW (ref 80–100)
MCV RBC AUTO: 79.7 FL — LOW (ref 80–100)
MCV RBC AUTO: 80.3 FL — SIGNIFICANT CHANGE UP (ref 80–100)
PLATELET # BLD AUTO: 100 K/UL — LOW (ref 150–400)
PLATELET # BLD AUTO: 113 K/UL — LOW (ref 150–400)
PLATELET # BLD AUTO: 80 K/UL — LOW (ref 150–400)
PLATELET # BLD AUTO: 99 K/UL — LOW (ref 150–400)
POTASSIUM SERPL-MCNC: 4.5 MMOL/L — SIGNIFICANT CHANGE UP (ref 3.5–5.3)
POTASSIUM SERPL-SCNC: 4.5 MMOL/L — SIGNIFICANT CHANGE UP (ref 3.5–5.3)
PROT SERPL-MCNC: 7.6 G/DL — SIGNIFICANT CHANGE UP (ref 6–8.3)
RBC # BLD: 3.87 M/UL — SIGNIFICANT CHANGE UP (ref 3.8–5.2)
RBC # BLD: 4.21 M/UL — SIGNIFICANT CHANGE UP (ref 3.8–5.2)
RBC # BLD: 4.35 M/UL — SIGNIFICANT CHANGE UP (ref 3.8–5.2)
RBC # BLD: 4.85 M/UL — SIGNIFICANT CHANGE UP (ref 3.8–5.2)
RBC # FLD: 15.4 % — HIGH (ref 10.3–14.5)
RBC # FLD: 15.6 % — HIGH (ref 10.3–14.5)
RBC # FLD: 15.8 % — HIGH (ref 10.3–14.5)
RBC # FLD: 16.3 % — HIGH (ref 10.3–14.5)
SODIUM SERPL-SCNC: 139 MMOL/L — SIGNIFICANT CHANGE UP (ref 135–145)
TIBC SERPL-MCNC: 326 UG/DL — SIGNIFICANT CHANGE UP (ref 220–430)
UIBC SERPL-MCNC: 281 UG/DL — SIGNIFICANT CHANGE UP (ref 110–370)
VIT B12 SERPL-MCNC: 1237 PG/ML — HIGH (ref 243–894)
WBC # BLD: 3 K/UL — LOW (ref 3.8–10.5)
WBC # BLD: 3.1 K/UL — LOW (ref 3.8–10.5)
WBC # BLD: 4.2 K/UL — SIGNIFICANT CHANGE UP (ref 3.8–10.5)
WBC # BLD: 4.6 K/UL — SIGNIFICANT CHANGE UP (ref 3.8–10.5)
WBC # FLD AUTO: 3 K/UL — LOW (ref 3.8–10.5)
WBC # FLD AUTO: 3.1 K/UL — LOW (ref 3.8–10.5)
WBC # FLD AUTO: 4.2 K/UL — SIGNIFICANT CHANGE UP (ref 3.8–10.5)
WBC # FLD AUTO: 4.6 K/UL — SIGNIFICANT CHANGE UP (ref 3.8–10.5)

## 2017-07-30 RX ORDER — NYSTATIN CREAM 100000 [USP'U]/G
1 CREAM TOPICAL DAILY
Qty: 0 | Refills: 0 | Status: DISCONTINUED | OUTPATIENT
Start: 2017-07-30 | End: 2017-08-01

## 2017-07-30 RX ORDER — INSULIN GLARGINE 100 [IU]/ML
10 INJECTION, SOLUTION SUBCUTANEOUS ONCE
Qty: 0 | Refills: 0 | Status: COMPLETED | OUTPATIENT
Start: 2017-07-30 | End: 2017-07-30

## 2017-07-30 RX ORDER — SOD SULF/SODIUM/NAHCO3/KCL/PEG
1000 SOLUTION, RECONSTITUTED, ORAL ORAL EVERY 4 HOURS
Qty: 0 | Refills: 0 | Status: COMPLETED | OUTPATIENT
Start: 2017-07-30 | End: 2017-07-30

## 2017-07-30 RX ORDER — MORPHINE SULFATE 50 MG/1
4 CAPSULE, EXTENDED RELEASE ORAL ONCE
Qty: 0 | Refills: 0 | Status: DISCONTINUED | OUTPATIENT
Start: 2017-07-30 | End: 2017-07-30

## 2017-07-30 RX ADMIN — MORPHINE SULFATE 15 MILLIGRAM(S): 50 CAPSULE, EXTENDED RELEASE ORAL at 06:42

## 2017-07-30 RX ADMIN — DULOXETINE HYDROCHLORIDE 60 MILLIGRAM(S): 30 CAPSULE, DELAYED RELEASE ORAL at 05:45

## 2017-07-30 RX ADMIN — GABAPENTIN 400 MILLIGRAM(S): 400 CAPSULE ORAL at 13:21

## 2017-07-30 RX ADMIN — Medication 1000 MILLILITER(S): at 17:59

## 2017-07-30 RX ADMIN — Medication 6: at 09:15

## 2017-07-30 RX ADMIN — Medication 1000 MILLILITER(S): at 21:06

## 2017-07-30 RX ADMIN — DULOXETINE HYDROCHLORIDE 60 MILLIGRAM(S): 30 CAPSULE, DELAYED RELEASE ORAL at 18:01

## 2017-07-30 RX ADMIN — MORPHINE SULFATE 4 MILLIGRAM(S): 50 CAPSULE, EXTENDED RELEASE ORAL at 16:04

## 2017-07-30 RX ADMIN — Medication 8: at 17:59

## 2017-07-30 RX ADMIN — MORPHINE SULFATE 15 MILLIGRAM(S): 50 CAPSULE, EXTENDED RELEASE ORAL at 18:01

## 2017-07-30 RX ADMIN — PANTOPRAZOLE SODIUM 40 MILLIGRAM(S): 20 TABLET, DELAYED RELEASE ORAL at 17:59

## 2017-07-30 RX ADMIN — Medication 300 MILLIGRAM(S): at 22:45

## 2017-07-30 RX ADMIN — PANTOPRAZOLE SODIUM 40 MILLIGRAM(S): 20 TABLET, DELAYED RELEASE ORAL at 05:45

## 2017-07-30 RX ADMIN — MORPHINE SULFATE 15 MILLIGRAM(S): 50 CAPSULE, EXTENDED RELEASE ORAL at 18:31

## 2017-07-30 RX ADMIN — MORPHINE SULFATE 15 MILLIGRAM(S): 50 CAPSULE, EXTENDED RELEASE ORAL at 05:50

## 2017-07-30 RX ADMIN — SENNA PLUS 1 TABLET(S): 8.6 TABLET ORAL at 21:06

## 2017-07-30 RX ADMIN — Medication 50 MICROGRAM(S): at 05:45

## 2017-07-30 RX ADMIN — GABAPENTIN 400 MILLIGRAM(S): 400 CAPSULE ORAL at 05:45

## 2017-07-30 RX ADMIN — Medication 6: at 13:22

## 2017-07-30 RX ADMIN — INSULIN GLARGINE 10 UNIT(S): 100 INJECTION, SOLUTION SUBCUTANEOUS at 22:57

## 2017-07-30 RX ADMIN — GABAPENTIN 400 MILLIGRAM(S): 400 CAPSULE ORAL at 21:06

## 2017-07-30 RX ADMIN — NYSTATIN CREAM 1 APPLICATION(S): 100000 CREAM TOPICAL at 18:01

## 2017-07-30 RX ADMIN — MIRTAZAPINE 45 MILLIGRAM(S): 45 TABLET, ORALLY DISINTEGRATING ORAL at 22:45

## 2017-07-30 RX ADMIN — CYCLOBENZAPRINE HYDROCHLORIDE 10 MILLIGRAM(S): 10 TABLET, FILM COATED ORAL at 11:55

## 2017-07-30 RX ADMIN — MORPHINE SULFATE 4 MILLIGRAM(S): 50 CAPSULE, EXTENDED RELEASE ORAL at 15:34

## 2017-07-30 NOTE — PHYSICAL THERAPY INITIAL EVALUATION ADULT - ACTIVE RANGE OF MOTION EXAMINATION, REHAB EVAL
Left LE Active ROM was WFL (within functional limits)/LUE shld flex limited, reports mm spasm L upper back/Left UE Active ROM was WFL (within functional limits)/Right UE Active ROM was WFL (within functional limits)/Right LE Active ROM was WFL (within functional limits)

## 2017-07-30 NOTE — PHYSICAL THERAPY INITIAL EVALUATION ADULT - DISCHARGE DISPOSITION, PT EVAL
outpatient PT/DC home with outpatient PT services, owns rolling walker/W/C/shower chair/elevator in home, has pvt hire aide x4 days/10hrs, DC plan to be emailed, CM to be notified.

## 2017-07-30 NOTE — PROGRESS NOTE ADULT - SUBJECTIVE AND OBJECTIVE BOX
CIERRA MOODY  61y Female  MRN:[id number]    Patient is a 61y old  Female who presents with a chief complaint of "Black Diarrhea" (28 Jul 2017 09:04)    HPI:  Ms. Moody is a 60 yo woman, independent in ADLs and IADLs w/ HHA 3x/week, w/ AMARO cirrhosis d/b ascites + HE (1 BM/day), also w/ chronic LBP on opiates and anxiety on benzos, w/ hx notable for multiple falls, who presents w/ black diarrhea.     Went to urgent care for what she thought was bacterial sinusitis, treated w/ amoxicillin. 3 days later she developed black diarrhea.  No BRBPR or hematochezia, no vomiting, no spitting up blood. Self d/janett amoxicillin as well as many of her own meds. 7 episodes of diarrhea each day, then 5, then 3, now no more diarrhea she just came in to ED at the advice of her GI doc. No fevers or chills. No CP or SOB.     In ED vitals were notable for tachycardia to 107, otherwise no fever, BP stable at 116/69, 96% on RA. Labs w/ Hgb 8.5 though repeat w/ 10.5 (at baseline), no leukocytosis and chronic thrombocytopenia. BMP w/ mild hyponatremia 134, otherwise Cr .88, bicarb 25. VBG 7.3/58 and chronic transaminitis as well as chronic coagulopathy INR 1.18. No imaging obtained. Given IV PPI 40 and 80, 1L NS. Admitted for scope in setting of what melena.     Of note she lives in a condo w/ 15+ steps, she can't navigate so she takes the elevator. She's fallen multiple times recently, on benzos and opioids as above + encephalopathy inadequately treated w/ 1 BM/day. She fell once recently and "I wasn't found for 3 days." She's now wearing a life alert. No etOH or cigarettes. Recently gained about 40 lbs, says she no longer takes her diuretics and has been taking her home meds on a prn basis. (28 Jul 2017 09:04)      Patient seen and evaluated at bedside. No acute events overnight except as noted.    Interval HPI: no further melena    PAST MEDICAL & SURGICAL HISTORY:  TIA (transient ischemic attack): 10/2016 on ASA and Plavix as per Dr. Perez  Syncope and collapse: 10/2016 with long hospital stay at [facility], with negative cardiac work up as per patient, s/p angiogram too with no blockages  as per patient. + right arm nerve damages.  Anxiety and depression  Thrombocytopenia  Constipation, chronic  Anemia  COPD (chronic obstructive pulmonary disease): no recent exacerb  Fall at home  Chronic sinusitis  GERD (gastroesophageal reflux disease)  Lung nodules  Diabetes mellitus: type 2  Fatty liver  Cirrhosis  Anxiety  Hyperlipidemia  Hypertension  Asthma  H/O lumbar discectomy: h/o lumbar laminectomy- 1995  History of laminectomy: L4-5 and S1 in 1998      REVIEW OF SYSTEMS:  Constitutional: No fever, chills, fatigue or weight loss.  Skin: No rash.  Eyes: No recent vision problems or eye pain.  ENT: No congestion, ear pain, or sore throat.  Endocrine: No thyroid problems.  Cardiovascular: No chest pain or palpation.  Respiratory: No cough, shortness of breath, congestion, or wheezing.  Gastrointestinal: as per hpi  Genitourinary: No dysuria.  Musculoskeletal: No joint swelling.  Neurologic: No headache.    Vital Signs Last 24 Hrs  T(C): 36.8 (30 Jul 2017 09:45), Max: 37.4 (30 Jul 2017 06:05)  T(F): 98.2 (30 Jul 2017 09:45), Max: 99.3 (30 Jul 2017 06:05)  HR: 100 (30 Jul 2017 09:45) (90 - 106)  BP: 139/83 (30 Jul 2017 09:45) (116/72 - 139/83)  BP(mean): --  RR: 18 (30 Jul 2017 09:45) (18 - 18)  SpO2: 97% (30 Jul 2017 09:45) (93% - 97%)    PHYSICAL EXAM:  GENERAL: NAD, well-developed  HEAD:  Atraumatic, Normocephalic  EYES: EOMI, PERRLA, conjunctiva and sclera clear  NECK: Supple, No JVD  CHEST/LUNG: Clear to auscultation bilaterally; No wheeze  HEART: S1, S2; No murmurs, rubs, or gallops  ABDOMEN: Soft, Nontender, distended; Bowel sounds present  EXTREMITIES:  2+ Peripheral Pulses, No clubbing, cyanosis, or edema  PSYCH: Normal affect  NEUROLOGY: AAOX3; non-focal  SKIN: No rashes or lesions    Consultant(s) Notes Reviewed:  [x ] YES  [ ] NO  Care Discussed with Consultants/Other Providers [ x] YES  [ ] NO    MEDICATIONS  (STANDING):  pantoprazole  Injectable 40 milliGRAM(s) IV Push two times a day  gabapentin 400 milliGRAM(s) Oral three times a day  mirtazapine 45 milliGRAM(s) Oral at bedtime  DULoxetine 60 milliGRAM(s) Oral two times a day  insulin glargine Injectable (LANTUS) 21 Unit(s) SubCutaneous at bedtime  atorvastatin 10 milliGRAM(s) Oral at bedtime  tiotropium 18 MICROgram(s) Capsule 1 Capsule(s) Inhalation daily  senna 1 Tablet(s) Oral at bedtime  rifaximin 550 milliGRAM(s) Oral two times a day  levothyroxine 50 MICROGram(s) Oral daily  insulin lispro (HumaLOG) corrective regimen sliding scale   SubCutaneous three times a day before meals  insulin lispro (HumaLOG) corrective regimen sliding scale   SubCutaneous at bedtime  dextrose 5%. 1000 milliLiter(s) (50 mL/Hr) IV Continuous <Continuous>  dextrose 50% Injectable 12.5 Gram(s) IV Push once  dextrose 50% Injectable 25 Gram(s) IV Push once  dextrose 50% Injectable 25 Gram(s) IV Push once  traZODone 300 milliGRAM(s) Oral at bedtime    MEDICATIONS  (PRN):  morphine  IR 15 milliGRAM(s) Oral every 6 hours PRN moderate or severe pain  ALBUTerol    90 MICROgram(s) HFA Inhaler 2 Puff(s) Inhalation every 6 hours PRN Shortness of Breath  cyclobenzaprine 10 milliGRAM(s) Oral three times a day PRN Muscle Spasm  dextrose Gel 1 Dose(s) Oral once PRN Blood Glucose LESS THAN 70 milliGRAM(s)/deciliter  glucagon  Injectable 1 milliGRAM(s) IntraMuscular once PRN Glucose LESS THAN 70 milligrams/deciliter  meclizine 25 milliGRAM(s) Oral daily PRN Dizziness      ALLERGIES:  adhesives (Rash)  Bactrim (Other)  Decadron (Angioedema)  fish (Pruritus)  hydrocortisone (Angioedema)  morphine (Urticaria)  sulfa drugs (Other)                                     12.6   4.2   )-----------( 99 ( 30 Jul 2017 12:17 )             38.7   07-30    139  |  98  |  8   ----------------------------<  304<H>  4.5   |  25  |  0.81    Ca    9.5      30 Jul 2017 08:33    TPro  7.6  /  Alb  3.4  /  TBili  0.4  /  DBili  x   /  AST  64<H>  /  ALT  36  /  AlkPhos  169<H>  07-30      < from: Upper Endoscopy (07.28.17 @ 14:49) >  Impression:          - Normal esophagus.                       - Normal stomach.                       - Normal examined duodenum.                       - No specimens collected.                       - Findings on EGD can not explain patient's symptoms  Recommendation:      - Advance diet as tolerated today.                       - [facility].                       - continue PPI PO daily                       -Colonoscopy Monday

## 2017-07-30 NOTE — PHYSICAL THERAPY INITIAL EVALUATION ADULT - PRECAUTIONS/LIMITATIONS, REHAB EVAL
Pt w/ AMARO cirrhosis d/b ascites + HE (1 BM/day), also w/ chronic LBP on opiates and anxiety on benzos, w/ hx notable for multiple falls. Hospital course: plan for colonoscopy Monday fall precautions/Pt w/ AMARO cirrhosis d/b ascites + HE (1 BM/day), also w/ chronic LBP on opiates and anxiety on benzos, w/ hx notable for multiple falls. Hospital course: plan for colonoscopy Monday

## 2017-07-30 NOTE — PHYSICAL THERAPY INITIAL EVALUATION ADULT - PERTINENT HX OF CURRENT PROBLEM, REHAB EVAL
Pt is a 60 yo woman admitted to [facility] on 7/27/17 for black diarrhea. Pt went to urgent care, for bacterial sinusitis tx with amoxicillin, developed black diarrhea 3 days later. No BRBPR, hematochezia, no vomiting. Pt with hx falls recently, +life alert, on benzos/opioids, +encephalopathy. Upper endo: Normal esophagus.Normal stomach. Normal examined duodenum. No specimens collected.

## 2017-07-31 LAB
24R-OH-CALCIDIOL SERPL-MCNC: 51 NG/ML — SIGNIFICANT CHANGE UP (ref 30–100)
ANION GAP SERPL CALC-SCNC: 14 MMOL/L — SIGNIFICANT CHANGE UP (ref 5–17)
APTT BLD: 30.4 SEC — SIGNIFICANT CHANGE UP (ref 27.5–37.4)
BUN SERPL-MCNC: 5 MG/DL — LOW (ref 7–23)
CALCIUM SERPL-MCNC: 10.1 MG/DL — SIGNIFICANT CHANGE UP (ref 8.4–10.5)
CHLORIDE SERPL-SCNC: 103 MMOL/L — SIGNIFICANT CHANGE UP (ref 96–108)
CO2 SERPL-SCNC: 24 MMOL/L — SIGNIFICANT CHANGE UP (ref 22–31)
CREAT SERPL-MCNC: 0.79 MG/DL — SIGNIFICANT CHANGE UP (ref 0.5–1.3)
GLUCOSE SERPL-MCNC: 172 MG/DL — HIGH (ref 70–99)
HCT VFR BLD CALC: 35.8 % — SIGNIFICANT CHANGE UP (ref 34.5–45)
HCT VFR BLD CALC: 37.7 % — SIGNIFICANT CHANGE UP (ref 34.5–45)
HGB BLD-MCNC: 11.1 G/DL — LOW (ref 11.5–15.5)
HGB BLD-MCNC: 12.1 G/DL — SIGNIFICANT CHANGE UP (ref 11.5–15.5)
IGM SERPL-MCNC: 179 MG/DL — SIGNIFICANT CHANGE UP (ref 40–230)
INR BLD: 1.14 RATIO — SIGNIFICANT CHANGE UP (ref 0.88–1.16)
MCHC RBC-ENTMCNC: 24.2 PG — LOW (ref 27–34)
MCHC RBC-ENTMCNC: 25.7 PG — LOW (ref 27–34)
MCHC RBC-ENTMCNC: 31 GM/DL — LOW (ref 32–36)
MCHC RBC-ENTMCNC: 32.2 GM/DL — SIGNIFICANT CHANGE UP (ref 32–36)
MCV RBC AUTO: 78.2 FL — LOW (ref 80–100)
MCV RBC AUTO: 79.8 FL — LOW (ref 80–100)
PLATELET # BLD AUTO: 111 K/UL — LOW (ref 150–400)
PLATELET # BLD AUTO: 123 K/UL — LOW (ref 150–400)
POTASSIUM SERPL-MCNC: 4.1 MMOL/L — SIGNIFICANT CHANGE UP (ref 3.5–5.3)
POTASSIUM SERPL-SCNC: 4.1 MMOL/L — SIGNIFICANT CHANGE UP (ref 3.5–5.3)
PROTHROM AB SERPL-ACNC: 12.9 SEC — SIGNIFICANT CHANGE UP (ref 10–13.1)
RBC # BLD: 4.58 M/UL — SIGNIFICANT CHANGE UP (ref 3.8–5.2)
RBC # BLD: 4.72 M/UL — SIGNIFICANT CHANGE UP (ref 3.8–5.2)
RBC # FLD: 15.8 % — HIGH (ref 10.3–14.5)
RBC # FLD: 17.1 % — HIGH (ref 10.3–14.5)
SODIUM SERPL-SCNC: 141 MMOL/L — SIGNIFICANT CHANGE UP (ref 135–145)
WBC # BLD: 3.22 K/UL — LOW (ref 3.8–10.5)
WBC # BLD: 5.4 K/UL — SIGNIFICANT CHANGE UP (ref 3.8–10.5)
WBC # FLD AUTO: 3.22 K/UL — LOW (ref 3.8–10.5)
WBC # FLD AUTO: 5.4 K/UL — SIGNIFICANT CHANGE UP (ref 3.8–10.5)

## 2017-07-31 PROCEDURE — 45378 DIAGNOSTIC COLONOSCOPY: CPT | Mod: GC

## 2017-07-31 PROCEDURE — 76705 ECHO EXAM OF ABDOMEN: CPT | Mod: 26

## 2017-07-31 RX ORDER — MORPHINE SULFATE 50 MG/1
4 CAPSULE, EXTENDED RELEASE ORAL ONCE
Qty: 0 | Refills: 0 | Status: DISCONTINUED | OUTPATIENT
Start: 2017-07-31 | End: 2017-07-31

## 2017-07-31 RX ORDER — CLONAZEPAM 1 MG
0.5 TABLET ORAL ONCE
Qty: 0 | Refills: 0 | Status: DISCONTINUED | OUTPATIENT
Start: 2017-07-31 | End: 2017-07-31

## 2017-07-31 RX ADMIN — CYCLOBENZAPRINE HYDROCHLORIDE 10 MILLIGRAM(S): 10 TABLET, FILM COATED ORAL at 11:58

## 2017-07-31 RX ADMIN — Medication 4: at 21:59

## 2017-07-31 RX ADMIN — MORPHINE SULFATE 15 MILLIGRAM(S): 50 CAPSULE, EXTENDED RELEASE ORAL at 21:18

## 2017-07-31 RX ADMIN — Medication 4: at 11:54

## 2017-07-31 RX ADMIN — Medication 50 MICROGRAM(S): at 06:39

## 2017-07-31 RX ADMIN — GABAPENTIN 400 MILLIGRAM(S): 400 CAPSULE ORAL at 06:39

## 2017-07-31 RX ADMIN — INSULIN GLARGINE 21 UNIT(S): 100 INJECTION, SOLUTION SUBCUTANEOUS at 22:00

## 2017-07-31 RX ADMIN — DULOXETINE HYDROCHLORIDE 60 MILLIGRAM(S): 30 CAPSULE, DELAYED RELEASE ORAL at 18:19

## 2017-07-31 RX ADMIN — MORPHINE SULFATE 4 MILLIGRAM(S): 50 CAPSULE, EXTENDED RELEASE ORAL at 18:19

## 2017-07-31 RX ADMIN — Medication 0.5 MILLIGRAM(S): at 22:39

## 2017-07-31 RX ADMIN — NYSTATIN CREAM 1 APPLICATION(S): 100000 CREAM TOPICAL at 11:56

## 2017-07-31 RX ADMIN — MORPHINE SULFATE 15 MILLIGRAM(S): 50 CAPSULE, EXTENDED RELEASE ORAL at 20:48

## 2017-07-31 RX ADMIN — Medication 2: at 18:19

## 2017-07-31 RX ADMIN — GABAPENTIN 400 MILLIGRAM(S): 400 CAPSULE ORAL at 22:00

## 2017-07-31 RX ADMIN — SENNA PLUS 1 TABLET(S): 8.6 TABLET ORAL at 22:00

## 2017-07-31 RX ADMIN — PANTOPRAZOLE SODIUM 40 MILLIGRAM(S): 20 TABLET, DELAYED RELEASE ORAL at 06:39

## 2017-07-31 RX ADMIN — PANTOPRAZOLE SODIUM 40 MILLIGRAM(S): 20 TABLET, DELAYED RELEASE ORAL at 18:19

## 2017-07-31 RX ADMIN — Medication 300 MILLIGRAM(S): at 22:00

## 2017-07-31 RX ADMIN — Medication: at 09:04

## 2017-07-31 RX ADMIN — TIOTROPIUM BROMIDE 1 CAPSULE(S): 18 CAPSULE ORAL; RESPIRATORY (INHALATION) at 11:56

## 2017-07-31 RX ADMIN — DULOXETINE HYDROCHLORIDE 60 MILLIGRAM(S): 30 CAPSULE, DELAYED RELEASE ORAL at 06:39

## 2017-07-31 RX ADMIN — GABAPENTIN 400 MILLIGRAM(S): 400 CAPSULE ORAL at 13:04

## 2017-07-31 RX ADMIN — CYCLOBENZAPRINE HYDROCHLORIDE 10 MILLIGRAM(S): 10 TABLET, FILM COATED ORAL at 22:00

## 2017-07-31 RX ADMIN — MORPHINE SULFATE 15 MILLIGRAM(S): 50 CAPSULE, EXTENDED RELEASE ORAL at 08:52

## 2017-07-31 RX ADMIN — MORPHINE SULFATE 15 MILLIGRAM(S): 50 CAPSULE, EXTENDED RELEASE ORAL at 09:30

## 2017-07-31 RX ADMIN — MIRTAZAPINE 45 MILLIGRAM(S): 45 TABLET, ORALLY DISINTEGRATING ORAL at 22:00

## 2017-07-31 NOTE — PROGRESS NOTE ADULT - SUBJECTIVE AND OBJECTIVE BOX
Pre-Endoscopy Evaluation      Referring Physician:                                Procedure:     Indication for Procedure:    Pertinent History:    Sedation by Anesthesia [x]    PAST MEDICAL & SURGICAL HISTORY:  TIA (transient ischemic attack): 10/2016 on ASA and Plavix as per Dr. Perez  Syncope and collapse: 10/2016 with long hospital stay at [facility], with negative cardiac work up as per patient, s/p angiogram too with no blockages  as per patient. + right arm nerve damages.  Anxiety and depression  Thrombocytopenia  Constipation, chronic  Anemia  COPD (chronic obstructive pulmonary disease): no recent exacerb  Fall at home  Chronic sinusitis  GERD (gastroesophageal reflux disease)  Lung nodules  Diabetes mellitus: type 2  Fatty liver  Cirrhosis  Anxiety  Hyperlipidemia  Hypertension  Asthma  H/O lumbar discectomy: h/o lumbar laminectomy- 1995  History of laminectomy: L4-5 and S1 in 1998      PMH of Gastroparesis [ ]  Gastric Surgery [ ]  Gastric Outlet Obstruction [ ]    Allergies    adhesives (Rash)  Bactrim (Other)  Decadron (Angioedema)  fish (Pruritus)  hydrocortisone (Angioedema)  morphine (Urticaria)  sulfa drugs (Other)    Intolerances        Latex allergy: [ ] yes [ ] no    Medications:MEDICATIONS  (STANDING):  pantoprazole  Injectable 40 milliGRAM(s) IV Push two times a day  gabapentin 400 milliGRAM(s) Oral three times a day  mirtazapine 45 milliGRAM(s) Oral at bedtime  DULoxetine 60 milliGRAM(s) Oral two times a day  insulin glargine Injectable (LANTUS) 21 Unit(s) SubCutaneous at bedtime  atorvastatin 10 milliGRAM(s) Oral at bedtime  tiotropium 18 MICROgram(s) Capsule 1 Capsule(s) Inhalation daily  senna 1 Tablet(s) Oral at bedtime  rifaximin 550 milliGRAM(s) Oral two times a day  levothyroxine 50 MICROGram(s) Oral daily  insulin lispro (HumaLOG) corrective regimen sliding scale   SubCutaneous three times a day before meals  insulin lispro (HumaLOG) corrective regimen sliding scale   SubCutaneous at bedtime  dextrose 5%. 1000 milliLiter(s) (50 mL/Hr) IV Continuous <Continuous>  dextrose 50% Injectable 12.5 Gram(s) IV Push once  dextrose 50% Injectable 25 Gram(s) IV Push once  dextrose 50% Injectable 25 Gram(s) IV Push once  traZODone 300 milliGRAM(s) Oral at bedtime  nystatin Cream 1 Application(s) Topical daily    MEDICATIONS  (PRN):  morphine  IR 15 milliGRAM(s) Oral every 6 hours PRN moderate or severe pain  ALBUTerol    90 MICROgram(s) HFA Inhaler 2 Puff(s) Inhalation every 6 hours PRN Shortness of Breath  cyclobenzaprine 10 milliGRAM(s) Oral three times a day PRN Muscle Spasm  dextrose Gel 1 Dose(s) Oral once PRN Blood Glucose LESS THAN 70 milliGRAM(s)/deciliter  glucagon  Injectable 1 milliGRAM(s) IntraMuscular once PRN Glucose LESS THAN 70 milligrams/deciliter  meclizine 25 milliGRAM(s) Oral daily PRN Dizziness  bisacodyl 10 milliGRAM(s) Oral every 4 hours PRN Constipation      Smoking: [ ] yes  [x] no    AICD/PPM: [ ] yes   [x] no    Pertinent lab data:                        11.1   3.22  )-----------( 123      ( 31 Jul 2017 08:50 )             35.8     07-31    141  |  103  |  5<L>  ----------------------------<  172<H>  4.1   |  24  |  0.79    Ca    10.1      31 Jul 2017 08:42    TPro  7.6  /  Alb  3.4  /  TBili  0.4  /  DBili  x   /  AST  64<H>  /  ALT  36  /  AlkPhos  169<H>  07-30    PT/INR - ( 31 Jul 2017 08:52 )   PT: 12.9 sec;   INR: 1.14 ratio         PTT - ( 31 Jul 2017 08:52 )  PTT:30.4 sec    Physical Examination:       Daily   Vital Signs Last 24 Hrs  T(C): 36.6 (31 Jul 2017 12:59), Max: 37.1 (30 Jul 2017 16:25)  T(F): 97.8 (31 Jul 2017 12:59), Max: 98.8 (30 Jul 2017 16:25)  HR: 100 (31 Jul 2017 12:59) (90 - 105)  BP: 133/79 (31 Jul 2017 12:59) (125/82 - 153/78)  BP(mean): --  RR: 18 (31 Jul 2017 12:59) (18 - 18)  SpO2: 95% (31 Jul 2017 12:59) (94% - 99%)    BP:  133/79        HR:  100      SPO2: 95%     Temperature: 36.6    Constitutional: NAD    HEENT: PERRLA, EOMI,       Neck:  No JVD    Respiratory: CTAB/L    Cardiovascular: S1 and S2    Gastrointestinal: BS+, soft, NT/ND    Extremities: No peripheral edema    Neurological: A/O x 3, no focal deficits    Comments:    ASA Class: I [ ]  II [ ]  III [ ]  IV [ ]    The patient is a suitable candidate for the planned procedure unless box checked [ ]  No, explain: Pre-Endoscopy Evaluation      Referring Physician:    MD Sabas                        Procedure: Colonoscopy    Indication for Procedure: GIB    Pertinent History: 61 year old female with hx AMARO Cirrhosis, Ascites, GERD/gastritis presenting with 4 days of melena and diarrhea.      Sedation by Anesthesia [x]    PAST MEDICAL & SURGICAL HISTORY:  TIA (transient ischemic attack): 10/2016 on ASA and Plavix as per Dr. Perez  Syncope and collapse: 10/2016 with long hospital stay at [facility], with negative cardiac work up as per patient, s/p angiogram too with no blockages  as per patient. + right arm nerve damages.  Anxiety and depression  Thrombocytopenia  Constipation, chronic  Anemia  COPD (chronic obstructive pulmonary disease): no recent exacerb  Fall at home  Chronic sinusitis  GERD (gastroesophageal reflux disease)  Lung nodules  Diabetes mellitus: type 2  Fatty liver  Cirrhosis  Anxiety  Hyperlipidemia  Hypertension  Asthma  H/O lumbar discectomy: h/o lumbar laminectomy- 1995  History of laminectomy: L4-5 and S1 in 1998      PMH of Gastroparesis [ ]  Gastric Surgery [ ]  Gastric Outlet Obstruction [ ] no    Allergies:    adhesives (Rash)  Bactrim (Other)  Decadron (Angioedema)  fish (Pruritus)  hydrocortisone (Angioedema)  morphine (Urticaria)  sulfa drugs (Other)    Intolerances: none    Latex allergy: [ ] yes [x] no    Medications:MEDICATIONS  (STANDING):  pantoprazole  Injectable 40 milliGRAM(s) IV Push two times a day  gabapentin 400 milliGRAM(s) Oral three times a day  mirtazapine 45 milliGRAM(s) Oral at bedtime  DULoxetine 60 milliGRAM(s) Oral two times a day  insulin glargine Injectable (LANTUS) 21 Unit(s) SubCutaneous at bedtime  atorvastatin 10 milliGRAM(s) Oral at bedtime  tiotropium 18 MICROgram(s) Capsule 1 Capsule(s) Inhalation daily  senna 1 Tablet(s) Oral at bedtime  rifaximin 550 milliGRAM(s) Oral two times a day  levothyroxine 50 MICROGram(s) Oral daily  insulin lispro (HumaLOG) corrective regimen sliding scale   SubCutaneous three times a day before meals  insulin lispro (HumaLOG) corrective regimen sliding scale   SubCutaneous at bedtime  dextrose 5%. 1000 milliLiter(s) (50 mL/Hr) IV Continuous <Continuous>  dextrose 50% Injectable 12.5 Gram(s) IV Push once  dextrose 50% Injectable 25 Gram(s) IV Push once  dextrose 50% Injectable 25 Gram(s) IV Push once  traZODone 300 milliGRAM(s) Oral at bedtime  nystatin Cream 1 Application(s) Topical daily    MEDICATIONS  (PRN):  morphine  IR 15 milliGRAM(s) Oral every 6 hours PRN moderate or severe pain  ALBUTerol    90 MICROgram(s) HFA Inhaler 2 Puff(s) Inhalation every 6 hours PRN Shortness of Breath  cyclobenzaprine 10 milliGRAM(s) Oral three times a day PRN Muscle Spasm  dextrose Gel 1 Dose(s) Oral once PRN Blood Glucose LESS THAN 70 milliGRAM(s)/deciliter  glucagon  Injectable 1 milliGRAM(s) IntraMuscular once PRN Glucose LESS THAN 70 milligrams/deciliter  meclizine 25 milliGRAM(s) Oral daily PRN Dizziness  bisacodyl 10 milliGRAM(s) Oral every 4 hours PRN Constipation      Smoking: [ ] yes  [x] no    AICD/PPM: [ ] yes   [x] no    Pertinent lab data:                        11.1   3.22  )-----------( 123      ( 31 Jul 2017 08:50 )             35.8     07-31    141  |  103  |  5<L>  ----------------------------<  172<H>  4.1   |  24  |  0.79    Ca    10.1      31 Jul 2017 08:42    TPro  7.6  /  Alb  3.4  /  TBili  0.4  /  DBili  x   /  AST  64<H>  /  ALT  36  /  AlkPhos  169<H>  07-30    PT/INR - ( 31 Jul 2017 08:52 )   PT: 12.9 sec;   INR: 1.14 ratio         PTT - ( 31 Jul 2017 08:52 )  PTT:30.4 sec    Physical Examination:       Daily   Vital Signs Last 24 Hrs  T(C): 36.6 (31 Jul 2017 12:59), Max: 37.1 (30 Jul 2017 16:25)  T(F): 97.8 (31 Jul 2017 12:59), Max: 98.8 (30 Jul 2017 16:25)  HR: 100 (31 Jul 2017 12:59) (90 - 105)  BP: 133/79 (31 Jul 2017 12:59) (125/82 - 153/78)  BP(mean): --  RR: 18 (31 Jul 2017 12:59) (18 - 18)  SpO2: 95% (31 Jul 2017 12:59) (94% - 99%)    BP:  133/79        HR:  100      SPO2: 95%     Temperature: 36.6    Constitutional: NAD    HEENT: PERRLA, EOMI,       Neck:  No JVD    Respiratory: CTAB/L    Cardiovascular: S1 and S2    Gastrointestinal: BS+, soft, NT/ND    Extremities: No peripheral edema    Neurological: A/O x 3, no focal deficits    Comments:    ASA Class: I [ ]  II [ ]  III [ ]  IV [x]    The patient is a suitable candidate for the planned procedure unless box checked [ ]  No, explain:

## 2017-07-31 NOTE — PROGRESS NOTE ADULT - SUBJECTIVE AND OBJECTIVE BOX
CIERRA MOODY  61y Female  MRN:[id number]    Patient is a 61y old  Female who presents with a chief complaint of "Black Diarrhea" (28 Jul 2017 09:04)    HPI:  Ms. Moody is a 62 yo woman, independent in ADLs and IADLs w/ HHA 3x/week, w/ AMARO cirrhosis d/b ascites + HE (1 BM/day), also w/ chronic LBP on opiates and anxiety on benzos, w/ hx notable for multiple falls, who presents w/ black diarrhea.     Went to urgent care for what she thought was bacterial sinusitis, treated w/ amoxicillin. 3 days later she developed black diarrhea.  No BRBPR or hematochezia, no vomiting, no spitting up blood. Self d/janett amoxicillin as well as many of her own meds. 7 episodes of diarrhea each day, then 5, then 3, now no more diarrhea she just came in to ED at the advice of her GI doc. No fevers or chills. No CP or SOB.     In ED vitals were notable for tachycardia to 107, otherwise no fever, BP stable at 116/69, 96% on RA. Labs w/ Hgb 8.5 though repeat w/ 10.5 (at baseline), no leukocytosis and chronic thrombocytopenia. BMP w/ mild hyponatremia 134, otherwise Cr .88, bicarb 25. VBG 7.3/58 and chronic transaminitis as well as chronic coagulopathy INR 1.18. No imaging obtained. Given IV PPI 40 and 80, 1L NS. Admitted for scope in setting of what melena.     Of note she lives in a condo w/ 15+ steps, she can't navigate so she takes the elevator. She's fallen multiple times recently, on benzos and opioids as above + encephalopathy inadequately treated w/ 1 BM/day. She fell once recently and "I wasn't found for 3 days." She's now wearing a life alert. No etOH or cigarettes. Recently gained about 40 lbs, says she no longer takes her diuretics and has been taking her home meds on a prn basis. (28 Jul 2017 09:04)      Patient seen and evaluated at bedside. No acute events overnight except as noted.    Interval HPI: no further melena    PAST MEDICAL & SURGICAL HISTORY:  TIA (transient ischemic attack): 10/2016 on ASA and Plavix as per Dr. Perez  Syncope and collapse: 10/2016 with long hospital stay at [facility], with negative cardiac work up as per patient, s/p angiogram too with no blockages  as per patient. + right arm nerve damages.  Anxiety and depression  Thrombocytopenia  Constipation, chronic  Anemia  COPD (chronic obstructive pulmonary disease): no recent exacerb  Fall at home  Chronic sinusitis  GERD (gastroesophageal reflux disease)  Lung nodules  Diabetes mellitus: type 2  Fatty liver  Cirrhosis  Anxiety  Hyperlipidemia  Hypertension  Asthma  H/O lumbar discectomy: h/o lumbar laminectomy- 1995  History of laminectomy: L4-5 and S1 in 1998      REVIEW OF SYSTEMS:  Constitutional: No fever, chills, fatigue or weight loss.  Skin: No rash.  Eyes: No recent vision problems or eye pain.  ENT: No congestion, ear pain, or sore throat.  Endocrine: No thyroid problems.  Cardiovascular: No chest pain or palpation.  Respiratory: No cough, shortness of breath, congestion, or wheezing.  Gastrointestinal: as per hpi  Genitourinary: No dysuria.  Musculoskeletal: No joint swelling.  Neurologic: No headache.    Vital Signs Last 24 Hrs  T(C): 36.6 (31 Jul 2017 12:59), Max: 37.1 (30 Jul 2017 16:25)  T(F): 97.8 (31 Jul 2017 12:59), Max: 98.8 (30 Jul 2017 16:25)  HR: 100 (31 Jul 2017 12:59) (90 - 105)  BP: 133/79 (31 Jul 2017 12:59) (125/82 - 153/78)  BP(mean): --  RR: 18 (31 Jul 2017 12:59) (18 - 18)  SpO2: 95% (31 Jul 2017 12:59) (94% - 99%)    PHYSICAL EXAM:  GENERAL: NAD, well-developed  HEAD:  Atraumatic, Normocephalic  EYES: EOMI, PERRLA, conjunctiva and sclera clear  NECK: Supple, No JVD  CHEST/LUNG: Clear to auscultation bilaterally; No wheeze  HEART: S1, S2; No murmurs, rubs, or gallops  ABDOMEN: Soft, Nontender, distended; Bowel sounds present  EXTREMITIES:  2+ Peripheral Pulses, No clubbing, cyanosis, or edema  PSYCH: Normal affect  NEUROLOGY: AAOX3; non-focal  SKIN: No rashes or lesions    Consultant(s) Notes Reviewed:  [x ] YES  [ ] NO  Care Discussed with Consultants/Other Providers [ x] YES  [ ] NO    MEDICATIONS  (STANDING):  pantoprazole  Injectable 40 milliGRAM(s) IV Push two times a day  gabapentin 400 milliGRAM(s) Oral three times a day  mirtazapine 45 milliGRAM(s) Oral at bedtime  DULoxetine 60 milliGRAM(s) Oral two times a day  insulin glargine Injectable (LANTUS) 21 Unit(s) SubCutaneous at bedtime  atorvastatin 10 milliGRAM(s) Oral at bedtime  tiotropium 18 MICROgram(s) Capsule 1 Capsule(s) Inhalation daily  senna 1 Tablet(s) Oral at bedtime  rifaximin 550 milliGRAM(s) Oral two times a day  levothyroxine 50 MICROGram(s) Oral daily  insulin lispro (HumaLOG) corrective regimen sliding scale   SubCutaneous three times a day before meals  insulin lispro (HumaLOG) corrective regimen sliding scale   SubCutaneous at bedtime  dextrose 5%. 1000 milliLiter(s) (50 mL/Hr) IV Continuous <Continuous>  dextrose 50% Injectable 12.5 Gram(s) IV Push once  dextrose 50% Injectable 25 Gram(s) IV Push once  dextrose 50% Injectable 25 Gram(s) IV Push once  traZODone 300 milliGRAM(s) Oral at bedtime  nystatin Cream 1 Application(s) Topical daily    MEDICATIONS  (PRN):  morphine  IR 15 milliGRAM(s) Oral every 6 hours PRN moderate or severe pain  ALBUTerol    90 MICROgram(s) HFA Inhaler 2 Puff(s) Inhalation every 6 hours PRN Shortness of Breath  cyclobenzaprine 10 milliGRAM(s) Oral three times a day PRN Muscle Spasm  dextrose Gel 1 Dose(s) Oral once PRN Blood Glucose LESS THAN 70 milliGRAM(s)/deciliter  glucagon  Injectable 1 milliGRAM(s) IntraMuscular once PRN Glucose LESS THAN 70 milligrams/deciliter  meclizine 25 milliGRAM(s) Oral daily PRN Dizziness  bisacodyl 10 milliGRAM(s) Oral every 4 hours PRN Constipation      ALLERGIES:  adhesives (Rash)  Bactrim (Other)  Decadron (Angioedema)  fish (Pruritus)  hydrocortisone (Angioedema)  morphine (Urticaria)  sulfa drugs (Other)                                                11.1   3.22  )-----------( 123      ( 31 Jul 2017 08:50 )             35.8   07-31    141  |  103  |  5<L>  ----------------------------<  172<H>  4.1   |  24  |  0.79    Ca    10.1      31 Jul 2017 08:42    TPro  7.6  /  Alb  3.4  /  TBili  0.4  /  DBili  x   /  AST  64<H>  /  ALT  36  /  AlkPhos  169<H>  07-30        < from: Upper Endoscopy (07.28.17 @ 14:49) >  Impression:          - Normal esophagus.                       - Normal stomach.                       - Normal examined duodenum.                       - No specimens collected.                       - Findings on EGD can not explain patient's symptoms  Recommendation:      - Advance diet as tolerated today.                       - [facility] to [facility].                       - continue PPI PO daily                       -Colonoscopy Monday

## 2017-07-31 NOTE — PROVIDER CONTACT NOTE (OTHER) - BACKGROUND
Admitted for melena. Q6hrs CBC stable. Pt is difficult stick and as per NP may hold 1200 specimen collection until after patients colonoscopy

## 2017-07-31 NOTE — PROGRESS NOTE ADULT - PROBLEM SELECTOR PLAN 2
- hgb at baseline  - continue to monitor  - 2 large bore IVs  ppi  egd today
- hgb at baseline  - continue to monitor  - 2 large bore IVs  ppi

## 2017-07-31 NOTE — PROGRESS NOTE ADULT - PROBLEM SELECTOR PLAN 1
gi consult noted  no bm today  plan for egd today per gi  ppi
GI f/u  no bm today  EGD neg  plan for colonoscopy monday  ppi
GI f/u  no bm today  EGD neg  plan for colonoscopy tomorrow  ppi
GI f/u  now with nl BM  EGD neg  plan for colonoscopy today per gi  ppi

## 2017-07-31 NOTE — PROGRESS NOTE ADULT - PROBLEM SELECTOR PLAN 6
- lantus 21 from home 42 while NPO  - will add q6 fingerstick and mid dose sliding scale, if inadqeuately controlling will raise to high dose.
insulin as ordered with iss

## 2017-07-31 NOTE — PROGRESS NOTE ADULT - PROBLEM SELECTOR PLAN 5
- she has life alert  - physical therapy  - social work to discuss increase in home services, and [facility] for [facility].   - need to address polypharmacy, big problem with her.

## 2017-07-31 NOTE — PROGRESS NOTE ADULT - PROBLEM SELECTOR PLAN 4
- extensive pain regimen, following w/ pain clinic outside. Will continue gabapentin 400 TID, flexiril tid prn, cymbalta bid, and morphine 15 q6 PRN.

## 2017-07-31 NOTE — PROGRESS NOTE ADULT - ASSESSMENT
Ms. Moody is a 62 yo woman, independent in ADLs and IADLs w/ HHA 3x/week, w/ AMARO cirrhosis d/b ascites + HE (1 BM/day), also w/ chronic LBP on opiates and anxiety on benzos, w/ hx notable for multiple falls, who presents w/ melena
Ms. Moody is a 60 yo woman, independent in ADLs and IADLs w/ HHA 3x/week, w/ AMARO cirrhosis d/b ascites + HE (1 BM/day), also w/ chronic LBP on opiates and anxiety on benzos, w/ hx notable for multiple falls, who presents w/ melena
Ms. Moody is a 62 yo woman, independent in ADLs and IADLs w/ HHA 3x/week, w/ AMARO cirrhosis d/b ascites + HE (1 BM/day), also w/ chronic LBP on opiates and anxiety on benzos, w/ hx notable for multiple falls, who presents w/ melena
Ms. Moody is a 62 yo woman, independent in ADLs and IADLs w/ HHA 3x/week, w/ AMARO cirrhosis d/b ascites + HE (1 BM/day), also w/ chronic LBP on opiates and anxiety on benzos, w/ hx notable for multiple falls, who presents w/ melena

## 2017-07-31 NOTE — PROGRESS NOTE ADULT - PROBLEM SELECTOR PLAN 3
stable   f/u with gi
stable   f/u with gi    ascites  f/u with gi regarding possible paracenthesis
stable   f/u with gi    ascites  f/u with gi regarding possible paracenthesis  check US abd
stable   f/u with gi    ascites  f/u with gi regarding possible paracenthesis  check US abd

## 2017-07-31 NOTE — PROGRESS NOTE ADULT - PROBLEM SELECTOR PLAN 7
- major issue with her, iatrogenic component to her falls and frailty is likely as important as her cirrhosis.  -  would d/c extensive bowel regimen and stick to lactulose/miralax when she starts taking again.  - pain regimen needs to be tightened as well, multiple serotonergic meds on board.  - would address chronic opioid regimen once she gets her spinal cord stimulator implanted soon, also address need for qHS benzos.

## 2017-08-01 ENCOUNTER — TRANSCRIPTION ENCOUNTER (OUTPATIENT)
Age: 62
End: 2017-08-01

## 2017-08-01 ENCOUNTER — OTHER (OUTPATIENT)
Age: 62
End: 2017-08-01

## 2017-08-01 VITALS
RESPIRATION RATE: 18 BRPM | SYSTOLIC BLOOD PRESSURE: 137 MMHG | HEART RATE: 89 BPM | TEMPERATURE: 99 F | OXYGEN SATURATION: 98 % | DIASTOLIC BLOOD PRESSURE: 86 MMHG

## 2017-08-01 LAB
ANA TITR SER: NEGATIVE — SIGNIFICANT CHANGE UP
ANION GAP SERPL CALC-SCNC: 13 MMOL/L — SIGNIFICANT CHANGE UP (ref 5–17)
BUN SERPL-MCNC: 10 MG/DL — SIGNIFICANT CHANGE UP (ref 7–23)
CALCIUM SERPL-MCNC: 9.6 MG/DL — SIGNIFICANT CHANGE UP (ref 8.4–10.5)
CHLORIDE SERPL-SCNC: 105 MMOL/L — SIGNIFICANT CHANGE UP (ref 96–108)
CO2 SERPL-SCNC: 16 MMOL/L — LOW (ref 22–31)
CREAT SERPL-MCNC: 0.92 MG/DL — SIGNIFICANT CHANGE UP (ref 0.5–1.3)
DSDNA AB SER-ACNC: <12 IU/ML — SIGNIFICANT CHANGE UP
GLUCOSE SERPL-MCNC: 280 MG/DL — HIGH (ref 70–99)
HCT VFR BLD CALC: 36.1 % — SIGNIFICANT CHANGE UP (ref 34.5–45)
HGB BLD-MCNC: 11.2 G/DL — LOW (ref 11.5–15.5)
MCHC RBC-ENTMCNC: 25.1 PG — LOW (ref 27–34)
MCHC RBC-ENTMCNC: 31.1 GM/DL — LOW (ref 32–36)
MCV RBC AUTO: 80.8 FL — SIGNIFICANT CHANGE UP (ref 80–100)
PLATELET # BLD AUTO: 110 K/UL — LOW (ref 150–400)
POTASSIUM SERPL-MCNC: 5.7 MMOL/L — HIGH (ref 3.5–5.3)
POTASSIUM SERPL-SCNC: 5.7 MMOL/L — HIGH (ref 3.5–5.3)
RBC # BLD: 4.46 M/UL — SIGNIFICANT CHANGE UP (ref 3.8–5.2)
RBC # FLD: 16.3 % — HIGH (ref 10.3–14.5)
SMOOTH MUSCLE AB SER-ACNC: SIGNIFICANT CHANGE UP
SODIUM SERPL-SCNC: 134 MMOL/L — LOW (ref 135–145)
WBC # BLD: 2.8 K/UL — LOW (ref 3.8–10.5)
WBC # FLD AUTO: 2.8 K/UL — LOW (ref 3.8–10.5)

## 2017-08-01 PROCEDURE — 82105 ALPHA-FETOPROTEIN SERUM: CPT

## 2017-08-01 PROCEDURE — 82652 VIT D 1 25-DIHYDROXY: CPT

## 2017-08-01 PROCEDURE — 84132 ASSAY OF SERUM POTASSIUM: CPT

## 2017-08-01 PROCEDURE — 82607 VITAMIN B-12: CPT

## 2017-08-01 PROCEDURE — 86900 BLOOD TYPING SEROLOGIC ABO: CPT

## 2017-08-01 PROCEDURE — 83605 ASSAY OF LACTIC ACID: CPT

## 2017-08-01 PROCEDURE — 80048 BASIC METABOLIC PNL TOTAL CA: CPT

## 2017-08-01 PROCEDURE — 96374 THER/PROPH/DIAG INJ IV PUSH: CPT

## 2017-08-01 PROCEDURE — 76705 ECHO EXAM OF ABDOMEN: CPT

## 2017-08-01 PROCEDURE — 84443 ASSAY THYROID STIM HORMONE: CPT

## 2017-08-01 PROCEDURE — 83036 HEMOGLOBIN GLYCOSYLATED A1C: CPT

## 2017-08-01 PROCEDURE — 82947 ASSAY GLUCOSE BLOOD QUANT: CPT

## 2017-08-01 PROCEDURE — 86038 ANTINUCLEAR ANTIBODIES: CPT

## 2017-08-01 PROCEDURE — 82784 ASSAY IGA/IGD/IGG/IGM EACH: CPT

## 2017-08-01 PROCEDURE — 86850 RBC ANTIBODY SCREEN: CPT

## 2017-08-01 PROCEDURE — 82728 ASSAY OF FERRITIN: CPT

## 2017-08-01 PROCEDURE — 85730 THROMBOPLASTIN TIME PARTIAL: CPT

## 2017-08-01 PROCEDURE — 80053 COMPREHEN METABOLIC PANEL: CPT

## 2017-08-01 PROCEDURE — 85610 PROTHROMBIN TIME: CPT

## 2017-08-01 PROCEDURE — 82803 BLOOD GASES ANY COMBINATION: CPT

## 2017-08-01 PROCEDURE — 94640 AIRWAY INHALATION TREATMENT: CPT

## 2017-08-01 PROCEDURE — 82746 ASSAY OF FOLIC ACID SERUM: CPT

## 2017-08-01 PROCEDURE — 85027 COMPLETE CBC AUTOMATED: CPT

## 2017-08-01 PROCEDURE — 83550 IRON BINDING TEST: CPT

## 2017-08-01 PROCEDURE — 85014 HEMATOCRIT: CPT

## 2017-08-01 PROCEDURE — 86225 DNA ANTIBODY NATIVE: CPT

## 2017-08-01 PROCEDURE — 86255 FLUORESCENT ANTIBODY SCREEN: CPT

## 2017-08-01 PROCEDURE — 97161 PT EVAL LOW COMPLEX 20 MIN: CPT

## 2017-08-01 PROCEDURE — 84295 ASSAY OF SERUM SODIUM: CPT

## 2017-08-01 PROCEDURE — 82306 VITAMIN D 25 HYDROXY: CPT

## 2017-08-01 PROCEDURE — 82435 ASSAY OF BLOOD CHLORIDE: CPT

## 2017-08-01 PROCEDURE — 99285 EMERGENCY DEPT VISIT HI MDM: CPT | Mod: 25

## 2017-08-01 PROCEDURE — 82330 ASSAY OF CALCIUM: CPT

## 2017-08-01 PROCEDURE — 86901 BLOOD TYPING SEROLOGIC RH(D): CPT

## 2017-08-01 RX ORDER — GABAPENTIN 400 MG/1
1 CAPSULE ORAL
Qty: 0 | Refills: 0 | COMMUNITY

## 2017-08-01 RX ORDER — MECLIZINE HCL 12.5 MG
1 TABLET ORAL
Qty: 0 | Refills: 0 | COMMUNITY
Start: 2017-08-01

## 2017-08-01 RX ORDER — FUROSEMIDE 40 MG
1 TABLET ORAL
Qty: 0 | Refills: 0 | COMMUNITY

## 2017-08-01 RX ORDER — MORPHINE SULFATE 50 MG/1
15 CAPSULE, EXTENDED RELEASE ORAL ONCE
Qty: 0 | Refills: 0 | Status: DISCONTINUED | OUTPATIENT
Start: 2017-08-01 | End: 2017-08-01

## 2017-08-01 RX ADMIN — MORPHINE SULFATE 15 MILLIGRAM(S): 50 CAPSULE, EXTENDED RELEASE ORAL at 00:54

## 2017-08-01 RX ADMIN — Medication 6: at 09:19

## 2017-08-01 RX ADMIN — MORPHINE SULFATE 15 MILLIGRAM(S): 50 CAPSULE, EXTENDED RELEASE ORAL at 10:04

## 2017-08-01 RX ADMIN — DULOXETINE HYDROCHLORIDE 60 MILLIGRAM(S): 30 CAPSULE, DELAYED RELEASE ORAL at 06:47

## 2017-08-01 RX ADMIN — GABAPENTIN 400 MILLIGRAM(S): 400 CAPSULE ORAL at 06:47

## 2017-08-01 RX ADMIN — TIOTROPIUM BROMIDE 1 CAPSULE(S): 18 CAPSULE ORAL; RESPIRATORY (INHALATION) at 12:39

## 2017-08-01 RX ADMIN — PANTOPRAZOLE SODIUM 40 MILLIGRAM(S): 20 TABLET, DELAYED RELEASE ORAL at 06:47

## 2017-08-01 RX ADMIN — CYCLOBENZAPRINE HYDROCHLORIDE 10 MILLIGRAM(S): 10 TABLET, FILM COATED ORAL at 12:35

## 2017-08-01 RX ADMIN — GABAPENTIN 400 MILLIGRAM(S): 400 CAPSULE ORAL at 13:49

## 2017-08-01 RX ADMIN — Medication 10: at 12:35

## 2017-08-01 RX ADMIN — ALBUTEROL 2 PUFF(S): 90 AEROSOL, METERED ORAL at 10:17

## 2017-08-01 RX ADMIN — MORPHINE SULFATE 15 MILLIGRAM(S): 50 CAPSULE, EXTENDED RELEASE ORAL at 09:34

## 2017-08-01 RX ADMIN — NYSTATIN CREAM 1 APPLICATION(S): 100000 CREAM TOPICAL at 12:36

## 2017-08-01 RX ADMIN — Medication 50 MICROGRAM(S): at 06:47

## 2017-08-01 NOTE — DISCHARGE NOTE ADULT - MEDICATION SUMMARY - MEDICATIONS TO TAKE
I will START or STAY ON the medications listed below when I get home from the hospital:    Symbicort 160/4.5 mcg Inhaler  -- 1 puff(s) by mouth 2 times a day, As Needed  -- Indication: For Asthma    Magnesium 400mg tablet  -- 1 tab(s) by mouth once a day  -- Indication: For supplement    Hydromorphone 4mg tablet  -- 1 tab(s) by mouth every 4 hours, As Needed  -- Indication: For Pain    morphine 15mg tablet  -- 1 tab(s) by mouth every 4 hours, As Needed  -- Indication: For Pain    B-Complex tablet  -- 1 tab(s) by mouth once a day  -- Indication: For supplement    Lyrica 50 mg oral capsule  -- 1 cap(s) by mouth 3 times a day  -- Indication: For neuropathy    gabapentin 400 mg oral capsule  -- 1 cap(s) by mouth 3 times a day  -- Indication: For neuropathy    KlonoPIN 0.5 mg oral tablet  -- 3 tab(s) by mouth once a day (at bedtime)  -- Indication: For Anxiety    mirtazapine 45 mg oral tablet  -- 1 tab(s) by mouth once a day (at bedtime)  -- Indication: For insomnia    Cymbalta 60 mg oral delayed release capsule  -- 1 cap(s) by mouth 2 times a day  -- Indication: For Depression    traZODone 100 mg oral tablet  -- 3 tab(s) by mouth once a day (at bedtime)  -- Indication: For Depression    Lantus Solostar Pen 100 units/mL subcutaneous solution  -- 42 unit(s) subcutaneously once a day (at bedtime)  -- Indication: For Diabetes mellitus, type 2    insulin lispro 100 units/mL subcutaneous solution  -- 12 unit(s) subcutaneous 3 times a day per sliding scale (before meals)  -- Indication: For Diabetes mellitus, type 2    meclizine 25 mg oral tablet  -- 1 tab(s) by mouth once a day, As needed, Dizziness  -- Indication: For Dizziness    Xyzal 5 mg oral tablet  -- 1 tab(s) by mouth once a day  -- Indication: For Allergic rhinitis    Zetia 10 mg oral tablet  -- 1 tab(s) by mouth once a day  -- Indication: For hyperlipidemia    Rexulti 2 mg oral tablet  -- 1 tab(s) by mouth once a day (in the morning)  -- Indication: For Depression    ProAir HFA 90 mcg/inh inhalation aerosol  -- 2 puff(s) inhaled , As Needed  -- Indication: For Asthma    albuterol 1.25 mg/3 mL (0.042%) inhalation solution  -- 3 milliliter(s) inhaled , As Needed  -- Indication: For Asthma    polyethylene glycol 3350 oral powder for reconstitution  -- 17 gram(s) by mouth once a day, As Needed  -- Indication: For Constipation    Metamucil  -- 30 milliliter(s) by mouth once a day, As Needed  -- Indication: For Constipation    lactulose 10 g/15 mL oral syrup  -- 15 milliliter(s) by mouth once a day, As Needed  -- Indication: For Constipation    docusate sodium 100 mg oral capsule  -- 3 cap(s) by mouth once a day (in the evening)  -- Indication: For Constipation    senna oral tablet  -- 3 tab(s) by mouth once a day (in the morning), As Needed  -- Indication: For Constipation    Singulair 10 mg oral tablet  -- 1 tab(s) by mouth once a day (in the evening)  -- Indication: For Asthma    Xifaxan 550 mg oral tablet  -- 1 tab(s) by mouth 2 times a day  -- Indication: For gastro yuval    cyclobenzaprine 10 mg oral tablet  -- 1 tab(s) by mouth 3 times a day, As Needed  -- Indication: For Allergy    Culturelle Digestive Health oral capsule  -- 1 cap(s) by mouth once a day  -- Indication: For Digestive aide    NexIUM 20 mg oral delayed release capsule  -- 1 cap(s) by mouth once a day  -- Indication: For Dyspepsia    Synthroid 50 mcg (0.05 mg) oral tablet  -- 1 tab(s) by mouth once a day  -- Indication: For hypothyroid    Vitamin D3 50,000 intl units oral capsule  -- 1 cap(s) by mouth once a week  -- Indication: For supplement

## 2017-08-01 NOTE — DISCHARGE NOTE ADULT - CARE PLAN
Principal Discharge DX:	Black stool  Goal:	no bleeding  Instructions for follow-up, activity and diet:	Colonoscopy showed no bleeding, normal test  Call Gastroenterologists for a follow outpatient capsule study as discussed  Secondary Diagnosis:	Anemia  Goal:	no drop in numbers  Instructions for follow-up, activity and diet:	CBC was stable. Follow up with your physician  Secondary Diagnosis:	Asthma  Goal:	stable  Instructions for follow-up, activity and diet:	Continue current medications and follow up with your physician  Secondary Diagnosis:	Diabetes mellitus, type 2  Goal:	HA1C< 7 Principal Discharge DX:	Black stool  Goal:	no bleeding  Instructions for follow-up, activity and diet:	Colonoscopy showed no bleeding, normal test  Call Gastroenterologists for a follow outpatient capsule study as discussed  Secondary Diagnosis:	Anemia  Goal:	no drop in numbers  Instructions for follow-up, activity and diet:	CBC, white count and platelets was stable. Follow up with your physician  Secondary Diagnosis:	Asthma  Goal:	stable  Instructions for follow-up, activity and diet:	Continue current medications and follow up with your physician  Secondary Diagnosis:	Diabetes mellitus, type 2  Goal:	HA1C< 7 Principal Discharge DX:	Black stool  Goal:	no bleeding  Instructions for follow-up, activity and diet:	Colonoscopy showed no bleeding, normal test  Call Gastroenterologists for a follow outpatient capsule study as discussed  Secondary Diagnosis:	Anemia  Goal:	no drop in numbers  Instructions for follow-up, activity and diet:	CBC, white count and platelets was stable. Follow up with your physician  wbc 2.8 H/H 11.2/ 36.1 platelets 110  Secondary Diagnosis:	Asthma  Goal:	stable  Instructions for follow-up, activity and diet:	Continue current medications and follow up with your physician  Secondary Diagnosis:	Diabetes mellitus, type 2  Goal:	HA1C< 7  Instructions for follow-up, activity and diet:	Continue meds as above  Keep endocrine appointment scheduled for next week

## 2017-08-01 NOTE — DISCHARGE NOTE ADULT - PATIENT PORTAL LINK FT
“You can access the Follow[facility] Patient Portal, offered by [facility], by registering with the following website: [email or web address]”

## 2017-08-01 NOTE — DISCHARGE NOTE ADULT - CARE PROVIDER_API CALL
titi Banks  primary care physician  Phone: (   )    -  Fax: (   )    -    Marissa Perez), Gastroenterology; Internal Medicine  [address] [place] [address]  Phone: [phone]  Fax: [phone]

## 2017-08-01 NOTE — DISCHARGE NOTE ADULT - PLAN OF CARE
no bleeding Colonoscopy showed no bleeding, normal test  Call Gastroenterologists for a follow outpatient capsule study as discussed no drop in numbers CBC was stable. Follow up with your physician stable Continue current medications and follow up with your physician HA1C< 7 CBC, white count and platelets was stable. Follow up with your physician Continue meds as above  Keep endocrine appointment scheduled for next week CBC, white count and platelets was stable. Follow up with your physician  wbc 2.8 H/H 11.2/ 36.1 platelets 110

## 2017-08-01 NOTE — DISCHARGE NOTE ADULT - MEDICATION SUMMARY - MEDICATIONS TO STOP TAKING
I will STOP taking the medications listed below when I get home from the hospital:    Furosemide 20mg tablet  -- 1 tab(s) by mouth once a day for 1 week

## 2017-08-01 NOTE — DISCHARGE NOTE ADULT - HOSPITAL COURSE
to be completed by attending ED - protonix 80 IV x 1, then protonix 40 IV BID   07/30: pt  8/1    GI Dr Mark Anthony eDlgado:                      - The entire examined colon is normal.                       No sign of active or recent bleeding                       - No etiology of prior GI bleed found on colonoscopy.                       [facility] for possible outpatient capsule study

## 2017-08-01 NOTE — DISCHARGE NOTE ADULT - PROVIDER TOKENS
FREE:[LAST:[Dr Baron],FIRST:[titi],PHONE:[(   )    -],FAX:[(   )    -],ADDRESS:[primary care physician]],TOKEN:'[id number]'

## 2017-08-07 ENCOUNTER — APPOINTMENT (OUTPATIENT)
Dept: NEUROLOGY | Facility: CLINIC | Age: 62
End: 2017-08-07

## 2017-08-23 ENCOUNTER — APPOINTMENT (OUTPATIENT)
Dept: ENDOCRINOLOGY | Facility: CLINIC | Age: 62
End: 2017-08-23

## 2017-08-24 ENCOUNTER — EMERGENCY (EMERGENCY)
Facility: HOSPITAL | Age: 62
LOS: 1 days | End: 2017-08-24
Attending: EMERGENCY MEDICINE | Admitting: EMERGENCY MEDICINE
Payer: MEDICARE

## 2017-08-24 VITALS
HEART RATE: 91 BPM | RESPIRATION RATE: 18 BRPM | SYSTOLIC BLOOD PRESSURE: 137 MMHG | DIASTOLIC BLOOD PRESSURE: 78 MMHG | OXYGEN SATURATION: 97 % | TEMPERATURE: 98 F

## 2017-08-24 PROCEDURE — 99283 EMERGENCY DEPT VISIT LOW MDM: CPT

## 2017-08-24 PROCEDURE — 99283 EMERGENCY DEPT VISIT LOW MDM: CPT | Mod: 25

## 2017-08-24 NOTE — ED PROVIDER NOTE - OBJECTIVE STATEMENT
61/F with T2DM, AMARO c/b ascites and hepatic encephalopathy, chronic LBP, anxiety, opiate and benzo use, comes to ED complaining of "black boil" on her left foot that she noticed after her HHA applied antifungal cream to her foot and put a sock over it. She denies fever, chills, nausea, vomiting, abdominal pain, dysuria, diarrhea. She also complains of pruritic, red lesions on her foot around the boil and on her hands. She asks for IV morphine.

## 2017-08-24 NOTE — ED PROVIDER NOTE - MEDICAL DECISION MAKING DETAILS
61/F with T2DM, AMARO c/b ascites and hepatic encephalopathy, chronic LBP, anxiety, opiate and benzo use, comes to ED complaining of blister on her right foot. Pt said that she has chronic foot fungus and diabetic neuropathy. Pt put bacitracin on foot and fell asleep - when she woke up pt noticed blister on heel where she was putting pressure on her foot. no pain. no fevers. no other symptoms. on exam pt with half dollar sized blister on right heel. no erythema. no pain. no signs of abscess or cellulitis. normal pulses. Blister - no need for I/D or opening as blister is sterile do not want to introduce infection. Will wrap with bulky dressing rec for lotramin and podiatry and will d./c home. Wei Aden M.D., Attending Physician

## 2017-08-24 NOTE — ED ADULT NURSE NOTE - OBJECTIVE STATEMENT
62 y/o female with multiple medical problems presents to ED via EMS for blister on bottom of right foot. Pt states she has had fungus on both feet and blister which is causing her pain. Pt states she has not seen primary doctor for this issue. Pt lungs clear b/l. Skin warm, dry. Multiple skin lesions to arms noted. FUngues noted to b/l feet. Blister by right heel noted. No drainage or bleeding noted. Gross motor and neuro intact.

## 2017-08-24 NOTE — ED PROVIDER NOTE - PMH
Anemia    Anxiety    Anxiety and depression    Asthma    Back ache    Chronic sinusitis    Cirrhosis    Constipation, chronic    COPD (chronic obstructive pulmonary disease)  no recent exacerb  Diabetes mellitus  type 2  Fall at home    Fatty liver    GERD (gastroesophageal reflux disease)    Hyperlipidemia    Hypertension    Lung nodules    Syncope and collapse  10/2016 with long hospital stay at [facility], with negative cardiac work up as per patient, s/p angiogram too with no blockages  as per patient. + right arm nerve damages.  Thrombocytopenia    TIA (transient ischemic attack)  10/2016 on ASA and Plavix as per Dr. Perez

## 2017-08-24 NOTE — ED ADULT NURSE NOTE - PMH
Anemia    Anxiety    Anxiety and depression    Asthma    Back ache    Chronic sinusitis    Cirrhosis    Constipation, chronic    COPD (chronic obstructive pulmonary disease)  no recent exacerb  Diabetes mellitus  type 2  Fall at home    Fatty liver    GERD (gastroesophageal reflux disease)    Hyperlipidemia    Hypertension    Lung nodules    Syncope and collapse  10/2016 with long hospital stay at [facility], with negative cardiac work up as per patient, s/p angiogram too with no blockages  as per patient. + right arm nerve damages.  Thrombocytopenia    TIA (transient ischemic attack)  10/2016 on ASA and Plavix as per Dr. Perez

## 2017-08-24 NOTE — ED PROVIDER NOTE - PLAN OF CARE
1. return for worsening symptoms or anything concerning to you  2. take all home meds as prescribed  3. follow up with your pmd call to make an appointment  4. follow up with podiatry call [phone]  5 use lotramin ultra otc as directed

## 2017-08-24 NOTE — ED PROVIDER NOTE - PHYSICAL EXAMINATION
Constitutional: NAD; disheveled, poor hygiene   Eyes: clear conjunctiva  ENMT: moist oral mucosa  Respiratory: CTA b/l  Cardiovascular: S1, S2, RR  Gastrointestinal: soft, NT, ND  Extremities: no LE edema  Neurological: AAO x 3  Skin: fluid filled blister on right heel. Erythematous macules on hands and feet.
bilateral upper extremities/bilateral lower extremities/normal

## 2017-08-24 NOTE — ED ADULT NURSE NOTE - CHPI ED SYMPTOMS NEG
no numbness/no fever/no nausea/no vomiting/no decreased eating/drinking/no chills/no tingling/no dizziness

## 2017-08-24 NOTE — ED PROVIDER NOTE - NS ED ROS FT
General: no fever, no chills  Ophthalmologic: no change in vision  ENMT: no sore throat  Respiratory and Thorax: no SOB, no cough  Cardiovascular: no CP  Gastrointestinal: no abd pain, N/V/D  Genitourinary: no dysuria  Musculoskeletal: no joint pain  Neurological: no HA  Psychiatric: no anxiety/depression  Hematology/Lymphatics: no abnormal bleeding  Endocrine: elevated FS; patient inconsistently takes insulin  Skin: boil on right heel

## 2017-08-24 NOTE — ED PROVIDER NOTE - CARE PLAN
Principal Discharge DX:	Blister of foot, right Principal Discharge DX:	Blister of foot, right  Instructions for follow-up, activity and diet:	1. return for worsening symptoms or anything concerning to you  2. take all home meds as prescribed  3. follow up with your pmd call to make an appointment  4. follow up with podiatry call [phone]  5 use lotramin ultra otc as directed

## 2017-08-24 NOTE — ED PROVIDER NOTE - ATTENDING CONTRIBUTION TO CARE
61/F with T2DM, AMARO c/b ascites and hepatic encephalopathy, chronic LBP, anxiety, opiate and benzo use, comes to ED complaining of blister on her right foot. Pt said that she has chronic foot fungus and diabetic neuropathy. Pt put bacitracin on foot and fell asleep - when she woke up pt noticed blister on heel where she was putting pressure on her foot. no pain. no fevers. no other symptoms. on exam pt with half dollar sized blister on right heel. no erythema. no pain. no signs of abscess or cellulitis. normal pulses. Blister - no need for I/D or opening as blister is sterile do not want to introduce infection. Will wrap with bulky dressing rec for lotramin and podiatry and will d./c home.     Constitutional: No fever or chills  Eyes: No visual changes  HEENT: No throat pain  CV: No chest pain  Resp: No SOB no cough  GI: No abd pain, nausea or vomiting  : No dysuria  MSK: No musculoskeletal pain  Skin: blister on right heel  Neuro: No headache     Constitutional: NAD AAOx3  Eyes: PERRLA EOMI  Head: Normocephalic atraumatic  Mouth: MMM  Cardiac: regular rate   Resp: Lungs CTAB  GI: Abd s/nt/nd  Neuro: CN2-12 intact  Skin: blister to right heel no surround cellulitis. no concern for abscess. normal pulses.   Wei Aden M.D., Attending Physician

## 2017-08-29 ENCOUNTER — APPOINTMENT (OUTPATIENT)
Dept: ENDOCRINOLOGY | Facility: CLINIC | Age: 62
End: 2017-08-29

## 2017-09-12 NOTE — DIETITIAN INITIAL EVALUATION ADULT. - PROBLEM SELECTOR PLAN 3
Acute on chronic back pain. Hx of Laminectomy with worsening disc herniation and stenosis. Per patient, states that she had acute exacerbation in last hospitalization and was given Morphine IV in last admission.  Istop Ref # [id number]  Continue home regimen of Oxycodone 15 q6h PRN, Cyclobenazaprine 10 TID PRN, Neurotonin 300 mg TID  Short course of Morphine 4 mg IV prn for severe pain.  Primary day team to contact Pain management MD with regards to regimen Wheelchair

## 2017-10-25 NOTE — H&P PST ADULT - VENOUS THROMBOEMBOLISM CURRENT STATUS
Follow up with Employee health within 48-72 hrs.  Take all of your medications as previously prescribed. Give the yellow packet to the Nursing staff for the source patient.   Keep the area dry and clean.   Return for any concerns.
minor surgery planned

## 2017-10-26 NOTE — DISCHARGE NOTE ADULT - HOSPITAL COURSE
60 yo woman with PMH of  HTN, HLD, COPD (not on home O2), liver cirrhosis 2/2 AMARO, hypothyroid, anxiety/depression (documented), Lumbar Laminectomy with disk herniation, TIAs, brought in from home in setting of near syncope. Patient states that she was feeling worsening chronic lower back pain radiating down to the lower legs and it worsened to the point where she felt weak and fainted onto the ground. Patient was helped up by her friend.  Patient denies hitting her head or incurring any other injuries. Patient states she never lost consciousness. Endorses occasional dizziness. Denies CP, palpitations. Patient is unsure if it was a "vasovagal" episode which has happened in the past. Patient check her pressure at home noted to be 80s/40s and decided to come to the ED. Patient does not recall incurring any injury to the back.  She endorses being able to complete food shopping with a friend earlier in the day: states pain was bothersome. Patient has been compliant with medications Oxycodone 3-4 times a day, Neurontin, and Lyrica as prescribed. Patient state that she feels a pain from lower left back extending down to the toes, and lower right back extending to the knee. She has chronic neuropathy. Denies new weakness of the legs. Patient states she was recently treated for UTI and endorses dysuria. Denies fevers, chills, sweats. Cough is present with yellow phlegm Cough would be extensive in the AM and make her feel she needs to vomit. Denies nausea.    Concern for cardiac etiology vs vasovagal. Patient without any neurological deficits at this time. CT head was obtained and showed no CT evidence of acute intracranial hemorrhage, brain edema, or acute territorial infarct. Chest X-ray showed clear lungs. Orthostatic vitals were normal.    EKG without acute changes. Trended cardiac enzyme, which was negative. An ECHO was obtained as well and showed an EF of 62% with normal RV and LV systolic functions with no segmental wall motion. She got symptomatic management for cough. Pt remained afebrile, and stable through hospital stay. She was seen by cardiology as well in the hospital. Her blood pressure were held given concern for syncope secondary to anti-hypertensives.    Pt stable and ready for discharge.
Spine appears normal, range of motion is not limited, no muscle or joint tenderness

## 2017-10-31 ENCOUNTER — APPOINTMENT (OUTPATIENT)
Dept: NEUROLOGY | Facility: CLINIC | Age: 62
End: 2017-10-31

## 2017-11-17 ENCOUNTER — APPOINTMENT (OUTPATIENT)
Dept: ORTHOPEDIC SURGERY | Facility: CLINIC | Age: 62
End: 2017-11-17

## 2017-12-01 ENCOUNTER — APPOINTMENT (OUTPATIENT)
Dept: ORTHOPEDIC SURGERY | Facility: CLINIC | Age: 62
End: 2017-12-01

## 2018-03-06 ENCOUNTER — OTHER (OUTPATIENT)
Age: 63
End: 2018-03-06

## 2018-03-06 DIAGNOSIS — K74.60 UNSPECIFIED CIRRHOSIS OF LIVER: ICD-10-CM

## 2018-03-20 ENCOUNTER — APPOINTMENT (OUTPATIENT)
Dept: GASTROENTEROLOGY | Facility: CLINIC | Age: 63
End: 2018-03-20

## 2018-03-26 NOTE — ED ADULT NURSE NOTE - NS ED NOTE  TALK SOMEONE YN
Called patient and informed him that consultation was submitted for Dr. Valles and he will be getting a call within the next few days.   Patient states that he will be seeing Dr. Sanchez tomorrow.    no

## 2018-04-24 NOTE — PROVIDER CONTACT NOTE (OTHER) - RECOMMENDATIONS
insulin?

## 2018-05-16 ENCOUNTER — RESULT REVIEW (OUTPATIENT)
Age: 63
End: 2018-05-16

## 2018-05-18 ENCOUNTER — APPOINTMENT (OUTPATIENT)
Dept: NEUROLOGY | Facility: CLINIC | Age: 63
End: 2018-05-18

## 2018-05-22 ENCOUNTER — RX RENEWAL (OUTPATIENT)
Age: 63
End: 2018-05-22

## 2018-06-19 ENCOUNTER — APPOINTMENT (OUTPATIENT)
Dept: GASTROENTEROLOGY | Facility: CLINIC | Age: 63
End: 2018-06-19

## 2018-07-24 ENCOUNTER — OUTPATIENT (OUTPATIENT)
Dept: OUTPATIENT SERVICES | Facility: HOSPITAL | Age: 63
LOS: 1 days | Discharge: HOME | End: 2018-07-24

## 2018-07-24 DIAGNOSIS — D64.9 ANEMIA, UNSPECIFIED: ICD-10-CM

## 2018-07-24 PROBLEM — D69.6 THROMBOCYTOPENIA, UNSPECIFIED: Chronic | Status: ACTIVE | Noted: 2017-03-21

## 2018-07-24 PROBLEM — R55 SYNCOPE AND COLLAPSE: Chronic | Status: ACTIVE | Noted: 2017-03-21

## 2018-07-24 PROBLEM — F41.9 ANXIETY DISORDER, UNSPECIFIED: Chronic | Status: ACTIVE | Noted: 2017-03-21

## 2018-07-24 PROBLEM — G45.9 TRANSIENT CEREBRAL ISCHEMIC ATTACK, UNSPECIFIED: Chronic | Status: ACTIVE | Noted: 2017-03-21

## 2018-08-09 ENCOUNTER — APPOINTMENT (OUTPATIENT)
Dept: HEMATOLOGY ONCOLOGY | Facility: CLINIC | Age: 63
End: 2018-08-09

## 2018-08-21 ENCOUNTER — APPOINTMENT (OUTPATIENT)
Dept: ENDOCRINOLOGY | Facility: CLINIC | Age: 63
End: 2018-08-21

## 2018-08-28 ENCOUNTER — APPOINTMENT (OUTPATIENT)
Dept: GASTROENTEROLOGY | Facility: CLINIC | Age: 63
End: 2018-08-28

## 2018-11-26 NOTE — PHYSICAL THERAPY INITIAL EVALUATION ADULT - HEALTH SCREEN CRITERIA
After Visit Summary   11/26/2018    Shala Morales    MRN: [id number]           Patient Information     Date Of Birth          1976        Visit Information        Provider Department      11/26/2018 9:00 AM Rita Mohr APRN [facility]        Today's Diagnoses     Preop general physical exam    -  1    Screening for HIV (human immunodeficiency virus)        Need for prophylactic vaccination and inoculation against influenza          Care Instructions    Stop aspirin, ibuprofen, aleve, fish oil 7 days before surgery.   Do not take any meds on the morning of procedure.    Before Your Surgery      Call your surgeon if there is any change in your health. This includes signs of a cold or flu (such as a sore throat, runny nose, cough, rash or fever).    Do not smoke, drink alcohol or take over the counter medicine (unless your surgeon or primary care doctor tells you to) for the 24 hours before and after surgery.    If you take prescribed drugs: Follow your doctor s orders about which medicines to take and which to stop until after surgery.    Eating and drinking prior to surgery: follow the instructions from your surgeon    Take a shower or bath the night before surgery. Use the soap your surgeon gave you to gently clean your skin. If you do not have soap from your surgeon, use your regular soap. Do not shave or scrub the surgery site.  Wear clean pajamas and have clean sheets on your bed.     [facility]    If you have any questions regarding to your visit please contact your care team:     Team Pink:   Clinic Hours Telephone Number   Internal Medicine:  Dr. Gwen Mohr, NP 7am-7pm  Monday - Thursday   7am-5pm  Fridays  [phone]  (Appointment scheduling available 24/7)   Urgent Care - [place] [facility] - 11am-9pm Monday-Friday Saturday-Sunday- 9am-5pm   [place] - 5pm-9pm Monday-Friday  Saturday-Sunday- 9am-5pm  [phone] - [place]  [phone] - [place]       What options do I have for a visit other than an office visit? We offer electronic visits (e-visits) and telephone visits, when medically appropriate.  Please check with your medical insurance to see if these types of visits are covered, as you will be responsible for any charges that are not paid by your insurance.      You can use [employer] (secure electronic communication) to access to your chart, send your primary care provider a message, or make an appointment. Ask a team member how to get started.     For a price quote for your services, please call our Consumer Price Line at [phone] or our Imaging Cost estimation line at [phone] (for imaging tests).  Luda BHAKTA CMA            Follow-ups after your visit        Your next 10 appointments already scheduled     Dec 07, 2018  9:20 AM CST   (Arrive by 9:05 AM)   [facility]    [address] [place] [id number] [phone]              Who to contact     If you have questions or need follow up information about today's clinic visit or your schedule please contact [facility] directly at [phone].  Normal or non-critical lab and imaging results will be communicated to you by [employer], letter or phone within 4 business days after the clinic has received the results. If you do not hear from us within 7 days, please contact the clinic through [employer] or phone. If you have a critical or abnormal lab result, we will notify you by phone as soon as possible.  Submit refill requests through [employer] or call your pharmacy and they will forward the refill request to us. Please allow 3 business days for your refill to be completed.          Additional Information About Your Visit        [employer] Information     [employer] gives you secure  yes "access to your electronic health record. If you see a primary care provider, you can also send messages to your care team and make appointments. If you have questions, please call your primary care clinic.  If you do not have a primary care provider, please call [phone] and they will assist you.        Care EveryWhere ID     This is your Care EveryWhere ID. This could be used by other organizations to access your [place] medical records  [id number]        Your Vitals Were     Pulse Temperature Respirations Height Last Period Pulse Oximetry    78 98.9  F (37.2  C) (Oral) 18 5' 8.5\" (1.74 m) 11/22/2018 100%    BMI (Body Mass Index)                   23.52 kg/m2            Blood Pressure from Last 3 Encounters:   11/26/18 120/68   04/17/18 122/78   12/12/12 115/70    Weight from Last 3 Encounters:   11/26/18 157 lb (71.2 kg)   04/17/18 157 lb 8 oz (71.4 kg)   12/12/12 165 lb (74.8 kg)              Today, you had the following     No orders found for display       Primary Care Provider Fax #    Physician No Ref-Primary [phone]       No address on file        Equal Access to Services     TIANNA STRINGER : Hadii francisco Emery, waaxda luqadaha, qaybta kaalmada adeegyada, kian gold . So [facility] [phone].    ATENCIÓN: Si habla español, tiene a elmore disposición servicios gratuitos de asistencia lingüística. Llame al [phone].    We comply with applicable federal civil rights laws and [place] laws. We do not discriminate on the basis of race, color, national origin, age, disability, sex, sexual orientation, or gender identity.            Thank you!     Thank you for choosing [facility]  for your care. Our goal is always to provide you with excellent care. Hearing back from our patients is one way we can continue to improve our services. Please take a few minutes to complete the written survey that you may receive in the mail after your visit with us. Thank " you!             Your Updated Medication List - Protect others around you: Learn how to safely use, store and throw away your medicines at www.disposemymeds.org.          This list is accurate as of 11/26/18  9:41 AM.  Always use your most recent med list.                   Brand Name Dispense Instructions for use Diagnosis    MULTI VITAMIN DAILY PO      Take 1 tablet by mouth daily        Vitamin B-12 5000 MCG Lozg      Take 5,000 mcg by mouth once a week

## 2019-03-27 ENCOUNTER — RX RENEWAL (OUTPATIENT)
Age: 64
End: 2019-03-27

## 2019-04-25 NOTE — ED ADULT NURSE NOTE - BP NONINVASIVE DIASTOLIC (MM HG)
Ryanne Mora's goals for this visit include:   Chief Complaint   Patient presents with     Consult     nasal deformity        She requests these members of her care team be copied on today's visit information: Yes    PCP: Hailey Shannon    Referring Provider:  Tal Donato MD  [address] [place] [address] [place] [address]    [facility] 04/04/2019     Do you need any medication refills at today's visit? no    
61

## 2019-08-30 NOTE — DISCHARGE NOTE ADULT - IF YOU ARE A SMOKER, IT IS IMPORTANT FOR YOUR HEALTH TO STOP SMOKING. PLEASE BE AWARE THAT SECOND HAND SMOKE IS ALSO HARMFUL.
How Severe Is Your Skin Lesion?: mild
Has Your Skin Lesion Been Treated?: not been treated
Is This A New Presentation, Or A Follow-Up?: Skin Lesions
Statement Selected

## 2019-10-08 NOTE — ED PROVIDER NOTE - PROGRESS NOTE DETAILS
normal Judy Corrales M.D: pt states that she is too weak and is afraid to go home, requesting admission at this time. awaiting urinalysis.

## 2021-01-13 NOTE — PATIENT PROFILE ADULT. - BLOOD TRANSFUSION, PREVIOUS, PROFILE
Met with pt and family to discuss plans for hospice. Met in room briefly and then in family lounge with  Shaun, son Rafi, daughter Arlene and PRITI Donovan.  Presented hospice services and philosophy. Family in agreement with hospice. Pt to be discharged home on 1/14/21 and has agreed to have [facility] follow with hospice services.  Equipment of hospital bed, OBT, 1/2 rails, liquid oxygen 6-10L/oxymask was ordered and being delivered to the pt address today after 7pm. Request medications be filled at the discharge pharmacy and sent home with the pt. Pt and family verbalized understanding that completion of the admission visit is scheduled for 1/14/21 at 1pm in pt's home.  The POLST documentation was completed and left on chart for signature. The Hospice consents were signed on 1/13/21,  defers to Arlene dtr to sign paperwork. Pt/Family has the 24 hour phone number for [facility] for any questions or concerns. Care coordinated with Dottie FAULKNER for hospice services.     Request hospice comfort medications be ordered and sent with pt.     Discharging MD please sign POLST on pt's chart and  order the following comfort medications per Hospice, have them filled at the discharge pharmacy and send with patient upon discharge.    1) Pain or Shortness Of Breath (Dyspnea) choose  appropriate for pt need.    2) Nausea or Agitation choose either liquid or tabs  Haloperidol Liquid 2mg/mL  Take 0.25-0.5 mL (0.5-1 mg) po/sl Q6h prn nausea/agitation                                                                             Haloperidol Tabs 0.5 mg Take 1-2 (0.5-1mg) tabs po/sl Q6h prn nausea/agitation    3) Anxiety or Restlessness choose either liquid or tabs  Lorazepam 2mg/mL   Take 0.125-0.25 mL (0.25-0.5 mg) po/sl Q4h prn anx/restlessness                                                                              Lorazepam Tabs 0.5 mg Take  -1 tab (0.25-0.5mg) po/sl Q4h prn  anx/restlessness    4) Constipation include both  Bisacodyl Suppository 10 mg   Insert 1 suppository NV QD prn constipation                                                                             Senna Tab 8.6 mg 1-2 tabs PO daily prn constipation                                               5) Secretions       Atropine sulfate 1% solution Take 1 - 2 drops po/sl/buc Q4h prn secretions                                                                                                            6)   Fever                 Acetaminophen Suppository 650 mg Insert one suppository per rectum Q4h prn fever         7)  Discontinue non-essential meds if appropriate or pt unable to swallow.       Thank you for this referral and opportunity to work with this family and the medical team.      Hilary Hendrix RN PHN [facility]   Hospice Referral Specialist  [facility]    [phone]  [email or web address]   no

## 2021-05-24 NOTE — ED ADULT TRIAGE NOTE - LOCATION:
PATIENT OUT OF BED FROM THE FOOT. TREMORS INCREASED. PATIENT IS NOT CONFUSED AT
THIS TIME. HAS A "CHEW" OF TOBACCO OBTAINED FROM [employer]ONG[facility]. ASSISTED BACK TO
BED. iNSTRUCTED TO CALL NURSE FOR ASSISTANCE. Left arm;

## 2022-08-26 NOTE — PATIENT PROFILE ADULT. - TEACHING/LEARNING CULTURAL CONSIDERATIONS
General Call    Contacts       Type Contact Phone/Fax    08/26/2022 09:30 AM CDT Phone (Incoming) STEVEN EDGAR (Emergency Contact) [phone]        Reason for Call:  Patient's daughter, Steven, called stating [employer] is requesting additional information for medicare coverage for the hospital bed order for patient.  Patient saw provider AKI Sanford on 8/2/22 and received an incomplete order for coverage.  Steven is requesting a call back.  Please advise.    What are your questions or concerns:  See above    Date of last appointment with provider: see above    Could we send this information to you in [employer] or would you prefer to receive a phone call?:   Patient would prefer a phone call   Okay to leave a detailed message?: No at Cell number on file:    Telephone Information:   Mobile [phone]        none

## 2022-10-03 NOTE — PROVIDER CONTACT NOTE (OTHER) - DATE AND TIME:
Pt is feeling light headed & his heart rate this morning was 140. He is exhausted. He woke up twice last night with his arms tingling.
Reports \"not feeling right\" x 4-5 days. He feels exhausted even with doing light work, light exercise or having a conversation. He states that last night he noticed numbness and tingling to both arms. This AM he went to work and had heart palpitations, lightheadedness, and leg weakness. On his watch, his HR was in the 140's consistently x 1.5min    Hx of anxiety, no cardiac hx. Denies chest pain. Discussed with Dr. Terry Pretty - agrees to refer to ER for further evaluation.     Pt agrees and his mom will drive him to [facility].
31-Jul-2017 11:13

## 2022-11-29 NOTE — ED ADULT NURSE NOTE - TEMPLATE LIST FOR HEAD TO TOE ASSESSMENT
Detail Level: Zone
Plan: Pt to start aldara tx. Already has rx. Pt just came in to ensure she could start Aldara
General

## 2022-12-12 NOTE — ED PROVIDER NOTE - NS ED MD DISPO DIVISION
"Spoke to Sandra at [facility].  She is unable to reach the nurse Ju or supervisor Fela to go over instructions for surgery tomorrow.  She will tell them to  call me back.  Pre-op instructions were faxed to [phone].      Called [facility] again, spoke to Ju patients nurse and she stated patient has been taking plavix and aspirin.  Will inform Dr. Antonio office.        Spoke to Dr. Antonio and he stated " he can just hold plavix today and tomorrow"   Called and informed Ju patients nurse at [facility].  Faxed instructions to her and told her to call back if she doesn't receive them.  Informed her of 8:30 AM arrival time also. She verbalized understanding.  " No [facility]

## 2023-06-01 NOTE — DISCHARGE NOTE ADULT - NS AS DC AMI YN
8922)  Care Plan - Patient's Chronic Conditions and Co-Morbidity Symptoms are Monitored and Maintained or Improved:   Collaborate with multidisciplinary team to address chronic and comorbid conditions and prevent exacerbation or deterioration   Monitor and assess patient's chronic conditions and comorbid symptoms for stability, deterioration, or improvement  Note: Patient verbalizes understanding to verbal information given on hydration and 5 Fu,action and possible side effects. Aware to call MD if develop complications. no

## 2023-06-10 NOTE — ED ADULT NURSE NOTE - TEMPLATE
Respiratory Eye Protection Verbiage: Before proceeding with the stage, a plastic scleral shield was inserted. The globe was anesthetized with a few drops of 1% lidocaine with 1:100,000 epinephrine. Then, an appropriate sized scleral shield was chosen and coated with lacrilube ointment. The shield was gently inserted and left in place for the duration of each stage. After the stage was completed, the shield was gently removed.

## 2023-06-12 NOTE — ED PROVIDER NOTE - NS ED MD DISPO DIVISION
Information to Prepare you for your Surgery    PRE-ADMIT TESTING -  [phone]    [address] [facility]          Your surgery has been scheduled at [facility]. We are pleased to have the opportunity to serve you. For Further Information please call [phone].    On the day of surgery please report to the Information Desk on the 1st floor.    CONTACT YOUR PHYSICIAN'S OFFICE THE DAY PRIOR TO YOUR SURGERY TO OBTAIN YOUR ARRIVAL TIME.     The evening before surgery do not eat anything after 9 p.m. ( this includes hard candy, chewing gum and mints).  You may only have GATORADE, POWERADE AND WATER  from 9 p.m. until you leave your home.   DO NOT DRINK ANY LIQUIDS ON THE WAY TO THE HOSPITAL.      Why does your anesthesiologist allow you to drink Gatorade/Powerade before surgery?  Gatorade/Powerade helps to increase your comfort before surgery and to decrease your nausea after surgery. The carbohydrates in Gatorade/Powerade help reduce your body's stress response to surgery.  If you are a diabetic-drink only water prior to surgery.       Patients may have 2 visitors pre and post procedure. Only 2 visitors will be allowed in the Surgical building with the patient. No one under the age of 12 will be allowed into the facility.    SPECIAL MEDICATION INSTRUCTIONS: TAKE medications checked off by the Anesthesiologist on your Medication List.    Angiogram Patients: Take medications as instructed by your physician, including aspirin.     Surgery Patients:    If you take ASPIRIN - Your PHYSICIAN/SURGEON will need to inform you IF/OR when you need to stop taking aspirin prior to your surgery.     The week prior to surgery do not ot take any medications containing IBUPROFEN or NSAIDS ( Advil, Motrin, Goodys, BC, Aleve, Naproxen etc) If you are not sure if you should take a medicine please call your surgeon's office.  Ok to take Tylenol    Do Not Wear any make-up  (especially eye make-up) to surgery. Please remove any false eyelashes or eyelash extensions. If you arrive the day of surgery with makeup/eyelashes on you will be required to remove prior to surgery. (There is a risk of corneal abrasions if eye makeup/eyelash extensions are not removed)      Leave all valuables at home.   Do Not wear any jewelry or watches, including any metal in body piercings. Jewelry must be removed prior to coming to the hospital.  There is a possibility that rings that are unable to be removed may be cut off if they are on the surgical extremity.    Please remove all hair extensions, wigs, clips and any other metal accessories/ ornaments from your hair.  These items may pose a flammable/fire risk in Surgery and must be removed.    Do not shave your surgical area at least 5 days prior to your surgery. The surgical prep will be performed at the hospital according to Infection Control regulations.    Contact Lens must be removed before surgery. Either do not wear the contact lens or bring a case and solution for storage.  Please bring a container for eyeglasses or dentures as required.  Bring any paperwork your physician has provided, such as consent forms,  history and physicals, doctor's orders, etc.   Bring comfortable clothes that are loose fitting to wear upon discharge. Take into consideration the type of surgery being performed.  Maintain your diet as advised per your physician the day prior to surgery.      Adequate rest the night before surgery is advised.   Park in the Parking lot behind the hospital or in the [facility] across the street from the parking lot. Parking is complimentary.  If you will be discharged the same day as your procedure, please arrange for a responsible adult to drive you home or to accompany you if traveling by taxi.   YOU WILL NOT BE PERMITTED TO DRIVE OR TO LEAVE THE HOSPITAL ALONE AFTER SURGERY.   If you are being discharged the same day, it is  strongly recommended that you arrange for someone to remain with you for the first 24 hrs following your surgery.    The Surgeon will speak to your family/visitor after your surgery regarding the outcome of your surgery and post op care.  The Surgeon may speak to you after your surgery, but there is a possibility you may not remember the details.  Please check with your family members regarding the conversation with the Surgeon.    We strongly recommend whoever is bringing you home be present for discharge instructions.  This will ensure a thorough understanding for your post op home care.    ALL CHILDREN MUST ALWAYS BE ACCOMPANIED BY AN ADULT.    Visitors-Refer to current Visitor policy handouts.    Thank you for your cooperation.  The Staff of [facility].            Bathing Instructions with Hibiclens    Shower the evening before and morning of your procedure with Chlorhexidine (Hibiclens)  do not use Chlorhexidine on your face or genitals. Do not get in your eyes.  Wash your face with water and your regular face wash/soap  Use your regular shampoo  Apply Chlorhexidine (Hibiclens) directly on your skin or on a wet washcloth and wash gently. When showering: Move away from the shower stream when applying Chlorhexidine (Hibiclens) to avoid rinsing off too soon.  Rinse thoroughly with warm water  Do not dilute Chlorhexidine (Hibiclens)   Dry off as usual, do not use any deodorant, powder, body lotions, perfume, after shave or cologne.                  [facility]

## 2023-10-27 NOTE — PATIENT PROFILE ADULT. - NS TRANSFER PATIENT BELONGINGS
Jewelry/Money (specify)/, ring/Electronic Device (specify)/Clothing/Cell Phone/PDA (specify)
back pain general

## 2024-02-07 NOTE — ED ADULT NURSE NOTE - CINV DISCH TEACH PARTICIP
Instructions (Optional): Will monitor Introduction Text (Please End With A Colon): The following procedure was deferred: Detail Level: Detailed Reason To Defer Override: no clinical evidence X Size Of Lesion In Cm (Optional): 0 Family/Patient

## 2024-06-03 NOTE — H&P ADULT. - CLICK TO LAUNCH ORM
. [Home] : at home, [unfilled] , at the time of the visit. [Medical Office: ([facility])___] : at the medical office located in  [Patient] : the patient [Follow-up] : a follow-up of an existing diagnosis

## 2024-06-14 NOTE — ED ADULT NURSE NOTE - TEMPLATE LIST FOR HEAD TO TOE ASSESSMENT
Home Care is calling regarding an established patient with [facility].    Requesting orders from: Walter Pulido  Provider is following patient: Yes  Is this a 60-day recertification request?  No    Verbal Orders Requested    Skilled Nursing  Request for initial certification (first set of orders)   Frequency:  1x/wk for 6 wks  2 PRN visits       Confirmed ok to leave a detailed message with call back.  Contact information confirmed and updated as needed.    Puja Ellsworth RN    Abdominal Pain, N/V/D

## 2024-08-09 NOTE — ED ADULT NURSE NOTE - CAS TRG GENERAL NORM CIRC DET
Subjective   Patient ID: Kirstin Howe is a 84 y.o. female who presents for Chronic Kidney Disease (SGLT2 discussion)    Referring Provider: Carolina MONTAÑO  HPI: CKD stage 4/a2. last EGFR 27 sCr 1.85  Now 1 month on farxiga 5mg tolerating well. No issues with painful/difficult urination. No issues with dizziness/lightheadedness. Agreeable to increase to target dose of 10mg qd  HTN:   /87 at last ov  Medications  Lisinopril 40mg every day  Amlodipine 5mg every day    Glucose controlled and monitored  A1C 5.7    HLD    On statin? No    Medications reviewed for appropriate dosing in setting of CKD  Recommended changes:  -No adjustments necessary at this time.        Objective     There were no vitals taken for this visit.     Labs  Lab Results   Component Value Date    BILITOT 0.3 06/01/2024    CALCIUM 9.2 06/01/2024    CO2 24 06/01/2024     (H) 06/01/2024    CREATININE 1.89 (H) 06/01/2024    GLUCOSE 98 06/01/2024    ALKPHOS 74 06/01/2024    K 4.5 06/01/2024    PROT 6.5 06/01/2024     06/01/2024    AST 12 06/01/2024    ALT <3 (L) 06/01/2024    BUN 21 06/01/2024    ANIONGAP 14 06/01/2024    MG 2.02 05/27/2021    PHOS 3.1 10/09/2023    ALBUMIN 3.7 06/01/2024    GFRF 29 (A) 09/13/2023     Lab Results   Component Value Date    TRIG 66 01/30/2024    CHOL 178 01/30/2024    LDLCALC 105 (H) 01/30/2024    HDL 59.5 01/30/2024     Lab Results   Component Value Date    HGBA1C 5.7 (A) 01/06/2022       Current Outpatient Medications on File Prior to Visit   Medication Sig Dispense Refill    amLODIPine (Norvasc) 5 mg tablet Take 1 tablet (5 mg) by mouth once daily.      buPROPion XL (Wellbutrin XL) 300 mg 24 hr tablet Take 1 tablet (300 mg) by mouth once daily. 90 tablet 3    Farxiga 5 mg Take 1 tablet (5 mg) by mouth once daily. TAKE ONE TABLET BY MOUTH ONCE DAILY IN THE MORNING 30 tablet 0    lisinopril 40 mg tablet Take 1 tablet (40 mg) by mouth once daily.       No current facility-administered  medications on file prior to visit.        Assessment/Plan   PATIENT EDUCATION/DISCUSSION:  - Counseled patient on MOA, expectations, side effects, duration of therapy, contraindications, administration, and monitoring parameters  - Answered all patient questions and concerns  - deductible met farxiga  $0  _______________________________________________________________________  PLAN  1. INCREASE farxiga to 10mg 90 days sent to [facility]. Follow up 9/13@3p    Teodoro Mo PharmD BCPS    Continue all meds under the continuation of care with the referring provider and clinical pharmacy team.                Strong peripheral pulses

## 2024-10-04 NOTE — ED PROVIDER NOTE - CADM POA CENTRAL LINE
her Crohn's.    GI following and can see prn over the weekend,  If d/c will fu in the outpt setting    Thanks for allowing me to participate in the care of this patient.  Signed By: Yeni Maloney PA-C     10/4/2024  2:17 PM          No
